# Patient Record
Sex: FEMALE | Race: BLACK OR AFRICAN AMERICAN | Employment: OTHER | URBAN - METROPOLITAN AREA
[De-identification: names, ages, dates, MRNs, and addresses within clinical notes are randomized per-mention and may not be internally consistent; named-entity substitution may affect disease eponyms.]

---

## 2017-01-04 ENCOUNTER — TELEPHONE (OUTPATIENT)
Dept: FAMILY MEDICINE CLINIC | Facility: CLINIC | Age: 67
End: 2017-01-04

## 2017-01-04 ENCOUNTER — OFFICE VISIT (OUTPATIENT)
Dept: FAMILY MEDICINE CLINIC | Facility: CLINIC | Age: 67
End: 2017-01-04

## 2017-01-04 VITALS
HEART RATE: 89 BPM | WEIGHT: 233 LBS | SYSTOLIC BLOOD PRESSURE: 143 MMHG | BODY MASS INDEX: 41 KG/M2 | TEMPERATURE: 98 F | RESPIRATION RATE: 18 BRPM | DIASTOLIC BLOOD PRESSURE: 81 MMHG

## 2017-01-04 DIAGNOSIS — E11.65 TYPE 2 DIABETES MELLITUS WITH HYPERGLYCEMIA, WITHOUT LONG-TERM CURRENT USE OF INSULIN (HCC): Primary | ICD-10-CM

## 2017-01-04 DIAGNOSIS — E78.5 HYPERLIPIDEMIA, UNSPECIFIED HYPERLIPIDEMIA TYPE: ICD-10-CM

## 2017-01-04 PROCEDURE — 99213 OFFICE O/P EST LOW 20 MIN: CPT | Performed by: FAMILY MEDICINE

## 2017-01-04 PROCEDURE — G0463 HOSPITAL OUTPT CLINIC VISIT: HCPCS | Performed by: FAMILY MEDICINE

## 2017-01-04 RX ORDER — BLOOD-GLUCOSE METER
1 KIT MISCELLANEOUS AS NEEDED
Qty: 1 KIT | Refills: 0 | Status: SHIPPED | OUTPATIENT
Start: 2017-01-04 | End: 2017-01-04

## 2017-01-04 RX ORDER — FLUTICASONE PROPIONATE AND SALMETEROL 250; 50 UG/1; UG/1
1 POWDER RESPIRATORY (INHALATION) EVERY 12 HOURS SCHEDULED
Qty: 3 PACKAGE | Refills: 2 | Status: SHIPPED | OUTPATIENT
Start: 2017-01-04 | End: 2017-04-05

## 2017-01-04 RX ORDER — LANCETS
EACH MISCELLANEOUS
Qty: 100 EACH | Refills: 11 | Status: SHIPPED | OUTPATIENT
Start: 2017-01-04 | End: 2020-07-30

## 2017-01-04 NOTE — TELEPHONE ENCOUNTER
Pt was prescribed FREESTYLE SYSTEM Does not apply Kit. Pharmacy stts that this does not exist anymore. Could dr please send script for One Touch Ultra machine? They will also need a separate script for the test strips and lancets.  Thank you

## 2017-01-15 NOTE — PROGRESS NOTES
HPI:  Thaddeus Opitz is a 78year old female who presents for lab results review. Pt recently had NP form insurance company come to house for evaluation. She was told her diabetes was uncontrolled.   Pt is currently not on medication and does not have interest i Rfl:    Multiple Vitamins-Minerals (CENTRUM SILVER) Oral Tab Take  by mouth. Take one tablet by mouth daily Disp:  Rfl:      No current facility-administered medications on file prior to visit.    Tobacco Use: no     ROS: see above    Objective:   Gen: AOx3

## 2017-02-17 RX ORDER — OMEPRAZOLE 20 MG/1
20 CAPSULE, DELAYED RELEASE ORAL
Qty: 90 CAPSULE | Refills: 1 | OUTPATIENT
Start: 2017-02-17

## 2017-02-17 NOTE — TELEPHONE ENCOUNTER
Diabetes Medications PROTOCOL FAILED.   Protocol Criteria:  · Appointment scheduled in the past 6 months or the next 3 months  · A1C < 7.5 in the past 6 months  · Creatinine in the past 12 months  · Creatinine result < 1.5   Recent Visits       Provider Dep

## 2017-02-17 NOTE — TELEPHONE ENCOUNTER
From: Santo Whyte  To:  Chaitanya Martinez MD  Sent: 2/15/2017 8:05 AM CST  Subject: Medication Renewal Request    Original authorizing provider: MD Santo Eugene would like a refill of the following medications:  omeprazole 20 MG

## 2017-02-20 ENCOUNTER — TELEPHONE (OUTPATIENT)
Dept: FAMILY MEDICINE CLINIC | Facility: CLINIC | Age: 67
End: 2017-02-20

## 2017-02-27 ENCOUNTER — TELEPHONE (OUTPATIENT)
Dept: FAMILY MEDICINE CLINIC | Facility: CLINIC | Age: 67
End: 2017-02-27

## 2017-02-27 NOTE — TELEPHONE ENCOUNTER
[facility] is requesting a new Rx for Contour meter, strips and lancets, insurance now pays for [employer] products per [facility].

## 2017-02-27 NOTE — TELEPHONE ENCOUNTER
Reviewed request with pharmacist, pharmacist states pt has not yet picked up monitor and supplies sent on 1/4/17. New prescriptions sent per protocol.

## 2017-03-17 ENCOUNTER — TELEPHONE (OUTPATIENT)
Dept: FAMILY MEDICINE CLINIC | Facility: CLINIC | Age: 67
End: 2017-03-17

## 2017-03-17 DIAGNOSIS — Z12.11 SCREENING FOR COLON CANCER: Primary | ICD-10-CM

## 2017-03-17 NOTE — TELEPHONE ENCOUNTER
Pt calling states she is unable to go to [facility] due to insurance, she will not be covered there, please disregard message below. Pt states she would like referral to GI dr here in [place]. Please call when referral for colonoscopy approved.

## 2017-03-17 NOTE — TELEPHONE ENCOUNTER
Patient has been scheduled for a colonoscopy at [facility] on Monday March 20. Office called to advise we must have a referral  Dr Shirley Zuñiga - Gastroenterologist  Diagnosis Code:  Z12.11  CPT: 84400    Fax to [phone] once approved.

## 2017-03-18 NOTE — TELEPHONE ENCOUNTER
Dr Radha Woodard, please sign pending referral for Dr Shelia Demarco pt (reordered from external referral to internal). Thank you.

## 2017-04-01 RX ORDER — OMEPRAZOLE 20 MG/1
20 CAPSULE, DELAYED RELEASE ORAL
Qty: 90 CAPSULE | Refills: 0 | Status: SHIPPED | OUTPATIENT
Start: 2017-04-01 | End: 2017-07-06

## 2017-04-01 NOTE — TELEPHONE ENCOUNTER
From: Christen Israel  To:  Joan Hansen MD  Sent: 3/21/2017 5:47 PM CDT  Subject: Medication Renewal Request    Original authorizing provider: MD Christen Hannah would like a refill of the following medications:  omeprazole 20 MG

## 2017-04-01 NOTE — TELEPHONE ENCOUNTER
Refill Protocol Appointment Criteria  · Appointment scheduled in the past 12 months or in the next 3 months  Recent Visits       Provider Department Primary Dx    2 months ago Polo Arroyo, [address] [facility], [place] [address], [facility] Type 2 diabetes kaela

## 2017-04-05 ENCOUNTER — OFFICE VISIT (OUTPATIENT)
Dept: FAMILY MEDICINE CLINIC | Facility: CLINIC | Age: 67
End: 2017-04-05

## 2017-04-05 VITALS
HEART RATE: 96 BPM | SYSTOLIC BLOOD PRESSURE: 137 MMHG | DIASTOLIC BLOOD PRESSURE: 74 MMHG | BODY MASS INDEX: 36.86 KG/M2 | HEIGHT: 63 IN | RESPIRATION RATE: 18 BRPM | WEIGHT: 208 LBS | TEMPERATURE: 98 F

## 2017-04-05 DIAGNOSIS — E11.65 TYPE 2 DIABETES MELLITUS WITH HYPERGLYCEMIA, WITHOUT LONG-TERM CURRENT USE OF INSULIN (HCC): ICD-10-CM

## 2017-04-05 DIAGNOSIS — J44.9 ASTHMA WITH COPD (CHRONIC OBSTRUCTIVE PULMONARY DISEASE) (HCC): Chronic | ICD-10-CM

## 2017-04-05 DIAGNOSIS — Z12.31 ENCOUNTER FOR SCREENING MAMMOGRAM FOR HIGH-RISK PATIENT: Primary | ICD-10-CM

## 2017-04-05 DIAGNOSIS — Z00.00 ROUTINE PHYSICAL EXAMINATION: ICD-10-CM

## 2017-04-05 DIAGNOSIS — G47.33 OBSTRUCTIVE SLEEP APNEA: ICD-10-CM

## 2017-04-05 DIAGNOSIS — R05.9 COUGH: ICD-10-CM

## 2017-04-05 DIAGNOSIS — D12.6 BENIGN NEOPLASM OF COLON, UNSPECIFIED PART OF COLON: ICD-10-CM

## 2017-04-05 DIAGNOSIS — M81.0 OSTEOPOROSIS WITHOUT CURRENT PATHOLOGICAL FRACTURE, UNSPECIFIED OSTEOPOROSIS TYPE: ICD-10-CM

## 2017-04-05 DIAGNOSIS — E78.5 HYPERLIPIDEMIA, UNSPECIFIED HYPERLIPIDEMIA TYPE: ICD-10-CM

## 2017-04-05 DIAGNOSIS — E66.01 SEVERE OBESITY (BMI 35.0-39.9) WITH COMORBIDITY (HCC): Chronic | ICD-10-CM

## 2017-04-05 PROBLEM — J44.89 ASTHMA WITH COPD (CHRONIC OBSTRUCTIVE PULMONARY DISEASE) (HCC): Chronic | Status: ACTIVE | Noted: 2017-04-05

## 2017-04-05 PROBLEM — J44.89 ASTHMA WITH COPD (CHRONIC OBSTRUCTIVE PULMONARY DISEASE): Chronic | Status: ACTIVE | Noted: 2017-04-05

## 2017-04-05 PROCEDURE — G0439 PPPS, SUBSEQ VISIT: HCPCS | Performed by: FAMILY MEDICINE

## 2017-04-05 PROCEDURE — 96160 PT-FOCUSED HLTH RISK ASSMT: CPT | Performed by: FAMILY MEDICINE

## 2017-04-05 RX ORDER — MONTELUKAST SODIUM 10 MG/1
TABLET ORAL
Qty: 90 TABLET | Refills: 1 | Status: SHIPPED | OUTPATIENT
Start: 2017-04-05 | End: 2017-07-31

## 2017-04-05 NOTE — PROGRESS NOTES
HPI:   Chris Murillo is a 78year old female who presents for a Medicare Annual Wellness visit.     Pt states she was told she had asthma in the past and then saw pulmonologist who told her she had bronchiolitis and should stop Advair and just take Alb been poor?: No    Type of Diet: Balanced    How does the patient maintain a good energy level?: Daily Walks    How would you describe your daily physical activity?: Moderate    How would you describe your current health state?: Fair    How do you maintain Medicare Assessment Questionnaire completed by patient and sent to HIM for scanning? Yes    Please go to \"Cognitive Assessment\" under Medicare Assessment section in Charting, test patient and document.     Then, refresh your progress note to see your input ULTRASOFT LANCETS Does not apply Misc Test by Intradermal route  every day Disp: 100 each Rfl: 11   Blood Glucose Monitoring Suppl ([address]) W/DEVICE Does not apply Kit To use daily Disp: 1 kit Rfl: 0   Glucose Blood (ONETOUCH ULTRA BLUE) In vaginal discharge or itching, no complaint of urinary incontinence   MUSCULOSKELETAL: denies back pain  NEURO: denies headaches  PSYCHE: denies depression or anxiety  HEMATOLOGIC: denies hx of anemia  ENDOCRINE: denies thyroid history  ALL/ASTHMA: denies h healthy eating and increased physical activity. Encounter for screening mammogram for high-risk patient  (primary encounter diagnosis)    Mammogram ordered. Will call with results.    Type 2 diabetes mellitus with hyperglycemia, without long-term current found for this or any previous visit. No flowsheet data found. Fecal Occult Blood Annually No results found for: FOB No flowsheet data found.     Glaucoma Screening      Ophthalmology Visit Annually yes    Bone Density Screening      Dexascan Every two (mg/dL)   Date Value   01/22/2014 21    No flowsheet data found. Drug Serum Conc  Annually No results found for: DIGOXIN, DIG, VALP No flowsheet data found.     Diabetes      HgbA1C  Annually HEMOGLOBIN A1C (% of total Hgb)   Date Value   01/22/2014 6.5 History  Administered            Date(s) Administered    HIGH DOSE FLU 65 YRS AND OLDER PRSV FREE SINGLE D (20656) FLU CLINIC                          10/26/2016      Pneumococcal (Prevnar 13)                          10/26/2016      Pneumococcal Vaccine,

## 2017-05-01 ENCOUNTER — HOSPITAL ENCOUNTER (OUTPATIENT)
Dept: BONE DENSITY | Facility: HOSPITAL | Age: 67
Discharge: HOME | End: 2017-05-01
Attending: FAMILY MEDICINE
Payer: MEDICARE

## 2017-05-01 ENCOUNTER — HOSPITAL ENCOUNTER (OUTPATIENT)
Dept: RESPIRATORY THERAPY | Facility: HOSPITAL | Age: 67
Discharge: HOME | End: 2017-05-01
Attending: FAMILY MEDICINE
Payer: MEDICARE

## 2017-05-01 DIAGNOSIS — J44.9 COPD (CHRONIC OBSTRUCTIVE PULMONARY DISEASE) (HCC): ICD-10-CM

## 2017-05-01 DIAGNOSIS — M81.0 OSTEOPOROSIS WITHOUT CURRENT PATHOLOGICAL FRACTURE, UNSPECIFIED OSTEOPOROSIS TYPE: ICD-10-CM

## 2017-05-01 DIAGNOSIS — J44.9 ASTHMA WITH COPD (CHRONIC OBSTRUCTIVE PULMONARY DISEASE) (HCC): Chronic | ICD-10-CM

## 2017-05-01 DIAGNOSIS — J45.909 ASTHMA: ICD-10-CM

## 2017-05-01 PROCEDURE — 94729 DIFFUSING CAPACITY: CPT | Performed by: INTERNAL MEDICINE

## 2017-05-01 PROCEDURE — 94726 PLETHYSMOGRAPHY LUNG VOLUMES: CPT | Performed by: INTERNAL MEDICINE

## 2017-05-01 PROCEDURE — 77080 DXA BONE DENSITY AXIAL: CPT

## 2017-05-01 PROCEDURE — 94060 EVALUATION OF WHEEZING: CPT | Performed by: INTERNAL MEDICINE

## 2017-05-02 NOTE — PROCEDURES
Pulmonary Function Test     Roni Citizen Patient Status:  Outpatient    1950 MRN [id number]   Date of Exam 2017 PCP Lara Storey MD           Spirometry   FEV1:  1.76 L /   76 %      FEV1/FVC:  73 %   No significant BDR     Lung Volume

## 2017-05-22 ENCOUNTER — APPOINTMENT (OUTPATIENT)
Dept: LAB | Age: 67
End: 2017-05-22
Attending: FAMILY MEDICINE
Payer: MEDICARE

## 2017-05-22 ENCOUNTER — OFFICE VISIT (OUTPATIENT)
Dept: FAMILY MEDICINE CLINIC | Facility: CLINIC | Age: 67
End: 2017-05-22

## 2017-05-22 VITALS
BODY MASS INDEX: 35 KG/M2 | RESPIRATION RATE: 20 BRPM | HEART RATE: 78 BPM | DIASTOLIC BLOOD PRESSURE: 79 MMHG | OXYGEN SATURATION: 93 % | TEMPERATURE: 98 F | SYSTOLIC BLOOD PRESSURE: 135 MMHG | WEIGHT: 196 LBS

## 2017-05-22 DIAGNOSIS — R06.2 WHEEZING: ICD-10-CM

## 2017-05-22 DIAGNOSIS — E11.65 TYPE 2 DIABETES MELLITUS WITH HYPERGLYCEMIA, WITHOUT LONG-TERM CURRENT USE OF INSULIN (HCC): ICD-10-CM

## 2017-05-22 DIAGNOSIS — E78.5 HYPERLIPIDEMIA, UNSPECIFIED HYPERLIPIDEMIA TYPE: ICD-10-CM

## 2017-05-22 DIAGNOSIS — R05.9 COUGH: Primary | ICD-10-CM

## 2017-05-22 PROCEDURE — 83036 HEMOGLOBIN GLYCOSYLATED A1C: CPT

## 2017-05-22 PROCEDURE — 80061 LIPID PANEL: CPT

## 2017-05-22 PROCEDURE — G0463 HOSPITAL OUTPT CLINIC VISIT: HCPCS | Performed by: FAMILY MEDICINE

## 2017-05-22 PROCEDURE — 99214 OFFICE O/P EST MOD 30 MIN: CPT | Performed by: FAMILY MEDICINE

## 2017-05-22 PROCEDURE — 94640 AIRWAY INHALATION TREATMENT: CPT | Performed by: FAMILY MEDICINE

## 2017-05-22 PROCEDURE — 36415 COLL VENOUS BLD VENIPUNCTURE: CPT

## 2017-05-22 RX ORDER — FLUTICASONE PROPIONATE AND SALMETEROL 250; 50 UG/1; UG/1
1 POWDER RESPIRATORY (INHALATION) EVERY 12 HOURS SCHEDULED
Qty: 1 EACH | Refills: 2 | Status: SHIPPED | OUTPATIENT
Start: 2017-05-22 | End: 2017-08-10 | Stop reason: ALTCHOICE

## 2017-05-22 RX ORDER — ALBUTEROL SULFATE 2.5 MG/3ML
2.5 SOLUTION RESPIRATORY (INHALATION) EVERY 4 HOURS PRN
Qty: 1 BOX | Refills: 3 | Status: SHIPPED | OUTPATIENT
Start: 2017-05-22

## 2017-05-22 RX ORDER — ALBUTEROL SULFATE 90 UG/1
2 AEROSOL, METERED RESPIRATORY (INHALATION) EVERY 4 HOURS PRN
Qty: 1 INHALER | Refills: 3 | Status: SHIPPED | OUTPATIENT
Start: 2017-05-22 | End: 2017-05-25

## 2017-05-22 RX ORDER — ALBUTEROL SULFATE 2.5 MG/3ML
2.5 SOLUTION RESPIRATORY (INHALATION) ONCE
Status: COMPLETED | OUTPATIENT
Start: 2017-05-22 | End: 2017-05-22

## 2017-05-22 RX ORDER — PREDNISONE 20 MG/1
20 TABLET ORAL 2 TIMES DAILY
Qty: 10 TABLET | Refills: 0 | Status: SHIPPED | OUTPATIENT
Start: 2017-05-22 | End: 2017-05-27

## 2017-05-22 RX ADMIN — ALBUTEROL SULFATE 2.5 MG: 2.5 SOLUTION RESPIRATORY (INHALATION) at 11:57:00

## 2017-05-22 NOTE — PROGRESS NOTES
HPI: Kaylen Tracey is a 78year old female who presents for cough and wheezing. Pt reports it started few weeks ago. Grandchild was sick. She had stopped Advair as well as we were unsure if she had asthma. Is waking up coughing and short of breath.    Had PFT (four) hours as needed for Wheezing. inhale 2 puff by inhalation route  every 4 - 6 hours as needed Disp: 1 Inhaler Rfl: 3   Cyanocobalamin (VITAMIN B-12 CR OR) Take  by mouth.  Disp:  Rfl:    Albuterol Sulfate (VENTOLIN) (2.5 MG/3ML) 0.083% Inhalation Nebu

## 2017-05-25 ENCOUNTER — TELEPHONE (OUTPATIENT)
Dept: FAMILY MEDICINE CLINIC | Facility: CLINIC | Age: 67
End: 2017-05-25

## 2017-05-25 DIAGNOSIS — Z12.31 ENCOUNTER FOR SCREENING MAMMOGRAM FOR HIGH-RISK PATIENT: Primary | ICD-10-CM

## 2017-05-25 RX ORDER — ALBUTEROL SULFATE 90 UG/1
2 AEROSOL, METERED RESPIRATORY (INHALATION) EVERY 4 HOURS PRN
Qty: 1 INHALER | Refills: 3 | Status: SHIPPED | OUTPATIENT
Start: 2017-05-25 | End: 2017-08-20

## 2017-05-25 NOTE — TELEPHONE ENCOUNTER
Pharmacy requesting ICD 10 code for Albuterol inhaler. Med pended with ICD 10 code J44.9, OK to send?

## 2017-05-25 NOTE — TELEPHONE ENCOUNTER
Need a new script to include the dx code  ICD 10  COPD J 44.9       Albuterol Sulfate HFA (PROAIR HFA) 108 (90 Base) MCG/ACT Inhalation Aero Soln 1 Inhaler 3 5/22/2017       [id number] - [place], [place] - [address] [phone], [phone]    MAXX

## 2017-05-25 NOTE — TELEPHONE ENCOUNTER
[facility] is calling to request pt screening mammogram order   Pt already had her test done, order is needed in order to release the results   Please fax to [phone]  Attn: [facility]

## 2017-05-26 NOTE — TELEPHONE ENCOUNTER
Faxed order again and did go through with a confirmation. [facility] should have order now.

## 2017-05-26 NOTE — TELEPHONE ENCOUNTER
Attempting to fax but doesn't seem to be going through. Called and [address] to ask if any other fax number to try. Will keep trying the [phone] fax#.

## 2017-05-27 ENCOUNTER — TELEPHONE (OUTPATIENT)
Dept: FAMILY MEDICINE CLINIC | Facility: CLINIC | Age: 67
End: 2017-05-27

## 2017-05-27 RX ORDER — ALBUTEROL SULFATE 90 UG/1
2 AEROSOL, METERED RESPIRATORY (INHALATION) EVERY 4 HOURS PRN
Qty: 1 INHALER | Refills: 3 | Status: CANCELLED | OUTPATIENT
Start: 2017-05-27

## 2017-05-27 NOTE — TELEPHONE ENCOUNTER
Nathen Hernandez- can you look to see what the results are so we can notify her. If they are normal, please call her. I don't want her to have to wait all weekend. Thanks!

## 2017-05-27 NOTE — TELEPHONE ENCOUNTER
From: Gabriele Pereyra  To:  Contreras Barrios MD  Sent: 5/21/2017 10:18 AM CDT  Subject: Medication Renewal Request    Original authorizing provider: Leola Phalen, MD Abram Stabler would like a refill of the following medications:  Albuterol Sulfa

## 2017-05-27 NOTE — TELEPHONE ENCOUNTER
Mammogram report received from [address] [place] [address]; placed on Dr Jama Thompson desk for review.

## 2017-06-15 ENCOUNTER — TELEPHONE (OUTPATIENT)
Dept: GASTROENTEROLOGY | Facility: CLINIC | Age: 67
End: 2017-06-15

## 2017-06-15 ENCOUNTER — OFFICE VISIT (OUTPATIENT)
Dept: GASTROENTEROLOGY | Facility: CLINIC | Age: 67
End: 2017-06-15

## 2017-06-15 ENCOUNTER — APPOINTMENT (OUTPATIENT)
Dept: LAB | Age: 67
End: 2017-06-15
Attending: INTERNAL MEDICINE
Payer: MEDICARE

## 2017-06-15 VITALS
HEART RATE: 76 BPM | BODY MASS INDEX: 34.55 KG/M2 | WEIGHT: 195 LBS | DIASTOLIC BLOOD PRESSURE: 70 MMHG | HEIGHT: 63 IN | SYSTOLIC BLOOD PRESSURE: 121 MMHG

## 2017-06-15 DIAGNOSIS — Z12.11 SCREEN FOR COLON CANCER: Primary | ICD-10-CM

## 2017-06-15 DIAGNOSIS — D12.6 BENIGN NEOPLASM OF COLON, UNSPECIFIED: ICD-10-CM

## 2017-06-15 DIAGNOSIS — D12.6 BENIGN NEOPLASM OF COLON, UNSPECIFIED PART OF COLON: ICD-10-CM

## 2017-06-15 DIAGNOSIS — Z12.11 COLON CANCER SCREENING: Primary | ICD-10-CM

## 2017-06-15 DIAGNOSIS — G47.33 OBSTRUCTIVE SLEEP APNEA: ICD-10-CM

## 2017-06-15 DIAGNOSIS — K21.9 GASTROESOPHAGEAL REFLUX DISEASE WITHOUT ESOPHAGITIS: ICD-10-CM

## 2017-06-15 PROCEDURE — G0463 HOSPITAL OUTPT CLINIC VISIT: HCPCS | Performed by: INTERNAL MEDICINE

## 2017-06-15 PROCEDURE — 36415 COLL VENOUS BLD VENIPUNCTURE: CPT

## 2017-06-15 PROCEDURE — 82607 VITAMIN B-12: CPT

## 2017-06-15 PROCEDURE — 99213 OFFICE O/P EST LOW 20 MIN: CPT | Performed by: INTERNAL MEDICINE

## 2017-06-15 PROCEDURE — 83735 ASSAY OF MAGNESIUM: CPT

## 2017-06-15 RX ORDER — MECLIZINE HYDROCHLORIDE 25 MG/1
12.5 TABLET ORAL
COMMUNITY
End: 2018-02-14

## 2017-06-15 RX ORDER — POLYETHYLENE GLYCOL 3350, SODIUM CHLORIDE, SODIUM BICARBONATE, POTASSIUM CHLORIDE 420; 11.2; 5.72; 1.48 G/4L; G/4L; G/4L; G/4L
POWDER, FOR SOLUTION ORAL
Qty: 1 BOTTLE | Refills: 0 | Status: SHIPPED | OUTPATIENT
Start: 2017-06-15 | End: 2018-02-14

## 2017-06-15 NOTE — TELEPHONE ENCOUNTER
Scheduled for:  Colonoscopy 29[address]  Provider Name: Monet Del Rio  Date:  Bunny Dobson 7/25/17  Location:  [facility]  Sedation:  MAC  Time: 1:30 pm  Prep: split dose trilyte  Meds/Allergies Reconciled?:  PCN  Diagnosis with codes:  Colon cancer screening Z12.11  Was patient infor

## 2017-06-15 NOTE — H&P
[address] [facility] [address] Gastroenterology                                                                                                  Clinic History and Physical     Pa Recon Soln As directed. Disp: 1 Bottle Rfl: 0   Albuterol Sulfate HFA (PROAIR HFA) 108 (90 Base) MCG/ACT Inhalation Aero Soln Inhale 2 puffs into the lungs every 4 (four) hours as needed for Wheezing.  inhale 2 puff by inhalation route  every 4 - 6 hours as ULTRA BLUE) In Vitro Strip place 1 by Misc. (Non-Drug; Combo Route) route 3 times every day with glucose monitor Disp:  Rfl:    Multiple Vitamins-Minerals (CENTRUM SILVER) Oral Tab Take  by mouth.  Take one tablet by mouth daily Disp:  Rfl:    Cobalamine Com colon cancer screening evaluation. NO anemia noted on lab work. GERD well controlled on PPI. Will check b12/mag. Discussed [place] screening but she is not interested in pursuing this.     #DM  #ANTONIO  #Hx of polyps  # Average Risk screening: patient is con

## 2017-06-15 NOTE — PATIENT INSTRUCTIONS
1. Schedule colonoscopy with MAC    2.  bowel prep from pharmacy ([facility])    3. Medication adjustment:       A. Day BEFORE colonoscopy: HOLD metformin     B. Day OF colonoscopy: HOLD metformin     C.  Continue ALL other medications as usual

## 2017-06-16 ENCOUNTER — TELEPHONE (OUTPATIENT)
Dept: GASTROENTEROLOGY | Facility: CLINIC | Age: 67
End: 2017-06-16

## 2017-06-16 DIAGNOSIS — R79.0 LOW MAGNESIUM LEVEL: Primary | ICD-10-CM

## 2017-06-16 NOTE — TELEPHONE ENCOUNTER
Slightly low mag (1.7), d/w patient and she will increase mag rich foods. Okay to continue PPI. Recheck Mag in August 2017. Also B12 high, I asked her to stop daily b12 and take only twice a week.

## 2017-07-07 RX ORDER — OMEPRAZOLE 20 MG/1
20 CAPSULE, DELAYED RELEASE ORAL
Qty: 90 CAPSULE | Refills: 0 | Status: SHIPPED
Start: 2017-07-07 | End: 2017-09-28

## 2017-07-07 NOTE — TELEPHONE ENCOUNTER
From: Tahira Richard  Sent: 7/6/2017 6:56 AM CDT  Subject: Medication Renewal Request    Tahira Richard would like a refill of the following medications:  omeprazole 20 MG Oral Capsule Delayed Release HERNÁN Lea    Preferred pharmacy: [facility]

## 2017-07-07 NOTE — TELEPHONE ENCOUNTER
Requesting Omeprazole refill    Refill Protocol Appointment Criteria  · Appointment scheduled in the past 12 months or in the next 3 months  Recent Outpatient Visits            3 weeks ago Screen for colon cancer    [facility], [facility], [facility] [address], [place]

## 2017-07-24 ENCOUNTER — TELEPHONE (OUTPATIENT)
Dept: GASTROENTEROLOGY | Facility: CLINIC | Age: 67
End: 2017-07-24

## 2017-07-24 NOTE — TELEPHONE ENCOUNTER
Pt indicates she took her iron pills and ate ribs yesterday. Pt indicates she will need to cancel CLN procedure for tomorrow 7/25, pls call to reschedule at; [phone],thanks.

## 2017-07-24 NOTE — TELEPHONE ENCOUNTER
Pt contacted and she states she wanted to cancel her CLN for tomorrow b/c she had ribs yesterday and has continued to take her MV with iron. I reviewed that eating ribs yesterday is not a reason to cancel her procedure.      She states she has not had anyth

## 2017-07-25 ENCOUNTER — ANESTHESIA EVENT (OUTPATIENT)
Dept: ENDOSCOPY | Facility: HOSPITAL | Age: 67
End: 2017-07-25
Payer: MEDICARE

## 2017-07-25 ENCOUNTER — HOSPITAL ENCOUNTER (OUTPATIENT)
Facility: HOSPITAL | Age: 67
Setting detail: HOSPITAL OUTPATIENT SURGERY
Discharge: HOME | End: 2017-07-25
Attending: INTERNAL MEDICINE | Admitting: INTERNAL MEDICINE
Payer: MEDICARE

## 2017-07-25 ENCOUNTER — SURGERY (OUTPATIENT)
Age: 67
End: 2017-07-25

## 2017-07-25 ENCOUNTER — ANESTHESIA (OUTPATIENT)
Dept: ENDOSCOPY | Facility: HOSPITAL | Age: 67
End: 2017-07-25
Payer: MEDICARE

## 2017-07-25 DIAGNOSIS — K63.5 COLON POLYP: Primary | ICD-10-CM

## 2017-07-25 DIAGNOSIS — K64.8 INTERNAL HEMORRHOIDS WITHOUT COMPLICATION: ICD-10-CM

## 2017-07-25 DIAGNOSIS — Z12.11 SPECIAL SCREENING FOR MALIGNANT NEOPLASM OF COLON: ICD-10-CM

## 2017-07-25 DIAGNOSIS — K62.1 RECTAL POLYP: ICD-10-CM

## 2017-07-25 LAB — GLUCOSE BLDC GLUCOMTR-MCNC: 87 MG/DL (ref 70–99)

## 2017-07-25 PROCEDURE — 0DBM8ZX EXCISION OF DESCENDING COLON, VIA NATURAL OR ARTIFICIAL OPENING ENDOSCOPIC, DIAGNOSTIC: ICD-10-PCS | Performed by: INTERNAL MEDICINE

## 2017-07-25 PROCEDURE — 45385 COLONOSCOPY W/LESION REMOVAL: CPT | Performed by: INTERNAL MEDICINE

## 2017-07-25 PROCEDURE — 0DBP8ZX EXCISION OF RECTUM, VIA NATURAL OR ARTIFICIAL OPENING ENDOSCOPIC, DIAGNOSTIC: ICD-10-PCS | Performed by: INTERNAL MEDICINE

## 2017-07-25 PROCEDURE — 0DBK8ZX EXCISION OF ASCENDING COLON, VIA NATURAL OR ARTIFICIAL OPENING ENDOSCOPIC, DIAGNOSTIC: ICD-10-PCS | Performed by: INTERNAL MEDICINE

## 2017-07-25 RX ORDER — SODIUM CHLORIDE, SODIUM LACTATE, POTASSIUM CHLORIDE, CALCIUM CHLORIDE 600; 310; 30; 20 MG/100ML; MG/100ML; MG/100ML; MG/100ML
INJECTION, SOLUTION INTRAVENOUS CONTINUOUS
Status: DISCONTINUED | OUTPATIENT
Start: 2017-07-25 | End: 2017-07-25

## 2017-07-25 RX ORDER — SODIUM CHLORIDE, SODIUM LACTATE, POTASSIUM CHLORIDE, CALCIUM CHLORIDE 600; 310; 30; 20 MG/100ML; MG/100ML; MG/100ML; MG/100ML
INJECTION, SOLUTION INTRAVENOUS CONTINUOUS
Status: CANCELLED | OUTPATIENT
Start: 2017-07-25

## 2017-07-25 RX ORDER — LIDOCAINE HYDROCHLORIDE 10 MG/ML
INJECTION, SOLUTION EPIDURAL; INFILTRATION; INTRACAUDAL; PERINEURAL AS NEEDED
Status: DISCONTINUED | OUTPATIENT
Start: 2017-07-25 | End: 2017-07-25 | Stop reason: SURG

## 2017-07-25 RX ORDER — SODIUM CHLORIDE, SODIUM LACTATE, POTASSIUM CHLORIDE, CALCIUM CHLORIDE 600; 310; 30; 20 MG/100ML; MG/100ML; MG/100ML; MG/100ML
INJECTION, SOLUTION INTRAVENOUS CONTINUOUS PRN
Status: DISCONTINUED | OUTPATIENT
Start: 2017-07-25 | End: 2017-07-25 | Stop reason: SURG

## 2017-07-25 RX ORDER — NALOXONE HYDROCHLORIDE 0.4 MG/ML
80 INJECTION, SOLUTION INTRAMUSCULAR; INTRAVENOUS; SUBCUTANEOUS AS NEEDED
Status: CANCELLED | OUTPATIENT
Start: 2017-07-25 | End: 2017-07-25

## 2017-07-25 RX ADMIN — SODIUM CHLORIDE, SODIUM LACTATE, POTASSIUM CHLORIDE, CALCIUM CHLORIDE: 600; 310; 30; 20 INJECTION, SOLUTION INTRAVENOUS at 14:00:00

## 2017-07-25 RX ADMIN — SODIUM CHLORIDE, SODIUM LACTATE, POTASSIUM CHLORIDE, CALCIUM CHLORIDE: 600; 310; 30; 20 INJECTION, SOLUTION INTRAVENOUS at 13:25:00

## 2017-07-25 RX ADMIN — LIDOCAINE HYDROCHLORIDE 25 MG: 10 INJECTION, SOLUTION EPIDURAL; INFILTRATION; INTRACAUDAL; PERINEURAL at 13:29:00

## 2017-07-25 NOTE — ANESTHESIA PREPROCEDURE EVALUATION
Anesthesia PreOp Note    HPI:     Sunny Farrell is a 78year old female who presents for preoperative consultation requested by: Giana Villareal MD    Date of Surgery: 7/25/2017    Procedure(s):  COLONOSCOPY  Indication: Special screening for malignant n capsule Rfl: 0    Meclizine HCl 25 MG Oral Tab Take 12.5 mg by mouth. Disp:  Rfl:  Taking   PEG 3350-KCl-Na Bicarb-NaCl (TRILYTE) 420 g Oral Recon Soln As directed.  Disp: 1 Bottle Rfl: 0    Albuterol Sulfate HFA (PROAIR HFA) 108 (90 Base) MCG/ACT Inhalatio mouth. Disp:  Rfl:  Taking   Glucose Blood (ONETOUCH ULTRA BLUE) In Vitro Strip place 1 by Misc. (Non-Drug; Combo Route) route 3 times every day with glucose monitor Disp:  Rfl:  Taking   Multiple Vitamins-Minerals (CENTRUM SILVER) Oral Tab Take  by mouth. dentures    Pulmonary - normal exam    breath sounds clear to auscultation  (+) asthma (Inhalers prn.  Last use last week), sleep apnea on CPAP,   Cardiovascular - negative ROS and normal exam    Rhythm: regular  Rate: normal  ROS comment: History of PE

## 2017-07-25 NOTE — ANESTHESIA POSTPROCEDURE EVALUATION
Patient: Lorena Ortiz    Procedure Summary     Date:  07/25/17 Room / Location:  [facility] ENDOSCOPY 01 / [facility] ENDOSCOPY    Anesthesia Start:  0962 Anesthesia Stop:  6632    Procedure:  COLONOSCOPY (N/A ) Diagnosis:       Special screening for malignant neoplas

## 2017-07-25 NOTE — OPERATIVE REPORT
COLONOSCOPY REPORT    Marilu Douglas     1950 Age 78year old   PCP Vahe Ibrahim DO Endoscopist Tony Pierson MD     Date of procedure: 17    Procedure: Colonoscopy w/cold snare polypectomy    Pre-operative diagnosis: Screening, hx Diverticulosis: none noted. 3. Terminal ileum: the visualized mucosa appeared normal.    4. A retroflexed view of the rectum revealed small internal hemorrhoids.     5. The colonic mucosa throughout the colon showed normal vascular pattern, without evide

## 2017-07-25 NOTE — H&P
History & Physical Examination    Patient Name: Rocio Sofia  MRN: [id number]  CSN: [id number]  YOB: 1950    Diagnosis: screening for colon cancer      Prescriptions Prior to Admission:  Esomeprazole Magnesium 20 MG Oral Capsule Delayed Rele Disp: 100 each Rfl: 3 Taking   ONETOUCH ULTRASOFT LANCETS Does not apply Misc Test by Intradermal route  every day Disp: 100 each Rfl: 11 Taking   Blood Glucose Monitoring Suppl (ONETOUCH ULTRA SYSTEM) W/DEVICE Does not apply Kit To use daily Disp: 1 kit R Shots of liquor per week         Comment: \"socially\"       SYSTEM Check if Physical Exam is Normal If not normal, please explain:   HEENT [ Yeimi Barnett  [ Silva Route [ Derinda Catching [ Shanice Fire [ Olinda Sin [ Cristo Carbo      I have discussed the risks a

## 2017-07-28 VITALS
RESPIRATION RATE: 18 BRPM | DIASTOLIC BLOOD PRESSURE: 76 MMHG | WEIGHT: 187 LBS | HEIGHT: 64 IN | OXYGEN SATURATION: 94 % | BODY MASS INDEX: 31.92 KG/M2 | HEART RATE: 69 BPM | SYSTOLIC BLOOD PRESSURE: 127 MMHG

## 2017-07-31 ENCOUNTER — TELEPHONE (OUTPATIENT)
Dept: GASTROENTEROLOGY | Facility: CLINIC | Age: 67
End: 2017-07-31

## 2017-07-31 NOTE — TELEPHONE ENCOUNTER
Recall colon in 3 years per.  Colon done 7/25/17  GI staff: please place recall for colonoscopy in 3 years I mailed out colonoscopy results letter to pt  [facility] maintennace    em

## 2017-08-05 ENCOUNTER — TELEPHONE (OUTPATIENT)
Dept: INTERNAL MEDICINE CLINIC | Facility: CLINIC | Age: 67
End: 2017-08-05

## 2017-08-05 RX ORDER — PREDNISONE 20 MG/1
20 TABLET ORAL DAILY
Qty: 5 TABLET | Refills: 0 | Status: SHIPPED | OUTPATIENT
Start: 2017-08-05 | End: 2017-08-10 | Stop reason: ALTCHOICE

## 2017-08-05 NOTE — TELEPHONE ENCOUNTER
Actions Requested: Pt with hx asthma requested prednisone for increased wheezing at night. Pt refusing UC. Problem: Pt with hx asthma, takes albuterol inhaler PRN. Pt has been waking up at night with increase wheezing/coughing.   Albuterol helps, but not train (i.e., within past 2 weeks; 6 or  more hours duration)  * Extra heart beats OR irregular heart beating   (i.e., \"palpitations\")  * Fever > 103 F (39.4 C)  * Fever > 100.5 F (38.1 C) and over 61years of age  * Fever > 100.5 F (38.1 C) and bedridden

## 2017-08-05 NOTE — TELEPHONE ENCOUNTER
Please inform patient prednisone Rx sent to pharmacy but must make appointment to follow-up within the next 5 days. Continue albuterol every 4 hours as needed, ER if increasing dyspnea, wheezing.

## 2017-08-05 NOTE — TELEPHONE ENCOUNTER
Please note. Thank you. Pt contacted and MD message below relayed to pt and informed of the Prednisone Rx sent to pharmacy and OV appt made for Thursday 8/10/17 in  2pm SDS slot.     Pt instructed to drink fluids, monitor Sx and go to the ER if Sx worsen

## 2017-08-07 RX ORDER — MONTELUKAST SODIUM 10 MG/1
TABLET ORAL
Qty: 90 TABLET | Refills: 0 | Status: SHIPPED | OUTPATIENT
Start: 2017-08-07 | End: 2017-11-03

## 2017-08-07 NOTE — TELEPHONE ENCOUNTER
From: Isis Ji  Sent: 7/31/2017 11:48 PM CDT  Subject: Medication Renewal Request    Isis Ji would like a refill of the following medications:  Montelukast Sodium 10 MG Oral Tab Bib Lala, ]    Preferred [id number] -

## 2017-08-07 NOTE — TELEPHONE ENCOUNTER
Refill Protocol Appointment Criteria  · Appointment scheduled in the past 12 months or in the next 3 months  Recent Outpatient Visits            1 month ago Screen for colon cancer    [address], [facility], [facility] [address] Derek Larson [address]

## 2017-08-10 ENCOUNTER — OFFICE VISIT (OUTPATIENT)
Dept: FAMILY MEDICINE CLINIC | Facility: CLINIC | Age: 67
End: 2017-08-10

## 2017-08-10 VITALS
SYSTOLIC BLOOD PRESSURE: 115 MMHG | HEIGHT: 63.5 IN | TEMPERATURE: 98 F | RESPIRATION RATE: 16 BRPM | BODY MASS INDEX: 32.9 KG/M2 | OXYGEN SATURATION: 96 % | HEART RATE: 73 BPM | WEIGHT: 188 LBS | DIASTOLIC BLOOD PRESSURE: 68 MMHG

## 2017-08-10 DIAGNOSIS — R06.2 WHEEZING: Primary | ICD-10-CM

## 2017-08-10 PROCEDURE — G0463 HOSPITAL OUTPT CLINIC VISIT: HCPCS | Performed by: FAMILY MEDICINE

## 2017-08-10 PROCEDURE — 99213 OFFICE O/P EST LOW 20 MIN: CPT | Performed by: FAMILY MEDICINE

## 2017-08-10 NOTE — PROGRESS NOTES
HPI: Vanessa Miller is a 75year old female who presents for wheezing which started 2 weeks ago. Pt states she tried taking apple cider vinegar but it did not help. Started using Albuterol. Called office and started Prednisone. Overall, improved.  Still has so daily Disp: 100 each Rfl: 3   Lancets Misc.  Does not apply Misc To check blood sugar once daily Disp: 100 each Rfl: 3   ONETOUCH ULTRASOFT LANCETS Does not apply Misc Test by Intradermal route  every day Disp: 100 each Rfl: 11   Blood Glucose Monitoring Noonan 188 lb (85.3 kg)   SpO2 96%   BMI 32.78 kg/m²   CV:  Regular rate and rhythm; no murmurs  Lungs:  Clear to ausculation; good aeration               No wheezes, rales or rhonchi      Assessment:/Plan:  Wheezing  (primary encounter diagnosis)    Improved sin

## 2017-08-22 RX ORDER — ALBUTEROL SULFATE 90 UG/1
2 AEROSOL, METERED RESPIRATORY (INHALATION) EVERY 4 HOURS PRN
Qty: 1 INHALER | Refills: 0 | Status: SHIPPED
Start: 2017-08-22 | End: 2018-01-15

## 2017-08-22 NOTE — TELEPHONE ENCOUNTER
Refill Protocol Appointment Criteria  · Appointment scheduled in the past 6 months or in the next 3 months  Recent Outpatient Visits            1 week ago Alysa Ramachandran , [place], [place]    Office Visit    2 months

## 2017-08-22 NOTE — TELEPHONE ENCOUNTER
From: Sunny Farrell  Sent: 8/20/2017 12:44 PM CDT  Subject: Medication Renewal Request    Sunny Farrell would like a refill of the following medications:  Albuterol Sulfate HFA (PROAIR HFA) 108 (90 Base) MCG/ACT Inhalation Aero Soln Van Johnson DO]

## 2017-09-08 ENCOUNTER — TELEPHONE (OUTPATIENT)
Dept: GASTROENTEROLOGY | Facility: CLINIC | Age: 67
End: 2017-09-08

## 2017-09-08 NOTE — TELEPHONE ENCOUNTER
Mailed letter to patient notifying him of overdue labs (Magnesium) Ordered 6-16-17 and due August 2017. Overdue letter mailed to patient.

## 2017-09-22 ENCOUNTER — APPOINTMENT (OUTPATIENT)
Dept: LAB | Age: 67
End: 2017-09-22
Attending: INTERNAL MEDICINE
Payer: MEDICARE

## 2017-09-22 DIAGNOSIS — R79.0 LOW MAGNESIUM LEVEL: ICD-10-CM

## 2017-09-22 LAB — MAGNESIUM SERPL-MCNC: 1.9 MG/DL (ref 1.8–2.5)

## 2017-09-22 PROCEDURE — 83735 ASSAY OF MAGNESIUM: CPT

## 2017-09-22 PROCEDURE — 36415 COLL VENOUS BLD VENIPUNCTURE: CPT

## 2017-09-29 RX ORDER — OMEPRAZOLE 20 MG/1
20 CAPSULE, DELAYED RELEASE ORAL
Qty: 90 CAPSULE | Refills: 0 | Status: SHIPPED | OUTPATIENT
Start: 2017-09-29 | End: 2018-01-01

## 2017-09-29 NOTE — TELEPHONE ENCOUNTER
Chart reviewed. Refills sent per Triage Dept protocol.      Refill Protocol Appointment Criteria  · Appointment scheduled in the past 12 months or in the next 3 months  Recent Outpatient Visits            1 month ago Frederic Rodriguez 2.

## 2017-09-29 NOTE — TELEPHONE ENCOUNTER
From: Karyna Mccabe  Sent: 9/28/2017 8:23 PM CDT  Subject: Medication Renewal Request    Karyna Mccabe would like a refill of the following medications:     omeprazole 20 MG Oral Capsule Delayed Release Valdene Bence, DO]    Preferred pharmacy: [facility]

## 2017-10-10 ENCOUNTER — OFFICE VISIT (OUTPATIENT)
Dept: FAMILY MEDICINE CLINIC | Facility: CLINIC | Age: 67
End: 2017-10-10

## 2017-10-10 VITALS
SYSTOLIC BLOOD PRESSURE: 119 MMHG | WEIGHT: 190 LBS | HEART RATE: 69 BPM | TEMPERATURE: 98 F | DIASTOLIC BLOOD PRESSURE: 72 MMHG | RESPIRATION RATE: 18 BRPM | BODY MASS INDEX: 33 KG/M2

## 2017-10-10 DIAGNOSIS — R10.2 PELVIC CRAMPING: ICD-10-CM

## 2017-10-10 DIAGNOSIS — E11.9 TYPE 2 DIABETES MELLITUS WITHOUT COMPLICATION, WITHOUT LONG-TERM CURRENT USE OF INSULIN (HCC): Primary | ICD-10-CM

## 2017-10-10 DIAGNOSIS — J45.30 MILD PERSISTENT ASTHMA WITHOUT COMPLICATION: ICD-10-CM

## 2017-10-10 PROCEDURE — 99214 OFFICE O/P EST MOD 30 MIN: CPT | Performed by: FAMILY MEDICINE

## 2017-10-10 PROCEDURE — G0463 HOSPITAL OUTPT CLINIC VISIT: HCPCS | Performed by: FAMILY MEDICINE

## 2017-10-10 RX ORDER — FLUTICASONE PROPIONATE AND SALMETEROL 250; 50 UG/1; UG/1
1 POWDER RESPIRATORY (INHALATION) EVERY 12 HOURS SCHEDULED
COMMUNITY
End: 2017-12-16

## 2017-10-10 NOTE — PROGRESS NOTES
HPI:  Mary Martin is a 75year old female who presents for diabetes follow-up. States sugars are 112-125. Last A1C was 6. Will make appt with ophtho. Trying to eat healthy. Exercises 3-4 days per week- about 30 min. Lost few pounds sine last visit.   Denies ch Multiple Vitamins-Minerals (CENTRUM SILVER) Oral Tab Take  by mouth. Take one tablet by mouth daily Disp:  Rfl:    Meclizine HCl 25 MG Oral Tab Take 12.5 mg by mouth. Disp:  Rfl:    PEG 3350-KCl-Na Bicarb-NaCl (TRILYTE) 420 g Oral Recon Soln As directed. edema    Assessment:/Plan:  Type 2 diabetes mellitus without complication, without long-term current use of insulin (hcc)  (primary encounter diagnosis)    Last A1C showed diabetes was very well controlled. Encouraged pt to continue with diet and exercise.

## 2017-10-28 ENCOUNTER — TELEPHONE (OUTPATIENT)
Dept: FAMILY MEDICINE CLINIC | Facility: CLINIC | Age: 67
End: 2017-10-28

## 2017-10-28 RX ORDER — PREDNISONE 20 MG/1
20 TABLET ORAL 2 TIMES DAILY
Qty: 10 TABLET | Refills: 0 | Status: SHIPPED | OUTPATIENT
Start: 2017-10-28 | End: 2017-11-02

## 2017-10-28 NOTE — TELEPHONE ENCOUNTER
Spoke with patient who developed productive cough, slight shortness of breath, and wheezing yesterday. Using nebulizer. Advised to use nebulizer every 4 hours. Start Prednisone. Make follow-up appt next week.

## 2017-11-01 ENCOUNTER — OFFICE VISIT (OUTPATIENT)
Dept: FAMILY MEDICINE CLINIC | Facility: CLINIC | Age: 67
End: 2017-11-01

## 2017-11-01 VITALS
HEART RATE: 64 BPM | OXYGEN SATURATION: 97 % | WEIGHT: 192 LBS | TEMPERATURE: 97 F | RESPIRATION RATE: 20 BRPM | SYSTOLIC BLOOD PRESSURE: 138 MMHG | DIASTOLIC BLOOD PRESSURE: 73 MMHG | BODY MASS INDEX: 33 KG/M2

## 2017-11-01 DIAGNOSIS — J45.21 MILD INTERMITTENT ASTHMA WITH EXACERBATION: Primary | ICD-10-CM

## 2017-11-01 PROCEDURE — 99213 OFFICE O/P EST LOW 20 MIN: CPT | Performed by: FAMILY MEDICINE

## 2017-11-01 PROCEDURE — G0463 HOSPITAL OUTPT CLINIC VISIT: HCPCS | Performed by: FAMILY MEDICINE

## 2017-11-01 PROCEDURE — G0008 ADMIN INFLUENZA VIRUS VAC: HCPCS | Performed by: FAMILY MEDICINE

## 2017-11-01 PROCEDURE — 90653 IIV ADJUVANT VACCINE IM: CPT | Performed by: FAMILY MEDICINE

## 2017-11-01 NOTE — PROGRESS NOTES
HPI: Lane Souza is a 75year old female who presents for asthma exacerbation. Started Prednisone and is on day 5. Using Albuterol every 4 hours but does not feel like she always needs it. Still coughing. No fever.  Denies runny nose, congestion, sinus pres Inhalation Nebu Soln Take 3 mL (2.5 mg total) by nebulization every 4 (four) hours as needed for Wheezing.  Use as directed Disp: 1 Box Rfl: 3   Blood Glucose Monitoring Suppl (CONNER CONTOUR MONITOR) Does not apply Device To check blood sugar once a day Dis No wheezes, rales or rhonchi      Assessment:/Plan:  Mild intermittent asthma with exacerbation  (primary encounter diagnosis)    Improved with Prednisone. Lungs clear. Use Albuterol as needed.     Mónica Hays, DO

## 2017-11-06 RX ORDER — MONTELUKAST SODIUM 10 MG/1
TABLET ORAL
Qty: 90 TABLET | Refills: 0 | Status: SHIPPED | OUTPATIENT
Start: 2017-11-06 | End: 2018-05-09

## 2017-11-06 NOTE — TELEPHONE ENCOUNTER
Signed Prescriptions Disp Refills    MONTELUKAST SODIUM 10 MG Oral Tab 90 tablet 0      Sig: TAKE ONE TABLET BY MOUTH EVERY NIGHT AT BEDTIME        Authorizing Provider: Porfirio Serrano        Ordering User: Elisa Allen           Refill appro

## 2017-11-06 NOTE — TELEPHONE ENCOUNTER
Refill Protocol Appointment Criteria  · Appointment scheduled in the past 6 months or in the next 3 months  Recent Outpatient Visits            5 days ago Mild intermittent asthma with exacerbation    [address] [place]

## 2017-12-16 RX ORDER — FLUTICASONE PROPIONATE AND SALMETEROL 50; 250 UG/1; UG/1
POWDER RESPIRATORY (INHALATION)
Qty: 1 PACKAGE | Refills: 1 | Status: SHIPPED | OUTPATIENT
Start: 2017-12-16 | End: 2018-02-06

## 2017-12-16 NOTE — TELEPHONE ENCOUNTER
From: Vinita Rubin  Sent: 12/15/2017 11:08 PM CST  Subject: Medication Renewal Request    Vinita Rubin would like a refill of the following medications:     Glucose Blood (CONNER CONTOUR TEST) In Vitro Strip Emelia Runner, DO]    Preferred pharmacy: [facility]

## 2017-12-16 NOTE — TELEPHONE ENCOUNTER
Signed Prescriptions Disp Refills    Glucose Blood In Vitro Strip 100 each 0      Sig: Check blood sugar once daily        Authorizing Provider: Juan Lopez        Ordering User: Denice Hall           Refill approved per protocol.       Diabetes

## 2017-12-21 ENCOUNTER — TELEPHONE (OUTPATIENT)
Dept: FAMILY MEDICINE CLINIC | Facility: CLINIC | Age: 67
End: 2017-12-21

## 2018-01-02 NOTE — TELEPHONE ENCOUNTER
From: Marly Moody  Sent: 1/1/2018 10:18 PM CST  Subject: Medication Renewal Request    Marly Moody would like a refill of the following medications:     omeprazole 20 MG Oral Capsule Delayed Release HERNÁN Oscar    Preferred pharmacy: [facility]

## 2018-01-03 RX ORDER — OMEPRAZOLE 20 MG/1
20 CAPSULE, DELAYED RELEASE ORAL
Qty: 90 CAPSULE | Refills: 0 | Status: SHIPPED
Start: 2018-01-03 | End: 2018-02-14

## 2018-01-03 NOTE — TELEPHONE ENCOUNTER
Refill Protocol Appointment Criteria  · Appointment scheduled in the past 12 months or in the next 3 months  Recent Outpatient Visits            2 months ago Mild intermittent asthma with exacerbation    [address] Lucas Cartagena

## 2018-01-10 ENCOUNTER — NURSE TRIAGE (OUTPATIENT)
Dept: OTHER | Age: 68
End: 2018-01-10

## 2018-01-10 ENCOUNTER — TELEPHONE (OUTPATIENT)
Dept: FAMILY MEDICINE CLINIC | Facility: CLINIC | Age: 68
End: 2018-01-10

## 2018-01-10 RX ORDER — PREDNISONE 20 MG/1
20 TABLET ORAL 2 TIMES DAILY
Qty: 10 TABLET | Refills: 0 | Status: SHIPPED | OUTPATIENT
Start: 2018-01-10 | End: 2018-01-15

## 2018-01-10 NOTE — TELEPHONE ENCOUNTER
Action Requested: Summary for Provider     []  Critical Lab, Recommendations Needed  [x] Need Additional Advice  []   FYI    []   Need Orders  [] Need Medications Sent to Pharmacy  []  Other     SUMMARY: pt scheduled appt 3:30 PM today, not sure she will b

## 2018-01-10 NOTE — TELEPHONE ENCOUNTER
Spoke with patient as she cancelled her scheduled appt due to no transportation. Informed patient Rx was sent by [facility] and to take as prescribed. To call us back if symptoms progress or worsen, or for any further questions or concerns.  Verbalized understandi

## 2018-01-10 NOTE — TELEPHONE ENCOUNTER
Pt called in requesting a steroid for the flu that she is experiencing. She states that she has been coughing/having symptoms for about a week and was hoping to get the medication.  CSS states the Dr. Tarun Gray may not prescribe the medication without an exam,

## 2018-01-15 RX ORDER — ALBUTEROL SULFATE 90 UG/1
2 AEROSOL, METERED RESPIRATORY (INHALATION) EVERY 4 HOURS PRN
Qty: 1 INHALER | Refills: 2 | Status: SHIPPED | OUTPATIENT
Start: 2018-01-15 | End: 2019-01-18

## 2018-01-15 NOTE — TELEPHONE ENCOUNTER
From: Sunny Farrell  Sent: 1/15/2018 12:53 PM CST  Subject: Medication Renewal Request    Sunny Farrell would like a refill of the following medications:     Albuterol Sulfate HFA (PROAIR HFA) 108 (90 Base) MCG/ACT Inhalation Aero VARSHA Cadet

## 2018-01-15 NOTE — TELEPHONE ENCOUNTER
Refill Protocol Appointment Criteria  · Appointment scheduled in the past 6 months or in the next 3 months  Recent Outpatient Visits            2 months ago Mild intermittent asthma with exacerbation    Keli Talamantes

## 2018-01-19 ENCOUNTER — TELEPHONE (OUTPATIENT)
Dept: INTERNAL MEDICINE CLINIC | Facility: CLINIC | Age: 68
End: 2018-01-19

## 2018-01-19 NOTE — TELEPHONE ENCOUNTER
Patient is eligible for a 2018 Annual Medicare Health Assessment. Discussed in detail w/patient. Appt scheduled for 2/14/18.

## 2018-01-31 RX ORDER — MONTELUKAST SODIUM 10 MG/1
TABLET ORAL
Qty: 90 TABLET | Refills: 0 | Status: SHIPPED | OUTPATIENT
Start: 2018-01-31 | End: 2018-02-14

## 2018-01-31 NOTE — TELEPHONE ENCOUNTER
Refill Protocol Appointment Criteria  · Appointment scheduled in the past 6 months or in the next 3 months  Recent Outpatient Visits            3 months ago Mild intermittent asthma with exacerbation    [address] Venus Champion

## 2018-02-04 NOTE — TELEPHONE ENCOUNTER
Refill Protocol Appointment Criteria  · Appointment scheduled in the past 12 months or in the next 3 months  Recent Outpatient Visits            3 months ago Mild intermittent asthma with exacerbation    Pamela Cardoza

## 2018-02-06 ENCOUNTER — TELEPHONE (OUTPATIENT)
Dept: FAMILY MEDICINE CLINIC | Facility: CLINIC | Age: 68
End: 2018-02-06

## 2018-02-06 RX ORDER — FLUTICASONE PROPIONATE AND SALMETEROL 50; 250 UG/1; UG/1
POWDER RESPIRATORY (INHALATION)
Qty: 1 EACH | Refills: 0 | Status: SHIPPED | OUTPATIENT
Start: 2018-02-06 | End: 2018-02-14

## 2018-02-06 NOTE — TELEPHONE ENCOUNTER
Refill Protocol Appointment Criteria  · Appointment scheduled in the past 6 months or in the next 3 months  Recent Outpatient Visits            3 months ago Mild intermittent asthma with exacerbation    [address]

## 2018-02-06 NOTE — TELEPHONE ENCOUNTER
[facility]ley [facility] calling states pt is medicare part D and pt will need all the DX and information on rx, also meter sent is no longer available please send rx for tomer contour next or next 1.          Current Outpatient Prescriptions:  Blood Glucose

## 2018-02-13 PROBLEM — E66.811 CLASS 1 OBESITY DUE TO EXCESS CALORIES WITH SERIOUS COMORBIDITY AND BODY MASS INDEX (BMI) OF 33.0 TO 33.9 IN ADULT: Status: ACTIVE | Noted: 2017-04-05

## 2018-02-13 PROBLEM — E66.09 CLASS 1 OBESITY DUE TO EXCESS CALORIES WITH SERIOUS COMORBIDITY AND BODY MASS INDEX (BMI) OF 33.0 TO 33.9 IN ADULT: Status: ACTIVE | Noted: 2017-04-05

## 2018-02-13 PROBLEM — Z86.711 HISTORY OF PULMONARY EMBOLISM: Status: ACTIVE | Noted: 2018-02-13

## 2018-02-14 ENCOUNTER — OFFICE VISIT (OUTPATIENT)
Dept: FAMILY MEDICINE CLINIC | Facility: CLINIC | Age: 68
End: 2018-02-14

## 2018-02-14 VITALS
TEMPERATURE: 98 F | HEIGHT: 63.5 IN | BODY MASS INDEX: 35.17 KG/M2 | RESPIRATION RATE: 18 BRPM | HEART RATE: 81 BPM | SYSTOLIC BLOOD PRESSURE: 117 MMHG | WEIGHT: 201 LBS | DIASTOLIC BLOOD PRESSURE: 73 MMHG

## 2018-02-14 DIAGNOSIS — J44.9 CHRONIC OBSTRUCTIVE PULMONARY DISEASE, UNSPECIFIED COPD TYPE (HCC): ICD-10-CM

## 2018-02-14 DIAGNOSIS — E66.01 SEVERE OBESITY (BMI 35.0-39.9) WITH COMORBIDITY (HCC): Chronic | ICD-10-CM

## 2018-02-14 DIAGNOSIS — Z86.711 HISTORY OF PULMONARY EMBOLISM: ICD-10-CM

## 2018-02-14 DIAGNOSIS — G47.33 OBSTRUCTIVE SLEEP APNEA: ICD-10-CM

## 2018-02-14 DIAGNOSIS — Z12.31 SCREENING MAMMOGRAM, ENCOUNTER FOR: ICD-10-CM

## 2018-02-14 DIAGNOSIS — J44.9 ASTHMA WITH COPD (CHRONIC OBSTRUCTIVE PULMONARY DISEASE) (HCC): Chronic | ICD-10-CM

## 2018-02-14 DIAGNOSIS — D12.6 BENIGN NEOPLASM OF COLON, UNSPECIFIED PART OF COLON: ICD-10-CM

## 2018-02-14 DIAGNOSIS — Z00.00 ROUTINE PHYSICAL EXAMINATION: Primary | ICD-10-CM

## 2018-02-14 DIAGNOSIS — E11.9 TYPE 2 DIABETES MELLITUS WITHOUT COMPLICATION, WITHOUT LONG-TERM CURRENT USE OF INSULIN (HCC): ICD-10-CM

## 2018-02-14 PROCEDURE — 96160 PT-FOCUSED HLTH RISK ASSMT: CPT | Performed by: FAMILY MEDICINE

## 2018-02-14 PROCEDURE — G0439 PPPS, SUBSEQ VISIT: HCPCS | Performed by: FAMILY MEDICINE

## 2018-02-14 RX ORDER — FLUTICASONE PROPIONATE AND SALMETEROL 250; 50 UG/1; UG/1
POWDER RESPIRATORY (INHALATION)
Qty: 3 EACH | Refills: 1 | Status: SHIPPED | OUTPATIENT
Start: 2018-02-14 | End: 2018-03-16

## 2018-02-14 RX ORDER — ATORVASTATIN CALCIUM 20 MG/1
20 TABLET, FILM COATED ORAL NIGHTLY
Qty: 90 TABLET | Refills: 1 | Status: SHIPPED | OUTPATIENT
Start: 2018-02-14 | End: 2018-04-23

## 2018-02-14 NOTE — PROGRESS NOTES
HPI:   Lorena Ortiz is a 75year old female who presents for a Medicare Annual Wellness visit. Overall, feeling well. She was sick in January. Moving to [place] in the Summer.  is retiring.      Pt reports she gets some cramping every month help    Toileting: Able without help    Dressing: Able without help    Eating: Able without help    Driving: Able without help    Preparing your meals: Able without help    Managing money/bills: Able without help    Taking medications as prescribed: Able w by mouth daily with breakfast. Disp: 90 tablet Rfl: 1   aspirin 81 MG Oral Tab Take 81 mg by mouth daily.  Disp:  Rfl:    Albuterol Sulfate HFA (PROAIR HFA) 108 (90 Base) MCG/ACT Inhalation Aero Soln Inhale 2 puffs into the lungs every 4 (four) hours as nee Diverticulosis    • Esophageal reflux    • Extrinsic asthma, unspecified    • History of blood transfusion    • Polyp of colon 07/2017    Multiple adenomas, repeat CLN in 7/2020   • Pulmonary embolism [facility])    • Sleep apnea       Past Surgical History:  No kg)  08/10/17 : 188 lb (85.3 kg)  07/25/17 : 187 lb (84.8 kg)  06/15/17 : 195 lb (88.5 kg)      > BP Readings from Last 3 Encounters:  02/14/18 : 117/73  11/01/17 : 138/73  10/10/17 : 119/72      GENERAL: well developed, well nourished, in no apparent dist pulmonary embolism  -No further episodes    Obstructive sleep apnea  -Stable    Asthma with COPD (chronic obstructive pulmonary disease) (HCC)  -Stable. Will see pulmonology    Severe obesity (BMI 35.0-39. 9) with comorbidity ([address]  -Encouraged weight loss Dexascan Every two years Last Dexa Scan:   XR DEXA BONE DENSITOMETRY (CPT=77080) 05/01/2017    No flowsheet data found. Pap and Pelvic      Pap  Annually if high risk There are no preventive care reminders to display for this patient.  Update [employer] flowsheet data found.     Diabetes      HgbA1C  Annually HEMOGLOBIN A1c (% of total Hgb)   Date Value   01/22/2014 6.5 (H)     GLYCOHEMOGLOBIN (HgA1c) (L) (%)   Date Value   04/06/2016 6.8 (H)     Glycohemoglobin (HgA1c) (%)   Date Value   05/22/2017 6.0 dose 65 yr and older (65049)                          11/01/2017      HIGH DOSE FLU 65 YRS AND OLDER PRSV FREE SINGLE D (34050) FLU CLINIC                          10/26/2016      Pneumococcal (Prevnar 13)                          10/26/2016      Pneumococ

## 2018-02-16 ENCOUNTER — TELEPHONE (OUTPATIENT)
Dept: OTHER | Age: 68
End: 2018-02-16

## 2018-02-16 RX ORDER — ROSUVASTATIN CALCIUM 20 MG/1
20 TABLET, COATED ORAL NIGHTLY
Qty: 30 TABLET | Refills: 0 | Status: SHIPPED | OUTPATIENT
Start: 2018-02-16 | End: 2018-11-27 | Stop reason: ALTCHOICE

## 2018-02-16 NOTE — TELEPHONE ENCOUNTER
Let's give her a week off from it and then we will change it to Rosuvastatin 20mg nightly. New script sent to pharmacy.

## 2018-02-16 NOTE — TELEPHONE ENCOUNTER
Pt asking for change in medication, states doctor prescribed Atorvastatin for her and this med is making her dizzy and nauseated. Pt states she started med Wednesday evening, symptoms started after taking first dose.  Ps took second one last night and los

## 2018-02-22 PROBLEM — J44.9 CHRONIC OBSTRUCTIVE PULMONARY DISEASE (HCC): Status: ACTIVE | Noted: 2018-02-22

## 2018-02-22 PROBLEM — E66.01 SEVERE OBESITY (BMI 35.0-39.9) WITH COMORBIDITY (HCC): Chronic | Status: ACTIVE | Noted: 2018-02-22

## 2018-02-22 PROBLEM — E11.9 TYPE 2 DIABETES MELLITUS WITHOUT COMPLICATION, WITHOUT LONG-TERM CURRENT USE OF INSULIN (HCC): Status: ACTIVE | Noted: 2018-02-22

## 2018-03-14 RX ORDER — FLUTICASONE PROPIONATE 50 MCG
2 SPRAY, SUSPENSION (ML) NASAL DAILY
Qty: 1 BOTTLE | Refills: 1
Start: 2018-03-14

## 2018-03-14 RX ORDER — FLUTICASONE PROPIONATE AND SALMETEROL 250; 50 UG/1; UG/1
POWDER RESPIRATORY (INHALATION)
Qty: 3 EACH | Refills: 1
Start: 2018-03-14

## 2018-03-15 ENCOUNTER — PATIENT MESSAGE (OUTPATIENT)
Dept: FAMILY MEDICINE CLINIC | Facility: CLINIC | Age: 68
End: 2018-03-15

## 2018-03-15 NOTE — TELEPHONE ENCOUNTER
From: Shady Scales  To: Beto Villeda DO  Sent: 3/15/2018 11:48 AM CDT  Subject: Prescription Question    I don't see this prescription on your list , I need a refill on Omeprazole 20 mg Thank you .

## 2018-03-16 RX ORDER — FLUTICASONE PROPIONATE 50 MCG
2 SPRAY, SUSPENSION (ML) NASAL DAILY
Qty: 1 BOTTLE | Refills: 1 | Status: SHIPPED | OUTPATIENT
Start: 2018-03-16 | End: 2018-03-19

## 2018-03-16 RX ORDER — OMEPRAZOLE 20 MG/1
20 CAPSULE, DELAYED RELEASE ORAL
Qty: 90 CAPSULE | Refills: 0 | Status: SHIPPED | OUTPATIENT
Start: 2018-03-16 | End: 2018-06-06

## 2018-03-16 RX ORDER — FLUTICASONE PROPIONATE AND SALMETEROL 250; 50 UG/1; UG/1
POWDER RESPIRATORY (INHALATION)
Qty: 3 EACH | Refills: 1 | Status: SHIPPED | OUTPATIENT
Start: 2018-03-16 | End: 2019-11-14

## 2018-03-16 NOTE — TELEPHONE ENCOUNTER
Please advise on refill request.     Refill Protocol Appointment Criteria  · Appointment scheduled in the past 12 months or in the next 3 months  Recent Outpatient Visits            1 month ago Routine physical examination    [facility] A[address],

## 2018-03-17 ENCOUNTER — TELEPHONE (OUTPATIENT)
Dept: FAMILY MEDICINE CLINIC | Facility: CLINIC | Age: 68
End: 2018-03-17

## 2018-03-17 NOTE — TELEPHONE ENCOUNTER
To Dr Maria G Mtz  Please review and advise the SIG for Flonase    Fluticasone Propionate (FLONASE) 50 MCG/ACT Nasal Suspension 1 Bottle 1 3/16/2018     Sig - Route: 2 sprays by Nasal route daily.  inhale 1 spray by Intranasal route  1 - 2 times every day in each no

## 2018-03-17 NOTE — TELEPHONE ENCOUNTER
Pharmacy called and needs clarification rx has two set of directions   Disp Refills Start End    Fluticasone Propionate (FLONASE) 50 MCG/ACT Nasal Suspension 1 Bottle 1 3/16/2018     Sig - Route: 2 sprays by Nasal route daily.  inhale 1 spray by Intranasal

## 2018-03-19 RX ORDER — FLUTICASONE PROPIONATE 50 MCG
2 SPRAY, SUSPENSION (ML) NASAL DAILY
Qty: 1 BOTTLE | Refills: 1 | Status: SHIPPED | OUTPATIENT
Start: 2018-03-19 | End: 2019-01-21

## 2018-04-23 ENCOUNTER — OFFICE VISIT (OUTPATIENT)
Dept: FAMILY MEDICINE CLINIC | Facility: CLINIC | Age: 68
End: 2018-04-23

## 2018-04-23 ENCOUNTER — APPOINTMENT (OUTPATIENT)
Dept: LAB | Age: 68
End: 2018-04-23
Attending: FAMILY MEDICINE
Payer: MEDICARE

## 2018-04-23 VITALS
TEMPERATURE: 98 F | HEART RATE: 81 BPM | SYSTOLIC BLOOD PRESSURE: 124 MMHG | RESPIRATION RATE: 16 BRPM | WEIGHT: 203 LBS | DIASTOLIC BLOOD PRESSURE: 72 MMHG | BODY MASS INDEX: 35 KG/M2

## 2018-04-23 DIAGNOSIS — E11.9 TYPE 2 DIABETES MELLITUS WITHOUT COMPLICATION, WITHOUT LONG-TERM CURRENT USE OF INSULIN (HCC): ICD-10-CM

## 2018-04-23 DIAGNOSIS — J44.9 CHRONIC OBSTRUCTIVE PULMONARY DISEASE, UNSPECIFIED COPD TYPE (HCC): ICD-10-CM

## 2018-04-23 DIAGNOSIS — E78.5 HYPERLIPIDEMIA, UNSPECIFIED HYPERLIPIDEMIA TYPE: ICD-10-CM

## 2018-04-23 DIAGNOSIS — K52.9 GASTROENTERITIS: ICD-10-CM

## 2018-04-23 DIAGNOSIS — E11.9 TYPE 2 DIABETES MELLITUS WITHOUT COMPLICATION, WITHOUT LONG-TERM CURRENT USE OF INSULIN (HCC): Primary | ICD-10-CM

## 2018-04-23 PROCEDURE — 84443 ASSAY THYROID STIM HORMONE: CPT

## 2018-04-23 PROCEDURE — 82570 ASSAY OF URINE CREATININE: CPT

## 2018-04-23 PROCEDURE — 36415 COLL VENOUS BLD VENIPUNCTURE: CPT

## 2018-04-23 PROCEDURE — 99214 OFFICE O/P EST MOD 30 MIN: CPT | Performed by: FAMILY MEDICINE

## 2018-04-23 PROCEDURE — 80061 LIPID PANEL: CPT

## 2018-04-23 PROCEDURE — 82043 UR ALBUMIN QUANTITATIVE: CPT

## 2018-04-23 PROCEDURE — 80053 COMPREHEN METABOLIC PANEL: CPT

## 2018-04-23 PROCEDURE — 83036 HEMOGLOBIN GLYCOSYLATED A1C: CPT

## 2018-04-23 RX ORDER — PRAVASTATIN SODIUM 10 MG
10 TABLET ORAL NIGHTLY
Qty: 30 TABLET | Refills: 1 | Status: SHIPPED | OUTPATIENT
Start: 2018-04-23 | End: 2018-06-11

## 2018-04-23 NOTE — PROGRESS NOTES
HPI: Ted Maldonado is a 75year old female who presents for follow-up. Move to [place] is on hold. Pt states she was sick over the weekend. She had nausea and vomiting. States her stomach is still bloated and gurgling. She is scared to eat anything.  Last vomited Rfl:    MetFORMIN HCl 500 MG Oral Tab Take 1 tablet (500 mg total) by mouth daily with breakfast. Disp: 90 tablet Rfl: 1   aspirin 81 MG Oral Tab Take 81 mg by mouth daily.  Disp:  Rfl:    Albuterol Sulfate HFA (PROAIR HFA) 108 (90 Base) MCG/ACT Inhalation ausculation; good aeration               No wheezes, rales or rhonchi  Abd: soft, non-tender, non-distended          Normal bowel sounds; no masses          No hepatosplenomegaly      Assessment:/Plan:  Type 2 diabetes mellitus without complication, withou

## 2018-04-27 ENCOUNTER — TELEPHONE (OUTPATIENT)
Dept: OTHER | Age: 68
End: 2018-04-27

## 2018-04-27 RX ORDER — PRAVASTATIN SODIUM 10 MG
5 TABLET ORAL NIGHTLY
Qty: 45 TABLET | Refills: 1 | Status: CANCELLED | OUTPATIENT
Start: 2018-04-27

## 2018-04-27 NOTE — TELEPHONE ENCOUNTER
Pt called in to request lowering her Pravastatin dose to 5mg daily. States that she has had 2 previous cholesterol meds that caused the same symptoms. She states that she feels \"out of it\", foggy, doesn't feel safe driving her car.  She states that she is

## 2018-05-12 NOTE — TELEPHONE ENCOUNTER
From: Isabel Hannon  Sent: 5/9/2018 11:02 AM CDT  Subject: Medication Renewal Request    Isabel Hannon would like a refill of the following medications:     MONTELUKAST SODIUM 10 MG Oral Tab Cherelle Carpio, DO]    Preferred pharmacy[facility] #[id number]

## 2018-05-14 RX ORDER — MONTELUKAST SODIUM 10 MG/1
10 TABLET ORAL NIGHTLY
Qty: 90 TABLET | Refills: 0
Start: 2018-05-14

## 2018-05-14 RX ORDER — MONTELUKAST SODIUM 10 MG/1
10 TABLET ORAL NIGHTLY
Qty: 90 TABLET | Refills: 0 | Status: SHIPPED | OUTPATIENT
Start: 2018-05-14 | End: 2019-01-18

## 2018-05-14 NOTE — TELEPHONE ENCOUNTER
From: Renea Rangel  Sent: 5/12/2018 10:08 PM CDT  Subject: Medication Renewal Request    Renea Rangel would like a refill of the following medications:     MONTELUKAST SODIUM 10 MG Oral Tab Julian Patient, DO]    Preferred pharmacy: [facility] #[id number] - [address] [facility], [address] [place] [address] Pipe [phone], [phone]

## 2018-05-14 NOTE — TELEPHONE ENCOUNTER
Refill Protocol Appointment Criteria  · Appointment scheduled in the past 6 months or in the next 3 months  Recent Outpatient Visits            3 weeks ago Type 2 diabetes mellitus without complication, without long-term current use [facility]    [place]

## 2018-06-04 ENCOUNTER — TELEPHONE (OUTPATIENT)
Dept: FAMILY MEDICINE CLINIC | Facility: CLINIC | Age: 68
End: 2018-06-04

## 2018-06-04 NOTE — TELEPHONE ENCOUNTER
Mammogram report received from [facility] ESCOB[facility] [address]; placed on Dr Shelia Demarco desk for review.

## 2018-06-06 RX ORDER — OMEPRAZOLE 20 MG/1
CAPSULE, DELAYED RELEASE ORAL
Qty: 90 CAPSULE | Refills: 0 | Status: SHIPPED | OUTPATIENT
Start: 2018-06-06 | End: 2018-08-26

## 2018-06-07 NOTE — TELEPHONE ENCOUNTER
Refill Protocol Appointment Criteria  · Appointment scheduled in the past 12 months or in the next 3 months  Recent Outpatient Visits            1 month ago Type 2 diabetes mellitus without complication, without long-term current use of insulin [facility]    El

## 2018-06-11 NOTE — TELEPHONE ENCOUNTER
From: Samule Cockayne  Sent: 6/11/2018 10:11 AM CDT  Subject: Medication Renewal Request    Samule Cockayne would like a refill of the following medications:     Pravastatin Sodium 10 MG Oral Tab Adan Quispe DO]    Preferred pharmacy: [address] [place] [address] #[id number] -

## 2018-06-12 RX ORDER — PRAVASTATIN SODIUM 10 MG
10 TABLET ORAL NIGHTLY
Qty: 30 TABLET | Refills: 2 | Status: SHIPPED
Start: 2018-06-12 | End: 2018-08-09

## 2018-06-12 NOTE — TELEPHONE ENCOUNTER
Please advise on refill request.     Cholesterol Medications  Protocol Criteria:  · Appointment scheduled in the past 12 months or in the next 3 months  · ALT & LDL on file in the past 12 months  · ALT result < 80  · LDL result <130   Recent Outpatient Vis

## 2018-07-13 NOTE — TELEPHONE ENCOUNTER
Refill Protocol Appointment Criteria  · Appointment scheduled in the past 12 months or in the next 3 months  Recent Outpatient Visits            2 months ago Type 2 diabetes mellitus without complication, without long-term current use of insulin ([facility] [address]    E

## 2018-07-13 NOTE — TELEPHONE ENCOUNTER
From: Aric Gallo  Sent: 7/12/2018 7:55 PM CDT  Subject: Medication Renewal Request    Aric Gallo would like a refill of the following medications:     Glucose Blood In Vitro Strip Kaylyn Timmons DO]    Preferred pharmacy: [facility] #[id number] - [place]

## 2018-07-27 NOTE — TELEPHONE ENCOUNTER
From: Karyna Mccabe  Sent: 7/27/2018 2:27 PM CDT  Subject: Medication Renewal Request    Karyna Mccabe would like a refill of the following medications:     Glucose Blood In Vitro Strip HERNÁN Jiang   Patient Comment: I have the one touch KIT

## 2018-07-28 NOTE — TELEPHONE ENCOUNTER
Diabetic supplies passed protocol. Filled per protocol.     Diabetes Medications  Protocol Criteria:  · Appointment scheduled in the past 6 months or the next 3 months  · A1C < 7.5 in the past 6 months  · Creatinine in the past 12 months  · Creatinine resul

## 2018-08-09 NOTE — TELEPHONE ENCOUNTER
Refill protocol criteria met, rx sent.       Refill Protocol Appointment Criteria  · Appointment scheduled in the past 6 months or in the next 3 months  Recent Outpatient Visits            3 months ago Type 2 diabetes mellitus without complication, without

## 2018-08-10 RX ORDER — PRAVASTATIN SODIUM 10 MG
10 TABLET ORAL NIGHTLY
Qty: 90 TABLET | Refills: 0 | Status: SHIPPED | OUTPATIENT
Start: 2018-08-10 | End: 2019-06-21

## 2018-08-10 RX ORDER — MONTELUKAST SODIUM 10 MG/1
TABLET ORAL
Qty: 90 TABLET | Refills: 0 | Status: SHIPPED | OUTPATIENT
Start: 2018-08-10 | End: 2018-10-30

## 2018-08-10 NOTE — TELEPHONE ENCOUNTER
From: Karyna Mccabe  Sent: 8/9/2018 9:20 PM CDT  Subject: Medication Renewal Request    Karyna Mccabe would like a refill of the following medications:     Pravastatin Sodium 10 MG Oral Tab Miryam Gil DO]    Preferred pharmacy: [address] [place] [address] #[id number] - [place]

## 2018-08-10 NOTE — TELEPHONE ENCOUNTER
Refill Protocol Appointment Criteria  · Appointment scheduled in the past 6 months or in the next 3 months  Recent Outpatient Visits            3 months ago Type 2 diabetes mellitus without complication, without long-term current use of insulin ([facility]    El

## 2018-08-10 NOTE — TELEPHONE ENCOUNTER
Cholesterol Medications  Protocol Criteria:  · Appointment scheduled in the past 12 months or in the next 3 months  · ALT & LDL on file in the past 12 months  · ALT result < 80  · LDL result <130   Recent Outpatient Visits            3 months ago Type 2 di

## 2018-08-27 NOTE — TELEPHONE ENCOUNTER
From: Kulwinder Porter  Sent: 8/26/2018 12:11 PM CDT  Subject: Medication Renewal Request    Kulwinder Porter would like a refill of the following medications:     OMEPRAZOLE 20 MG Oral Capsule Delayed Release HERNÁN Araya    Preferred pharmacy: [facility]

## 2018-08-28 RX ORDER — OMEPRAZOLE 20 MG/1
20 CAPSULE, DELAYED RELEASE ORAL EVERY MORNING
Qty: 90 CAPSULE | Refills: 0 | Status: SHIPPED | OUTPATIENT
Start: 2018-08-28 | End: 2018-11-27

## 2018-08-28 NOTE — TELEPHONE ENCOUNTER
Please advise on refill request.     Refill Protocol Appointment Criteria  · Appointment scheduled in the past 12 months or in the next 3 months  Recent Outpatient Visits            4 months ago Type 2 diabetes mellitus without complication, without long-t

## 2018-09-10 RX ORDER — FLUTICASONE PROPIONATE AND SALMETEROL 250; 50 UG/1; UG/1
POWDER RESPIRATORY (INHALATION)
Qty: 60 EACH | Refills: 0 | Status: SHIPPED | OUTPATIENT
Start: 2018-09-10 | End: 2018-11-27

## 2018-09-11 NOTE — TELEPHONE ENCOUNTER
Refill Protocol Appointment Criteria  · Appointment scheduled in the past 6 months or in the next 3 months  Recent Outpatient Visits            4 months ago Type 2 diabetes mellitus without complication, without long-term current use of insulin ([facility]    El

## 2018-10-10 ENCOUNTER — OFFICE VISIT (OUTPATIENT)
Dept: FAMILY MEDICINE CLINIC | Facility: CLINIC | Age: 68
End: 2018-10-10

## 2018-10-10 VITALS
BODY MASS INDEX: 37 KG/M2 | DIASTOLIC BLOOD PRESSURE: 77 MMHG | SYSTOLIC BLOOD PRESSURE: 145 MMHG | WEIGHT: 211 LBS | TEMPERATURE: 98 F | RESPIRATION RATE: 18 BRPM | HEART RATE: 76 BPM

## 2018-10-10 DIAGNOSIS — E11.9 TYPE 2 DIABETES MELLITUS WITHOUT COMPLICATION, WITHOUT LONG-TERM CURRENT USE OF INSULIN (HCC): Primary | ICD-10-CM

## 2018-10-10 DIAGNOSIS — J44.9 CHRONIC OBSTRUCTIVE PULMONARY DISEASE, UNSPECIFIED COPD TYPE (HCC): ICD-10-CM

## 2018-10-10 PROCEDURE — 99214 OFFICE O/P EST MOD 30 MIN: CPT | Performed by: FAMILY MEDICINE

## 2018-10-10 PROCEDURE — G0009 ADMIN PNEUMOCOCCAL VACCINE: HCPCS | Performed by: FAMILY MEDICINE

## 2018-10-10 PROCEDURE — G0463 HOSPITAL OUTPT CLINIC VISIT: HCPCS | Performed by: FAMILY MEDICINE

## 2018-10-10 PROCEDURE — 90653 IIV ADJUVANT VACCINE IM: CPT | Performed by: FAMILY MEDICINE

## 2018-10-10 PROCEDURE — 90732 PPSV23 VACC 2 YRS+ SUBQ/IM: CPT | Performed by: FAMILY MEDICINE

## 2018-10-10 PROCEDURE — G0008 ADMIN INFLUENZA VIRUS VAC: HCPCS | Performed by: FAMILY MEDICINE

## 2018-10-10 NOTE — PROGRESS NOTES
HPI: Ted Maldonado is a 70year old female who presents for diabetes follow-up. Needs flu shot. Pt reports she had bloodwork and urine test done through [facility]. Results not received. Normal urine albumin. Last hemoglobin A1C 6.0.   Pt reports her diabetes has been each Rfl: 1   Rosuvastatin Calcium 20 MG Oral Tab Take 1 tablet (20 mg total) by mouth nightly. Disp: 30 tablet Rfl: 0   Esomeprazole Magnesium (NEXIUM OR) Take by mouth. Disp:  Rfl:    aspirin 81 MG Oral Tab Take 81 mg by mouth daily.  Disp:  Rfl:    Albut Paperwork signed and address corrected to have labs forwarded to office. Encouraged she focus on healthy diet and weight loss. Flu shot given as well as PCV 25. Will have eye exam in December.    Chronic obstructive pulmonary disease, unspecified copd type

## 2018-10-31 RX ORDER — FLUTICASONE PROPIONATE AND SALMETEROL 250; 50 UG/1; UG/1
POWDER RESPIRATORY (INHALATION)
Qty: 60 EACH | Refills: 2 | Status: SHIPPED | OUTPATIENT
Start: 2018-10-31 | End: 2019-02-13

## 2018-10-31 RX ORDER — MONTELUKAST SODIUM 10 MG/1
TABLET ORAL
Qty: 90 TABLET | Refills: 0 | Status: SHIPPED | OUTPATIENT
Start: 2018-10-31 | End: 2018-11-27

## 2018-11-01 NOTE — TELEPHONE ENCOUNTER
Refill passed per [facility] protocol.     Refill Protocol Appointment Criteria  · Appointment scheduled in the past 6 months or in the next 3 months  Recent Outpatient Visits            3 weeks ago Type 2 diabetes mellitus without complication, withou

## 2018-11-01 NOTE — TELEPHONE ENCOUNTER
Refill passed per [facility], [facility] protocol.     Refill Protocol Appointment Criteria  · Appointment scheduled in the past 6 months or in the next 3 months  Recent Outpatient Visits            3 weeks ago Type 2 diabetes mellitus without complication, withou

## 2018-11-05 NOTE — TELEPHONE ENCOUNTER
Please review; protocol failed.     Diabetes Medications  Protocol Criteria:  · Appointment scheduled in the past 6 months or the next 3 months  · A1C < 7.5 in the past 6 months  · Creatinine in the past 12 months  · Creatinine result < 1.5   Recent Outpati

## 2018-11-26 ENCOUNTER — NURSE TRIAGE (OUTPATIENT)
Dept: OTHER | Age: 68
End: 2018-11-26

## 2018-11-26 NOTE — TELEPHONE ENCOUNTER
Action Requested: Summary for Provider     []  Critical Lab, Recommendations Needed  [] Need Additional Advice  [x]   FYI    []   Need Orders  [] Need Medications Sent to Pharmacy  []  Other     SUMMARY: Pt contacts clinic stating she stubbed her toe at [facility]

## 2018-11-26 NOTE — TELEPHONE ENCOUNTER
Reason for Disposition  • Pus or cloudy fluid draining from wound    Protocols used: WOUND INFECTION-A-OH

## 2018-11-27 ENCOUNTER — OFFICE VISIT (OUTPATIENT)
Dept: FAMILY MEDICINE CLINIC | Facility: CLINIC | Age: 68
End: 2018-11-27

## 2018-11-27 VITALS
BODY MASS INDEX: 37.07 KG/M2 | HEART RATE: 85 BPM | SYSTOLIC BLOOD PRESSURE: 143 MMHG | TEMPERATURE: 99 F | HEIGHT: 63.5 IN | WEIGHT: 211.81 LBS | DIASTOLIC BLOOD PRESSURE: 84 MMHG | RESPIRATION RATE: 18 BRPM

## 2018-11-27 DIAGNOSIS — E11.9 TYPE 2 DIABETES MELLITUS WITHOUT COMPLICATION, WITHOUT LONG-TERM CURRENT USE OF INSULIN (HCC): ICD-10-CM

## 2018-11-27 DIAGNOSIS — B35.1 ONYCHOMYCOSIS OF LEFT GREAT TOE: ICD-10-CM

## 2018-11-27 DIAGNOSIS — L03.116 CELLULITIS OF LEFT FOOT: Primary | ICD-10-CM

## 2018-11-27 PROCEDURE — 99214 OFFICE O/P EST MOD 30 MIN: CPT | Performed by: FAMILY MEDICINE

## 2018-11-27 RX ORDER — CLINDAMYCIN HYDROCHLORIDE 300 MG/1
300 CAPSULE ORAL 3 TIMES DAILY
Qty: 21 CAPSULE | Refills: 0 | Status: SHIPPED | OUTPATIENT
Start: 2018-11-27 | End: 2018-12-04

## 2018-11-27 NOTE — PROGRESS NOTES
HPI:    Patient ID: Adry Pisano is a 70year old female. Toe Pain   This is a new problem. The current episode started in the past 7 days. The problem has been gradually worsening. Pertinent negatives include no diaphoresis, fever, nausea or rash.  Babak Gusman [place]) Take  by mouth. Disp:  Rfl:    Multiple Vitamins-Minerals (CENTRUM SILVER) Oral Tab Take  by mouth.  Take one tablet by mouth daily Disp:  Rfl:    fluticasone-salmeterol (ADVAIR DISKUS) 250-50 MCG/DOSE Inhalation Aerosol Powder, Breath Activated inhale worsening. Onychomycosis of toenail–as  above. Type 2 diabetes– last hemoglobin A1c 6.0. No orders of the defined types were placed in this encounter.       Meds This Visit:  Requested Prescriptions     Signed Prescriptions Disp Refills   • [place]

## 2018-11-28 RX ORDER — OMEPRAZOLE 20 MG/1
20 CAPSULE, DELAYED RELEASE ORAL EVERY MORNING
Qty: 90 CAPSULE | Refills: 0 | Status: SHIPPED | OUTPATIENT
Start: 2018-11-28 | End: 2019-02-25

## 2018-11-28 NOTE — TELEPHONE ENCOUNTER
Refill Protocol Appointment Criteria  · Appointment scheduled in the past 12 months or in the next 3 months  Recent Outpatient Visits            Today Cellulitis of left foot    [facility], [facility], [address], [facility] Ellie Botello MD    Office Visit

## 2018-12-13 ENCOUNTER — OFFICE VISIT (OUTPATIENT)
Dept: OPHTHALMOLOGY | Facility: CLINIC | Age: 68
End: 2018-12-13

## 2018-12-13 DIAGNOSIS — H43.393 FLOATERS IN VISUAL FIELD, BILATERAL: ICD-10-CM

## 2018-12-13 DIAGNOSIS — H25.13 AGE-RELATED NUCLEAR CATARACT OF BOTH EYES: ICD-10-CM

## 2018-12-13 DIAGNOSIS — E11.9 TYPE 2 DIABETES MELLITUS WITHOUT COMPLICATION, WITHOUT LONG-TERM CURRENT USE OF INSULIN (HCC): Primary | ICD-10-CM

## 2018-12-13 PROCEDURE — 92004 COMPRE OPH EXAM NEW PT 1/>: CPT | Performed by: OPHTHALMOLOGY

## 2018-12-13 NOTE — PATIENT INSTRUCTIONS
Type 2 diabetes mellitus without complication, without long-term current use of insulin (HCC)  Diabetes type II: no background of retinopathy, no signs of neovascularization noted. Discussed ocular and systemic benefits of blood sugar control.   Diagnosis

## 2018-12-13 NOTE — PROGRESS NOTES
Marilu Douglas is a 70year old female.     HPI:     HPI     Consult      Additional comments: Consult per Dr. Smith Manual              Diabetic Eye Exam      Additional comments: Pt has been a diabetic for 2 years  2 years on pills/ 0 years on Insulin   Pt [facility] capsule (20 mg total) by mouth every morning.  Disp: 90 capsule Rfl: 0   MetFORMIN HCl 500 MG Oral Tab TAKE ONE TABLET BY MOUTH ONE TIME DAILY with breakfast Disp: 90 tablet Rfl: 0   fluticasone-salmeterol (ADVAIR DISKUS) 250-50 MCG/DOSE Inhalation Aerosol Glucose Blood (ONETOUCH ULTRA BLUE) In Vitro Strip place 1 by Misc. (Non-Drug; Combo Route) route 3 times every day with glucose monitor Disp:  Rfl:    Multiple Vitamins-Minerals (CENTRUM SILVER) Oral Tab Take  by mouth.  Take one tablet by mouth daily Dis neovascularization noted. Discussed ocular and systemic benefits of blood sugar control. Diagnosis and treatment discussed in detail with patient. Age-related nuclear cataract of both eyes  Discussed early cataracts with patient.  Told patient that cat

## 2019-01-19 RX ORDER — MONTELUKAST SODIUM 10 MG/1
10 TABLET ORAL NIGHTLY
Qty: 90 TABLET | Refills: 0 | Status: SHIPPED | OUTPATIENT
Start: 2019-01-19 | End: 2019-05-05

## 2019-01-19 RX ORDER — ALBUTEROL SULFATE 90 UG/1
2 AEROSOL, METERED RESPIRATORY (INHALATION) EVERY 4 HOURS PRN
Qty: 1 INHALER | Refills: 2 | Status: SHIPPED | OUTPATIENT
Start: 2019-01-19 | End: 2019-10-14

## 2019-01-21 RX ORDER — FLUTICASONE PROPIONATE 50 MCG
2 SPRAY, SUSPENSION (ML) NASAL DAILY
Qty: 1 BOTTLE | Refills: 2 | Status: SHIPPED | OUTPATIENT
Start: 2019-01-21 | End: 2020-02-26

## 2019-02-13 RX ORDER — FLUTICASONE PROPIONATE AND SALMETEROL 250; 50 UG/1; UG/1
POWDER RESPIRATORY (INHALATION)
Qty: 3 EACH | Refills: 0 | Status: SHIPPED | OUTPATIENT
Start: 2019-02-13 | End: 2019-05-16

## 2019-02-14 ENCOUNTER — TELEPHONE (OUTPATIENT)
Dept: FAMILY MEDICINE CLINIC | Facility: CLINIC | Age: 69
End: 2019-02-14

## 2019-02-14 NOTE — TELEPHONE ENCOUNTER
[place] [facility] Albuterol sul hfa 90mcg inh is not covered or is subject to the limits of utililzation management.

## 2019-02-14 NOTE — TELEPHONE ENCOUNTER
Spoke with [facility] pharmacist Craig Abernathy regarding the Albuterol Sulfate inhaler and no PA is required. Medication was paid for by insurance and picked up by patient.

## 2019-02-27 RX ORDER — OMEPRAZOLE 20 MG/1
20 CAPSULE, DELAYED RELEASE ORAL EVERY MORNING
Qty: 90 CAPSULE | Refills: 0 | Status: SHIPPED | OUTPATIENT
Start: 2019-02-27 | End: 2019-10-31

## 2019-02-27 RX ORDER — OMEPRAZOLE 20 MG/1
20 CAPSULE, DELAYED RELEASE ORAL EVERY MORNING
Qty: 90 CAPSULE | Refills: 0 | Status: SHIPPED | OUTPATIENT
Start: 2019-02-27 | End: 2019-05-30

## 2019-02-27 NOTE — TELEPHONE ENCOUNTER
Review pended refill request as it does not fall under a protocol.     Last Rx: 11-28-18  LOV: 11-27-18

## 2019-03-21 ENCOUNTER — TELEPHONE (OUTPATIENT)
Dept: OTHER | Age: 69
End: 2019-03-21

## 2019-03-21 NOTE — TELEPHONE ENCOUNTER
Pt wanted expedited appt for ED F/U no access w/ PCP as out of office. Offered another provider and declined.   Will keep appt as scheduled

## 2019-04-11 ENCOUNTER — OFFICE VISIT (OUTPATIENT)
Dept: FAMILY MEDICINE CLINIC | Facility: CLINIC | Age: 69
End: 2019-04-11
Payer: MEDICARE

## 2019-04-11 VITALS
BODY MASS INDEX: 38 KG/M2 | TEMPERATURE: 98 F | RESPIRATION RATE: 16 BRPM | HEART RATE: 90 BPM | WEIGHT: 217 LBS | SYSTOLIC BLOOD PRESSURE: 130 MMHG | DIASTOLIC BLOOD PRESSURE: 72 MMHG

## 2019-04-11 DIAGNOSIS — J01.10 ACUTE NON-RECURRENT FRONTAL SINUSITIS: ICD-10-CM

## 2019-04-11 DIAGNOSIS — R42 VERTIGO: Primary | ICD-10-CM

## 2019-04-11 DIAGNOSIS — E78.5 HYPERLIPIDEMIA, UNSPECIFIED HYPERLIPIDEMIA TYPE: ICD-10-CM

## 2019-04-11 DIAGNOSIS — K92.1 BLOOD IN STOOL: ICD-10-CM

## 2019-04-11 PROCEDURE — G0463 HOSPITAL OUTPT CLINIC VISIT: HCPCS | Performed by: FAMILY MEDICINE

## 2019-04-11 PROCEDURE — 99214 OFFICE O/P EST MOD 30 MIN: CPT | Performed by: FAMILY MEDICINE

## 2019-04-11 RX ORDER — DOCUSATE SODIUM 100 MG/1
100 CAPSULE, LIQUID FILLED ORAL
COMMUNITY
Start: 2019-03-21 | End: 2019-07-08

## 2019-04-11 RX ORDER — AZITHROMYCIN 250 MG/1
TABLET, FILM COATED ORAL
Qty: 6 TABLET | Refills: 0 | Status: SHIPPED | OUTPATIENT
Start: 2019-04-11 | End: 2019-07-08

## 2019-04-11 NOTE — PROGRESS NOTES
HPI: Beny Steve is a 70year old female who presents for ER follow-up. Went to ER recently for blood in the stool. Had anal fissure. She was put on a stool softener and it resolved. Pt reports vertigo started to act up.  Pt reports he has nasal congestio Tab Take 1 tablet (10 mg total) by mouth nightly.  Disp: 90 tablet Rfl: 0   Glucose Blood In Vitro Strip Check glucose once daily Disp: 100 strip Rfl: 2   fluticasone-salmeterol (ADVAIR DISKUS) 250-50 MCG/DOSE Inhalation Aerosol Powder, Breath Activated inh No wheezes, rales or rhonchi    Assessment:/Plan:  Vertigo  (primary encounter diagnosis)    May be secondary to sinus symptoms. Call if it does not improve once sinus symptoms clear up. Blood in stool    Resolved after using stool softener.   Advised to m

## 2019-05-06 RX ORDER — MONTELUKAST SODIUM 10 MG/1
10 TABLET ORAL NIGHTLY
Qty: 90 TABLET | Refills: 1 | Status: SHIPPED | OUTPATIENT
Start: 2019-05-06 | End: 2019-07-26

## 2019-05-07 ENCOUNTER — NURSE TRIAGE (OUTPATIENT)
Dept: OTHER | Age: 69
End: 2019-05-07

## 2019-05-07 DIAGNOSIS — J45.21 MILD INTERMITTENT ASTHMA WITH EXACERBATION: Primary | ICD-10-CM

## 2019-05-07 RX ORDER — PREDNISONE 20 MG/1
20 TABLET ORAL DAILY
Qty: 5 TABLET | Refills: 0 | Status: SHIPPED | OUTPATIENT
Start: 2019-05-07 | End: 2019-05-12

## 2019-05-07 NOTE — TELEPHONE ENCOUNTER
Spoke with patient and advised that Dr Nakita Wilcox sent prednisone prescription to her pharmacy, verbalized understanding.

## 2019-05-07 NOTE — TELEPHONE ENCOUNTER
Action Requested: Summary for Provider     []  Critical Lab, Recommendations Needed  [] Need Additional Advice  []   FYI    []   Need Orders  [] Need Medications Sent to Pharmacy  []  Other     SUMMARY:    The patient was offered an appointment but refused

## 2019-05-10 NOTE — TELEPHONE ENCOUNTER
Please review; protocol failed.     Requested Prescriptions     Pending Prescriptions Disp Refills   • metFORMIN HCl 500 MG Oral Tab 90 tablet 0     Sig: TAKE ONE TABLET BY MOUTH ONE TIME DAILY with breakfast         Recent Visits  Date Type Provider Dept

## 2019-05-16 RX ORDER — FLUTICASONE PROPIONATE AND SALMETEROL 250; 50 UG/1; UG/1
POWDER RESPIRATORY (INHALATION)
Qty: 180 EACH | Refills: 1 | Status: SHIPPED | OUTPATIENT
Start: 2019-05-16 | End: 2019-07-08

## 2019-05-16 NOTE — TELEPHONE ENCOUNTER
Refill passed per [facility] Adry protocol.   Refill Protocol Appointment Criteria  · Appointment scheduled in the past 6 months or in the next 3 months  Recent Outpatient Visits            1 month ago Vertigo    [address]

## 2019-05-16 NOTE — TELEPHONE ENCOUNTER
Refill passed per [facility] [place], [facility] protocol.   Refill Protocol Appointment Criteria  · Appointment scheduled in the past 6 months or in the next 3 months  Recent Outpatient Visits            1 month ago Vertigo    [address] [place] [address]

## 2019-05-30 RX ORDER — OMEPRAZOLE 20 MG/1
20 CAPSULE, DELAYED RELEASE ORAL EVERY MORNING
Qty: 90 CAPSULE | Refills: 1 | Status: SHIPPED | OUTPATIENT
Start: 2019-05-30 | End: 2019-07-08

## 2019-06-12 ENCOUNTER — TELEPHONE (OUTPATIENT)
Dept: OTHER | Age: 69
End: 2019-06-12

## 2019-06-12 DIAGNOSIS — Z12.39 SCREENING FOR BREAST CANCER: Primary | ICD-10-CM

## 2019-06-12 NOTE — TELEPHONE ENCOUNTER
Dr. Rufino Mondragon for Sia De La Paz:  Patient needs yearly mammogram order. She has appt 6/19/19. Needs to get faxed to:  [facility]. # [phone]  FAX #[phone]    Staff please let patient know when order is faxed.   Please respond to po

## 2019-06-18 ENCOUNTER — NURSE TRIAGE (OUTPATIENT)
Dept: OTHER | Age: 69
End: 2019-06-18

## 2019-06-18 NOTE — TELEPHONE ENCOUNTER
Action Requested: Summary for Provider     []  Critical Lab, Recommendations Needed  [] Need Additional Advice  []   FYI    []   Need Orders  [] Need Medications Sent to Pharmacy  []  Other     SUMMARY:  Per protocol advised OV today or tomorrow.  Pt agreed

## 2019-06-22 RX ORDER — PRAVASTATIN SODIUM 10 MG
TABLET ORAL
Qty: 90 TABLET | Refills: 1 | Status: SHIPPED | OUTPATIENT
Start: 2019-06-22 | End: 2020-02-19

## 2019-06-22 NOTE — TELEPHONE ENCOUNTER
Please review; protocol failed.     Requested Prescriptions     Pending Prescriptions Disp Refills   • PRAVASTATIN SODIUM 10 MG Oral Tab [Pharmacy Med Name: Pravastatin Sodium 10 Mg Tab ] 90 tablet 0     Sig: TAKE ONE TABLET BY MOUTH NIGHTLY         Rec

## 2019-07-02 ENCOUNTER — HOSPITAL ENCOUNTER (OUTPATIENT)
Dept: MAMMOGRAPHY | Age: 69
Discharge: HOME | End: 2019-07-02
Attending: FAMILY MEDICINE
Payer: MEDICARE

## 2019-07-02 DIAGNOSIS — Z12.39 SCREENING FOR BREAST CANCER: ICD-10-CM

## 2019-07-02 PROCEDURE — 77067 SCR MAMMO BI INCL CAD: CPT | Performed by: FAMILY MEDICINE

## 2019-07-02 PROCEDURE — 77063 BREAST TOMOSYNTHESIS BI: CPT | Performed by: FAMILY MEDICINE

## 2019-07-08 ENCOUNTER — OFFICE VISIT (OUTPATIENT)
Dept: FAMILY MEDICINE CLINIC | Facility: CLINIC | Age: 69
End: 2019-07-08
Payer: MEDICARE

## 2019-07-08 ENCOUNTER — APPOINTMENT (OUTPATIENT)
Dept: LAB | Facility: HOSPITAL | Age: 69
End: 2019-07-08
Attending: FAMILY MEDICINE
Payer: MEDICARE

## 2019-07-08 VITALS
BODY MASS INDEX: 38.32 KG/M2 | TEMPERATURE: 97 F | DIASTOLIC BLOOD PRESSURE: 81 MMHG | RESPIRATION RATE: 16 BRPM | HEART RATE: 72 BPM | SYSTOLIC BLOOD PRESSURE: 130 MMHG | WEIGHT: 219 LBS | HEIGHT: 63.5 IN

## 2019-07-08 DIAGNOSIS — I10 ESSENTIAL HYPERTENSION: ICD-10-CM

## 2019-07-08 DIAGNOSIS — J44.9 ASTHMA WITH COPD (CHRONIC OBSTRUCTIVE PULMONARY DISEASE) (HCC): Chronic | ICD-10-CM

## 2019-07-08 DIAGNOSIS — H43.393 FLOATERS IN VISUAL FIELD, BILATERAL: ICD-10-CM

## 2019-07-08 DIAGNOSIS — H25.13 AGE-RELATED NUCLEAR CATARACT OF BOTH EYES: ICD-10-CM

## 2019-07-08 DIAGNOSIS — D12.6 BENIGN NEOPLASM OF COLON, UNSPECIFIED PART OF COLON: ICD-10-CM

## 2019-07-08 DIAGNOSIS — E11.9 TYPE 2 DIABETES MELLITUS WITHOUT COMPLICATION, WITHOUT LONG-TERM CURRENT USE OF INSULIN (HCC): ICD-10-CM

## 2019-07-08 DIAGNOSIS — Z00.00 ROUTINE PHYSICAL EXAMINATION: Primary | ICD-10-CM

## 2019-07-08 DIAGNOSIS — E66.01 SEVERE OBESITY (BMI 35.0-39.9) WITH COMORBIDITY (HCC): ICD-10-CM

## 2019-07-08 DIAGNOSIS — G47.33 OBSTRUCTIVE SLEEP APNEA: ICD-10-CM

## 2019-07-08 DIAGNOSIS — Z00.00 ENCOUNTER FOR ANNUAL HEALTH EXAMINATION: ICD-10-CM

## 2019-07-08 DIAGNOSIS — J44.9 CHRONIC OBSTRUCTIVE PULMONARY DISEASE, UNSPECIFIED COPD TYPE (HCC): ICD-10-CM

## 2019-07-08 PROCEDURE — G0439 PPPS, SUBSEQ VISIT: HCPCS | Performed by: FAMILY MEDICINE

## 2019-07-08 PROCEDURE — 96160 PT-FOCUSED HLTH RISK ASSMT: CPT | Performed by: FAMILY MEDICINE

## 2019-07-08 PROCEDURE — 36415 COLL VENOUS BLD VENIPUNCTURE: CPT

## 2019-07-08 PROCEDURE — 99397 PER PM REEVAL EST PAT 65+ YR: CPT | Performed by: FAMILY MEDICINE

## 2019-07-08 NOTE — PROGRESS NOTES
HPI:   Sofia Chong is a 70year old female who presents for a Medicare Subsequent Annual Wellness visit (Pt already had Initial Annual Wellness). 70 yr old female who presents for Medicare physical. Feeling well.  Going to [place] for her Nathan Kern Family/surrogate (if present), and forms available to patient in AVS       She does NOT have a Power of  for [employer] on file in [address]    Advance care planning including the explanation and discussion of advance directives standard forms performe Results   Component Value Date    WBC 9.6 02/18/2015    HGB 14.9 02/18/2015     02/18/2015        ALLERGIES:   She is allergic to penicillins.     CURRENT MEDICATIONS:     Outpatient Medications Marked as Taking for the 7/8/19 encounter (Office Visit brother. SOCIAL HISTORY:   She  reports that she quit smoking about 39 years ago. She quit after 30.00 years of use. She has never used smokeless tobacco. She reports that she drinks about 0.6 oz of alcohol per week.  She reports that she does not use dionne Uncorrected Right Eye Chart Acuity: 20/25   Left Eye Visual Acuity: Uncorrected Left Eye Chart Acuity: 20/20   Both Eyes Visual Acuity: Uncorrected Both Eyes Chart Acuity: 20/20   Able To Tolerate Visual Acuity: Yes      Gen:  Well-nourished. No distress. ([facility] [facility]  -     COMP METABOLIC PANEL (14)  -     LIPID PANEL  -     HEMOGLOBIN A1C  -     MICROALB/CREAT RATIO, RANDOM URINE    Asthma with copd (chronic obstructive pulmonary disease) (hcc)    Controlled.   CPM.   Obstructive sleep apnea    Will assess at nex Electrocardiogram date       Colorectal Cancer Screening      Colonoscopy Screen every 10 years Colonoscopy due on 07/25/2020 Update Health Maintenance if applicable    Flex Sigmoidoscopy Screen every 10 years No results found for this or any previous visi be covered with your prescription benefits, but Medicare does not cover unless Medically needed    Zoster  Not covered by Medicare Part B No vaccine history found This may be covered with your pharmacy  prescription benefits      SPECIFIC DISEASE MONITORIN

## 2019-07-09 LAB
ALBUMIN/GLOBULIN RATIO: 1.3 (CALC) (ref 1–2.5)
ALBUMIN: 4.3 G/DL (ref 3.6–5.1)
ALKALINE PHOSPHATASE: 94 U/L (ref 33–130)
ALT: 21 U/L (ref 6–29)
AST: 20 U/L (ref 10–35)
BILIRUBIN, TOTAL: 0.5 MG/DL (ref 0.2–1.2)
BUN: 21 MG/DL (ref 7–25)
CALCIUM: 9.8 MG/DL (ref 8.6–10.4)
CARBON DIOXIDE: 29 MMOL/L (ref 20–32)
CHLORIDE: 103 MMOL/L (ref 98–110)
CHOL/HDLC RATIO: 3.2 (CALC)
CHOLESTEROL, TOTAL: 151 MG/DL
CREATININE, RANDOM URINE: 60 MG/DL (ref 20–275)
CREATININE: 0.87 MG/DL (ref 0.5–0.99)
EGFR IF AFRICN AM: 79 ML/MIN/1.73M2
EGFR IF NONAFRICN AM: 68 ML/MIN/1.73M2
GLOBULIN: 3.2 G/DL (CALC) (ref 1.9–3.7)
GLUCOSE: 109 MG/DL (ref 65–99)
HDL CHOLESTEROL: 47 MG/DL
HEMOGLOBIN A1C: 6.7 % OF TOTAL HGB
LDL-CHOLESTEROL: 86 MG/DL (CALC)
MICROALBUMIN/CREATININE RATIO, RANDOM URINE: 3 MCG/MG CREAT
MICROALBUMIN: 0.2 MG/DL
NON-HDL CHOLESTEROL: 104 MG/DL (CALC)
POTASSIUM: 4.5 MMOL/L (ref 3.5–5.3)
PROTEIN, TOTAL: 7.5 G/DL (ref 6.1–8.1)
SODIUM: 138 MMOL/L (ref 135–146)
TRIGLYCERIDES: 87 MG/DL

## 2019-07-19 NOTE — PATIENT INSTRUCTIONS
Adriano Morales's SCREENING SCHEDULE   Tests on this list are recommended by your physician but may not be covered, or covered at this frequency, by your insurer. Please check with your insurance carrier before scheduling to verify coverage.    PREVENTATIV Screen   Covered every 10 years- more often if abnormal Colonoscopy due on 07/25/2020 Update [facility] if applicable    Flex Sigmoidoscopy Screen  Covered every 5 years No results found for this or any previous visit. No flowsheet data found. performed in visit on 10/26/16   • PNEUMOCOCCAL VACC, 13 EARNESTINE IM    Please get once after your 65th birthday    Pneumococcal 23 (Pneumovax)  Covered Once after 65 Orders placed or performed in visit on 10/10/18   • PNEUMOCOCCAL IMM (PNEUMOVAX)    Please get

## 2019-07-26 RX ORDER — MONTELUKAST SODIUM 10 MG/1
TABLET ORAL
Qty: 90 TABLET | Refills: 1 | Status: SHIPPED | OUTPATIENT
Start: 2019-07-26 | End: 2019-10-31

## 2019-07-26 NOTE — TELEPHONE ENCOUNTER
Refill Protocol Appointment Criteria  · Appointment scheduled in the past 6 months or in the next 3 months  Recent Outpatient Visits            2 weeks ago Routine physical examination    [facility], Nemo Partdia, Marko Araujo, [address]

## 2019-07-31 ENCOUNTER — NURSE TRIAGE (OUTPATIENT)
Dept: FAMILY MEDICINE CLINIC | Facility: CLINIC | Age: 69
End: 2019-07-31

## 2019-07-31 RX ORDER — AZITHROMYCIN 250 MG/1
TABLET, FILM COATED ORAL
Qty: 6 TABLET | Refills: 0 | Status: SHIPPED | OUTPATIENT
Start: 2019-07-31 | End: 2020-01-22

## 2019-07-31 NOTE — TELEPHONE ENCOUNTER
Action Requested: Summary for Provider     []  Critical Lab, Recommendations Needed  [] Need Additional Advice  []   FYI    []   Need Orders  [] Need Medications Sent to Pharmacy  []  Other     SUMMARY: Per protocol advised OV today/tomorrow but pt declini

## 2019-10-15 RX ORDER — ALBUTEROL SULFATE 90 UG/1
2 AEROSOL, METERED RESPIRATORY (INHALATION) EVERY 4 HOURS PRN
Qty: 1 INHALER | Refills: 1 | Status: SHIPPED | OUTPATIENT
Start: 2019-10-15 | End: 2020-03-18

## 2019-11-02 RX ORDER — MONTELUKAST SODIUM 10 MG/1
10 TABLET ORAL NIGHTLY
Qty: 90 TABLET | Refills: 1 | Status: SHIPPED | OUTPATIENT
Start: 2019-11-02 | End: 2020-05-01

## 2019-11-02 RX ORDER — OMEPRAZOLE 20 MG/1
20 CAPSULE, DELAYED RELEASE ORAL EVERY MORNING
Qty: 90 CAPSULE | Refills: 1 | Status: SHIPPED | OUTPATIENT
Start: 2019-11-02 | End: 2020-05-01

## 2019-11-02 NOTE — TELEPHONE ENCOUNTER
Refill passed per [facility], [facility] protocol.   Diabetes Medications  Protocol Criteria:  · Appointment scheduled in the past 6 months or the next 3 months  · A1C < 7.5 in the past 6 months  · Creatinine in the past 12 months  · Creatinine result < 1.5   Rece

## 2019-11-14 RX ORDER — FLUTICASONE PROPIONATE AND SALMETEROL 250; 50 UG/1; UG/1
POWDER RESPIRATORY (INHALATION)
Qty: 1 EACH | Refills: 0 | Status: SHIPPED | OUTPATIENT
Start: 2019-11-14 | End: 2019-12-15

## 2019-12-16 RX ORDER — FLUTICASONE PROPIONATE AND SALMETEROL 250; 50 UG/1; UG/1
POWDER RESPIRATORY (INHALATION)
Qty: 3 PACKAGE | Refills: 1 | Status: SHIPPED | OUTPATIENT
Start: 2019-12-16 | End: 2020-03-22

## 2019-12-16 NOTE — TELEPHONE ENCOUNTER
Refill passed per [facility], [facility] protocol.   Refill Protocol Appointment Criteria  · Appointment scheduled in the past 6 months or in the next 3 months  Recent Outpatient Visits            5 months ago Routine physical examination    Adenike Wasserman

## 2019-12-18 ENCOUNTER — NURSE TRIAGE (OUTPATIENT)
Dept: FAMILY MEDICINE CLINIC | Facility: CLINIC | Age: 69
End: 2019-12-18

## 2019-12-18 NOTE — TELEPHONE ENCOUNTER
Informed pt Dr. Emerita Garg instructions. Pt states she will try to increase fluid and Gatorade. No further questions or concerns.

## 2019-12-18 NOTE — TELEPHONE ENCOUNTER
Action Requested: Summary for Provider     []  Critical Lab, Recommendations Needed  [] Need Additional Advice  []   FYI    []   Need Orders  [] Need Medications Sent to Pharmacy  []  Other     SUMMARY: Adelaide Pike pt stated that her grandson was over and he

## 2019-12-18 NOTE — TELEPHONE ENCOUNTER
You can certainly double book her in. Not sure how much I can do for her. Encourage fluids. May want to try some Gatarode.

## 2020-01-02 ENCOUNTER — TELEPHONE (OUTPATIENT)
Dept: FAMILY MEDICINE CLINIC | Facility: CLINIC | Age: 70
End: 2020-01-02

## 2020-01-02 DIAGNOSIS — H53.9 VISION CHANGES: Primary | ICD-10-CM

## 2020-01-02 NOTE — TELEPHONE ENCOUNTER
Patient states for approximately one month seeing \"flashes\" out of right eye and now just seeing floaters. Patient denies any injury, headache, pain or other symptoms and requesting a referral to an Ophthalmologist.      Please advise.

## 2020-01-03 ENCOUNTER — TELEPHONE (OUTPATIENT)
Dept: OPHTHALMOLOGY | Facility: CLINIC | Age: 70
End: 2020-01-03

## 2020-01-03 ENCOUNTER — TELEPHONE (OUTPATIENT)
Dept: OTHER | Age: 70
End: 2020-01-03

## 2020-01-03 DIAGNOSIS — H43.391 VITREOUS FLOATERS OF RIGHT EYE: Primary | ICD-10-CM

## 2020-01-03 DIAGNOSIS — H43.391 FLOATER, VITREOUS, RIGHT: Primary | ICD-10-CM

## 2020-01-03 NOTE — TELEPHONE ENCOUNTER
[facility] needs [facility] to [facility]. She will see them today or tomorrow for flashes and  floaters in the right eye.

## 2020-01-03 NOTE — TELEPHONE ENCOUNTER
I spoke with this patient multiple times today she was offered an appointment with Dr. Caleb Ghotra today, but she was not able to come to see them today due to transportation issues.    She also was offered an appointment for tomorrow- Saturday, 1/4/2020, bu

## 2020-01-03 NOTE — TELEPHONE ENCOUNTER
Per Dr. Jonnathan Gillespie office patient is having flashers and floaters right eye   Patient refusing to go to [facility] on 1/4/20   Tanisha at [facility].  Made appointment for patient Monday 1/6/20 at 11:10 am with Dr. Hernesto Livingston at [place] [id number]

## 2020-01-03 NOTE — TELEPHONE ENCOUNTER
Patient complains of visual changes in the right eye. She says symptoms started 2 weeks ago with flashes of light in the right eye. Now, for the last 5 days, she complains of circles and string like shapes in the right eye.   She denies any loss of vision

## 2020-01-03 NOTE — TELEPHONE ENCOUNTER
Patient calling for referral Dr. Jett Santana for tomorrow . . called [facility] and they said Dr. Jett Santana is not in Saturday 1/4/20    Patient states does not want to go to [facility] too far   Can patient wait until Monday? For appointment. ?  Is Dr. Sara Cui

## 2020-01-22 ENCOUNTER — APPOINTMENT (OUTPATIENT)
Dept: LAB | Age: 70
End: 2020-01-22
Attending: FAMILY MEDICINE
Payer: MEDICARE

## 2020-01-22 ENCOUNTER — OFFICE VISIT (OUTPATIENT)
Dept: FAMILY MEDICINE CLINIC | Facility: CLINIC | Age: 70
End: 2020-01-22
Payer: MEDICARE

## 2020-01-22 VITALS
RESPIRATION RATE: 18 BRPM | WEIGHT: 228 LBS | SYSTOLIC BLOOD PRESSURE: 152 MMHG | HEART RATE: 93 BPM | BODY MASS INDEX: 40 KG/M2 | TEMPERATURE: 99 F | DIASTOLIC BLOOD PRESSURE: 85 MMHG

## 2020-01-22 DIAGNOSIS — I10 ESSENTIAL HYPERTENSION: ICD-10-CM

## 2020-01-22 DIAGNOSIS — B37.2 YEAST INFECTION OF THE SKIN: ICD-10-CM

## 2020-01-22 DIAGNOSIS — E11.9 TYPE 2 DIABETES MELLITUS WITHOUT COMPLICATION, WITHOUT LONG-TERM CURRENT USE OF INSULIN (HCC): Primary | ICD-10-CM

## 2020-01-22 DIAGNOSIS — K63.5 POLYP OF COLON, UNSPECIFIED PART OF COLON, UNSPECIFIED TYPE: ICD-10-CM

## 2020-01-22 PROCEDURE — 36415 COLL VENOUS BLD VENIPUNCTURE: CPT

## 2020-01-22 PROCEDURE — 99214 OFFICE O/P EST MOD 30 MIN: CPT | Performed by: FAMILY MEDICINE

## 2020-01-22 RX ORDER — NYSTATIN 100000 U/G
1 CREAM TOPICAL 2 TIMES DAILY
Qty: 30 G | Refills: 3 | Status: SHIPPED | OUTPATIENT
Start: 2020-01-22 | End: 2020-05-24

## 2020-01-22 NOTE — PROGRESS NOTES
HPI: Tanner Mccormack is a 78year old female who presents for follow-up. Went to [place] for the holidays. States she ate a lot over the  [place]. Planning to start Weight Watchers this Friday. Has worked in the past. Not exercising.  Busy with 1 yr old grandson now Vitamins-Minerals (CENTRUM SILVER) Oral Tab, Take  by mouth.  Take one tablet by mouth daily, Disp: , Rfl:   Glucose Blood In Vitro Strip, Check glucose once daily, Disp: 100 strip, Rfl: 2  Glucose Blood In Vitro Strip, Check blood sugar once daily, Disp: 1 powder has helped in the past.  Will start nystatin cream.    Seamus Green, DO

## 2020-01-23 LAB — HEMOGLOBIN A1C: 7.3 % OF TOTAL HGB

## 2020-02-15 ENCOUNTER — NURSE TRIAGE (OUTPATIENT)
Dept: FAMILY MEDICINE CLINIC | Facility: CLINIC | Age: 70
End: 2020-02-15

## 2020-02-15 RX ORDER — PREDNISONE 20 MG/1
40 TABLET ORAL DAILY
Qty: 10 TABLET | Refills: 0 | Status: SHIPPED | OUTPATIENT
Start: 2020-02-15 | End: 2021-11-01

## 2020-02-15 NOTE — TELEPHONE ENCOUNTER
Advised patient on Dr. Ramirez De Souza information and recommendations. Reviewed prednisone. Advised to call back Monday if not better, and to go to [facility] today if worsening. Patient verbalized understanding.

## 2020-02-15 NOTE — TELEPHONE ENCOUNTER
Please let her know Rx sent to pharmacy. Be sure to continue albuterol and Advair. Call if not improved in 2 days, immediate care sooner if worsening.

## 2020-02-15 NOTE — TELEPHONE ENCOUNTER
Action Requested: Summary for Provider     []  Critical Lab, Recommendations Needed  [x] Need Additional Advice  []   FYI    []   Need Orders  [x] Need Medications Sent to Pharmacy  []  Other     SUMMARY: Dr Marylou Cortes for Dr Roselyn Head, please advise.  Offered lizz

## 2020-02-19 RX ORDER — PRAVASTATIN SODIUM 10 MG
TABLET ORAL
Qty: 90 TABLET | Refills: 1 | Status: SHIPPED | OUTPATIENT
Start: 2020-02-19 | End: 2020-07-17

## 2020-02-20 NOTE — TELEPHONE ENCOUNTER
Refill passed per [facility], [facility] protocol.   Cholesterol Medications  Protocol Criteria:  · Appointment scheduled in the past 12 months or in the next 3 months  · ALT & LDL on file in the past 12 months  · ALT result < 80  · LDL result <130   Recent Outpat

## 2020-02-26 RX ORDER — FLUTICASONE PROPIONATE 50 MCG
2 SPRAY, SUSPENSION (ML) NASAL DAILY
Qty: 3 BOTTLE | Refills: 1 | Status: SHIPPED | OUTPATIENT
Start: 2020-02-26

## 2020-02-27 NOTE — TELEPHONE ENCOUNTER
Refill passed per [facility], [facility] protocol.   Refill Protocol Appointment Criteria  · Appointment scheduled in the past 12 months or in the next 3 months  Recent Outpatient Visits            1 month ago Type 2 diabetes mellitus without complication, without

## 2020-03-06 NOTE — TELEPHONE ENCOUNTER
Refill passed per [facility] [place], [facility] protocol.   Refill Protocol Appointment Criteria  · Appointment scheduled in the past 12 months or in the next 3 months  Recent Outpatient Visits            1 month ago Type 2 diabetes mellitus without complication, without

## 2020-03-19 RX ORDER — ALBUTEROL SULFATE 90 UG/1
2 AEROSOL, METERED RESPIRATORY (INHALATION) EVERY 4 HOURS PRN
Qty: 3 INHALER | Refills: 1 | Status: SHIPPED | OUTPATIENT
Start: 2020-03-19 | End: 2021-04-08

## 2020-03-23 RX ORDER — FLUTICASONE PROPIONATE AND SALMETEROL 250; 50 UG/1; UG/1
POWDER RESPIRATORY (INHALATION)
Qty: 3 PACKAGE | Refills: 1 | Status: SHIPPED | OUTPATIENT
Start: 2020-03-23 | End: 2020-12-13

## 2020-05-01 RX ORDER — OMEPRAZOLE 20 MG/1
CAPSULE, DELAYED RELEASE ORAL
Qty: 90 CAPSULE | Refills: 0 | Status: SHIPPED | OUTPATIENT
Start: 2020-05-01 | End: 2020-08-01

## 2020-05-01 RX ORDER — MONTELUKAST SODIUM 10 MG/1
10 TABLET ORAL NIGHTLY
Qty: 90 TABLET | Refills: 0 | Status: SHIPPED | OUTPATIENT
Start: 2020-05-01 | End: 2020-05-05

## 2020-05-05 RX ORDER — MONTELUKAST SODIUM 10 MG/1
10 TABLET ORAL NIGHTLY
Qty: 90 TABLET | Refills: 0 | Status: SHIPPED | OUTPATIENT
Start: 2020-05-05 | End: 2020-08-01

## 2020-05-26 RX ORDER — NYSTATIN 100000 U/G
1 CREAM TOPICAL 2 TIMES DAILY
Qty: 30 G | Refills: 3 | Status: SHIPPED | OUTPATIENT
Start: 2020-05-26 | End: 2020-08-01

## 2020-06-04 ENCOUNTER — TELEPHONE (OUTPATIENT)
Dept: FAMILY MEDICINE CLINIC | Facility: CLINIC | Age: 70
End: 2020-06-04

## 2020-06-04 DIAGNOSIS — Z12.39 SCREENING FOR BREAST CANCER: Primary | ICD-10-CM

## 2020-06-04 NOTE — TELEPHONE ENCOUNTER
Dr. Dalia Barber, patient is requesting a mammogram order. Last mammogram was completed 07/02/2019. Please advise on order.        CONCLUSION:       BI-RADS CATEGORY:     DIAGNOSTIC CATEGORY 2--BENIGN FINDING:       RECOMMENDATIONS:    ROUTINE MAMMOGRAM AND CLINI

## 2020-06-19 ENCOUNTER — TELEPHONE (OUTPATIENT)
Dept: GASTROENTEROLOGY | Facility: CLINIC | Age: 70
End: 2020-06-19

## 2020-06-19 ENCOUNTER — TELEPHONE (OUTPATIENT)
Dept: FAMILY MEDICINE CLINIC | Facility: CLINIC | Age: 70
End: 2020-06-19

## 2020-06-19 DIAGNOSIS — R10.84 GENERALIZED ABDOMINAL PAIN: Primary | ICD-10-CM

## 2020-07-07 ENCOUNTER — HOSPITAL ENCOUNTER (OUTPATIENT)
Dept: MAMMOGRAPHY | Age: 70
Discharge: HOME | End: 2020-07-07
Attending: FAMILY MEDICINE
Payer: MEDICARE

## 2020-07-07 DIAGNOSIS — Z12.39 SCREENING FOR BREAST CANCER: ICD-10-CM

## 2020-07-07 PROCEDURE — 77067 SCR MAMMO BI INCL CAD: CPT | Performed by: FAMILY MEDICINE

## 2020-07-07 PROCEDURE — 77063 BREAST TOMOSYNTHESIS BI: CPT | Performed by: FAMILY MEDICINE

## 2020-07-19 RX ORDER — PRAVASTATIN SODIUM 10 MG
TABLET ORAL
Qty: 90 TABLET | Refills: 1 | Status: SHIPPED | OUTPATIENT
Start: 2020-07-19 | End: 2021-06-21

## 2020-07-20 ENCOUNTER — NURSE ONLY (OUTPATIENT)
Dept: GASTROENTEROLOGY | Facility: CLINIC | Age: 70
End: 2020-07-20

## 2020-07-20 NOTE — PROGRESS NOTES
Forwarded to [facility] JOEL BELL. This is a recall. Meds/allergies reviewed.  See reports below    Last Procedure, Date, MD:  Dr Gabby Munoz 7/25/17 for screening  Last Diagnosis:  polyps  Recalled for (mth/yrs): 3 yrs  Sedation used previously:  MAC  Last Prep Used (if Procedure:  Informed consent was obtained from the patient after the risks of the procedure were discussed, including but not limited to bleeding, perforation, aspiration, infection, or possibility of a missed lesion.  After discussions of the risks/benefit · Seven small polyps removed. · Small internal hemorrhoids.      Recommend:  · Await pathology. Repeat colonoscopy in 3 years. · High fiber diet. · Monitor for GI bleeding.   · Reduce/eliminate red meat intake, increase vegetable/fruits in diet.      >>> · No evidence of severe dysplasia/carcinoma in situ or infiltrating carcinoma identified.     B. Descending colon polyps; polypectomy:  · Fragments of tubular adenoma and fragment of hyperplastic polyp (0.6 cm in maximum dimension in aggregate).   · No evid Scans on Order [id number]     Document on 7/26/2017 10:22 AM by Christiano Freed MD            Lab IDs     Specimen #   [id number]    Specimen Information     Specimen Type: Tissue   Specimen Source: Colon polyp       Collected: Taina Smith

## 2020-07-21 ENCOUNTER — TELEPHONE (OUTPATIENT)
Dept: FAMILY MEDICINE CLINIC | Facility: CLINIC | Age: 70
End: 2020-07-21

## 2020-07-21 DIAGNOSIS — R10.84 GENERALIZED ABDOMINAL PAIN: Primary | ICD-10-CM

## 2020-07-21 NOTE — PROGRESS NOTES
Patient contacted, explained below. Accepted appt with Zev Dias July 28, arrival 1:45pm and given directions to [facility]. Reminded to obtain referral from PCP.

## 2020-07-21 NOTE — TELEPHONE ENCOUNTER
Referral to Marita Acevedo PA-C was already approved on 06/20/2020.  Referral updated to show NIKKIE Kathleen

## 2020-07-28 ENCOUNTER — OFFICE VISIT (OUTPATIENT)
Dept: GASTROENTEROLOGY | Facility: CLINIC | Age: 70
End: 2020-07-28
Payer: MEDICARE

## 2020-07-28 ENCOUNTER — TELEPHONE (OUTPATIENT)
Dept: GASTROENTEROLOGY | Facility: CLINIC | Age: 70
End: 2020-07-28

## 2020-07-28 VITALS
WEIGHT: 229 LBS | BODY MASS INDEX: 40.07 KG/M2 | HEART RATE: 77 BPM | SYSTOLIC BLOOD PRESSURE: 144 MMHG | HEIGHT: 63.5 IN | DIASTOLIC BLOOD PRESSURE: 79 MMHG

## 2020-07-28 DIAGNOSIS — E66.01 SEVERE OBESITY (BMI 35.0-39.9) WITH COMORBIDITY (HCC): Chronic | ICD-10-CM

## 2020-07-28 DIAGNOSIS — R12 HEARTBURN: ICD-10-CM

## 2020-07-28 DIAGNOSIS — Z86.010 HISTORY OF COLON POLYPS: ICD-10-CM

## 2020-07-28 DIAGNOSIS — G47.33 OBSTRUCTIVE SLEEP APNEA: Primary | ICD-10-CM

## 2020-07-28 DIAGNOSIS — Z86.010 HX OF COLONIC POLYPS: Primary | ICD-10-CM

## 2020-07-28 PROCEDURE — 3077F SYST BP >= 140 MM HG: CPT | Performed by: NURSE PRACTITIONER

## 2020-07-28 PROCEDURE — 99214 OFFICE O/P EST MOD 30 MIN: CPT | Performed by: NURSE PRACTITIONER

## 2020-07-28 PROCEDURE — 3008F BODY MASS INDEX DOCD: CPT | Performed by: NURSE PRACTITIONER

## 2020-07-28 PROCEDURE — 3078F DIAST BP <80 MM HG: CPT | Performed by: NURSE PRACTITIONER

## 2020-07-28 RX ORDER — POLYETHYLENE GLYCOL 3350, SODIUM CHLORIDE, SODIUM BICARBONATE, POTASSIUM CHLORIDE 420; 11.2; 5.72; 1.48 G/4L; G/4L; G/4L; G/4L
POWDER, FOR SOLUTION ORAL
Qty: 1 BOTTLE | Refills: 0 | Status: ON HOLD | OUTPATIENT
Start: 2020-07-28 | End: 2020-09-23

## 2020-07-28 NOTE — PATIENT INSTRUCTIONS
1. Schedule colonoscopy with mac w/ Dr. Jennifer Soliman [Diagnosis: h/o polyps]    2.  bowel prep from pharmacy (split trilyte)    3. Do not take metformin day before and day of procedure    4. Read all bowel prep instructions carefully    5.  AVOID seeds, nut reserved. This information is not intended as a substitute for professional medical care. Always follow your healthcare professional's instructions.

## 2020-07-28 NOTE — TELEPHONE ENCOUNTER
Scheduled for:  Colonoscopy [address]2714  Provider Name: Dr Monet Del Rio  Date: Wed 9/23/2020   Location: [facility]   Sedation: MAC   Time: 9:00 am   Prep: split dose trilyte   Meds/Allergies Reconciled?:  PCN  Diagnosis with codes:  HCP Z86.010  Was patient informed to call

## 2020-07-28 NOTE — PROGRESS NOTES
[address] [facility] [address] Gastroenterology                                                                                                               Reason for consult: h kg)  11/27/18 : 211 lb 12.8 oz (96.1 kg)  10/10/18 : 211 lb (95.7 kg)       History, Medications, Allergies, ROS:      Past Medical History:   Diagnosis Date   • Asthma    • Chest pain    • Diabetes ([facility] [address]    • Diverticulosis    • Esophageal reflux    • Ext Aerosol Powder, Breath Activated Inhale one puff every 12 hours 3 Package 1   • Albuterol Sulfate HFA (PROAIR HFA) 108 (90 Base) MCG/ACT Inhalation Aero Soln Inhale 2 puffs into the lungs every 4 (four) hours as needed for Wheezing.  inhale 2 puff by inhala anxiety and poor appetite    PHYSICAL EXAM:   Blood pressure 144/79, pulse 77, height 5' 3.5\" (1.613 m), weight 229 lb (103.9 kg), not currently breastfeeding.     GEN: WD/WN, NAD  HEENT: Supple symmetrical, trachea midline  CV: RRR, the extremities are wa CREATININE  0.50 - 0.99 mg/dL 0.87    Comment: For patients >52years of age, the reference limit   for Creatinine is approximately 13% higher for people   identified as -American.        eGFR NON-AFR.  AMERICAN  > OR = 60 mL/min/1.73m2 68    eGFR with decision. -nexium 40 mg once daily  -gerd diet modification  -consider egd    1. Schedule colonoscopy with mac w/ Dr. Jhon Marlow [Diagnosis: h/o polyps]    2.  bowel prep from pharmacy (split trilyte)    3.  Do not take metformin day before and day

## 2020-07-30 ENCOUNTER — OFFICE VISIT (OUTPATIENT)
Dept: FAMILY MEDICINE CLINIC | Facility: CLINIC | Age: 70
End: 2020-07-30
Payer: MEDICARE

## 2020-07-30 ENCOUNTER — TELEPHONE (OUTPATIENT)
Dept: GASTROENTEROLOGY | Facility: CLINIC | Age: 70
End: 2020-07-30

## 2020-07-30 ENCOUNTER — APPOINTMENT (OUTPATIENT)
Dept: LAB | Age: 70
End: 2020-07-30
Attending: FAMILY MEDICINE
Payer: MEDICARE

## 2020-07-30 VITALS
RESPIRATION RATE: 16 BRPM | WEIGHT: 228.81 LBS | BODY MASS INDEX: 40.04 KG/M2 | DIASTOLIC BLOOD PRESSURE: 80 MMHG | TEMPERATURE: 97 F | HEIGHT: 63.5 IN | HEART RATE: 85 BPM | SYSTOLIC BLOOD PRESSURE: 133 MMHG

## 2020-07-30 DIAGNOSIS — G47.33 OBSTRUCTIVE SLEEP APNEA: ICD-10-CM

## 2020-07-30 DIAGNOSIS — Z00.00 ENCOUNTER FOR ANNUAL HEALTH EXAMINATION: Primary | ICD-10-CM

## 2020-07-30 DIAGNOSIS — J44.9 ASTHMA WITH COPD (CHRONIC OBSTRUCTIVE PULMONARY DISEASE) (HCC): Chronic | ICD-10-CM

## 2020-07-30 DIAGNOSIS — H43.393 FLOATERS IN VISUAL FIELD, BILATERAL: ICD-10-CM

## 2020-07-30 DIAGNOSIS — J44.9 CHRONIC OBSTRUCTIVE PULMONARY DISEASE, UNSPECIFIED COPD TYPE (HCC): ICD-10-CM

## 2020-07-30 DIAGNOSIS — H25.13 AGE-RELATED NUCLEAR CATARACT OF BOTH EYES: ICD-10-CM

## 2020-07-30 DIAGNOSIS — Z86.711 HISTORY OF PULMONARY EMBOLISM: ICD-10-CM

## 2020-07-30 DIAGNOSIS — E11.9 TYPE 2 DIABETES MELLITUS WITHOUT COMPLICATION, WITHOUT LONG-TERM CURRENT USE OF INSULIN (HCC): ICD-10-CM

## 2020-07-30 DIAGNOSIS — E66.01 SEVERE OBESITY (BMI 35.0-39.9) WITH COMORBIDITY (HCC): Chronic | ICD-10-CM

## 2020-07-30 PROCEDURE — 99397 PER PM REEVAL EST PAT 65+ YR: CPT | Performed by: FAMILY MEDICINE

## 2020-07-30 PROCEDURE — 3008F BODY MASS INDEX DOCD: CPT | Performed by: FAMILY MEDICINE

## 2020-07-30 PROCEDURE — 96160 PT-FOCUSED HLTH RISK ASSMT: CPT | Performed by: FAMILY MEDICINE

## 2020-07-30 PROCEDURE — G0439 PPPS, SUBSEQ VISIT: HCPCS | Performed by: FAMILY MEDICINE

## 2020-07-30 PROCEDURE — 3079F DIAST BP 80-89 MM HG: CPT | Performed by: FAMILY MEDICINE

## 2020-07-30 PROCEDURE — 3075F SYST BP GE 130 - 139MM HG: CPT | Performed by: FAMILY MEDICINE

## 2020-07-30 NOTE — PATIENT INSTRUCTIONS
Kendell Morales's SCREENING SCHEDULE   Tests on this list are recommended by your physician but may not be covered, or covered at this frequency, by your insurer. Please check with your insurance carrier before scheduling to verify coverage.    PREVENTATIV Screen   Covered every 10 years- more often if abnormal Colonoscopy due on 07/25/2020 Update [facility] if applicable    Flex Sigmoidoscopy Screen  Covered every 5 years No results found for this or any previous visit. No flowsheet data found. performed in visit on 10/26/16   • PNEUMOCOCCAL VACC, 13 EARNESTINE IM    Please get once after your 65th birthday    Pneumococcal 23 (Pneumovax)  Covered Once after 65 Orders placed or performed in visit on 10/10/18   • PNEUMOCOCCAL IMM (PNEUMOVAX)    Please get

## 2020-07-30 NOTE — PROGRESS NOTES
HPI:   Shady Scales is a 78year old female who presents for a Medicare Subsequent Annual Wellness visit (Pt already had Initial Annual Wellness). 78 yr old female who presents for physical. Gets more exercise.   Got elliptical. Does it for 15 lidya tobacco in the past but quit greater than 12 months ago.   Social History    Tobacco Use      Smoking status: Former Smoker        Years: 30.00        Quit date: 1980        Years since quittin.6      Smokeless tobacco: Never Used         CAGE Alco Diabetes ([facility] [address] Diverticulosis, Esophageal reflux, Extrinsic asthma, unspecified, History of blood transfusion, Polyp of colon (07/2017), Pulmonary embolism ([facility] [address] and Sleep apnea.     She  has a past surgical history that includes cholecystectomy; hysterec calculated from the following:    Height as of this encounter: 5' 3.5\" (1.613 m). Weight as of this encounter: 228 lb 12.8 oz (103.8 kg).     Medicare Hearing Assessment  (Required for AWV/SWV)    Hearing Screening    Screening Method:  Questionnaire  I good landmarks noted. Nares patent. Oral mucous membrane moist.  Normal lips, teeth, and gums. Oropharynx normal.  Neck supple. Good ROM. No LAD.   Thyroid normal.  CV:  Regular rate and rhythm; no murmurs  Lungs:  Clear to ausculation; good aeration Chronic obstructive pulmonary disease, unspecified copd type (hcc)    Controlled. Severe obesity (bmi 35.0-39. 9) with comorbidity (hcc)    Encouraged exercise and healthy diet    Floaters in visual field, bilateral    Referral to Ophtho    Age-relat FHx Glaucoma, AA>50, > 65 Data entered on: 2/27/2020   Last Dilated Eye Exam 1/6/2020       Bone Density Screening      Dexascan Every two years Last Dexa Scan:   XR DEXA BONE DENSITOMETRY (CPT=77080) 05/01/2017    No flowsheet data found.     Pap a found.    Creat/alb ratio  Annually Malb/Cre Calc (no units)   Date Value   04/23/2018 1.5        LDL  Annually LDL-CHOLESTEROL (mg/dL (calc))   Date Value   07/08/2019 86    No flowsheet data found.      Dilated Eye exam  Annually Data entered on: 2/27/202

## 2020-07-30 NOTE — TELEPHONE ENCOUNTER
Pt seen by pcp and expressed numerous concerns/signficant anxiety regarding covid and ucpoming procedure. Pt contacted by NP to address concerns. lmtcb.

## 2020-07-31 LAB
ALBUMIN/GLOBULIN RATIO: 1.5 (CALC) (ref 1–2.5)
ALBUMIN: 4.4 G/DL (ref 3.6–5.1)
ALKALINE PHOSPHATASE: 101 U/L (ref 37–153)
ALT: 21 U/L (ref 6–29)
AST: 19 U/L (ref 10–35)
BILIRUBIN, TOTAL: 0.4 MG/DL (ref 0.2–1.2)
BUN: 12 MG/DL (ref 7–25)
CALCIUM: 9.9 MG/DL (ref 8.6–10.4)
CARBON DIOXIDE: 28 MMOL/L (ref 20–32)
CHLORIDE: 104 MMOL/L (ref 98–110)
CHOL/HDLC RATIO: 3.1 (CALC)
CHOLESTEROL, TOTAL: 153 MG/DL
CREATININE, RANDOM URINE: 86 MG/DL (ref 20–275)
CREATININE: 0.8 MG/DL (ref 0.6–0.93)
EGFR IF AFRICN AM: 87 ML/MIN/1.73M2
EGFR IF NONAFRICN AM: 75 ML/MIN/1.73M2
GLOBULIN: 3 G/DL (CALC) (ref 1.9–3.7)
GLUCOSE: 134 MG/DL (ref 65–99)
HDL CHOLESTEROL: 50 MG/DL
HEMATOCRIT: 41.9 % (ref 35–45)
HEMOGLOBIN A1C: 7.6 % OF TOTAL HGB
HEMOGLOBIN: 14.1 G/DL (ref 11.7–15.5)
LDL-CHOLESTEROL: 86 MG/DL (CALC)
MCH: 27.1 PG (ref 27–33)
MCHC: 33.7 G/DL (ref 32–36)
MCV: 80.6 FL (ref 80–100)
MICROALBUMIN/CREATININE RATIO, RANDOM URINE: 8 MCG/MG CREAT
MICROALBUMIN: 0.7 MG/DL
MPV: 10.2 FL (ref 7.5–12.5)
NON-HDL CHOLESTEROL: 103 MG/DL (CALC)
PLATELET COUNT: 301 THOUSAND/UL (ref 140–400)
POTASSIUM: 4 MMOL/L (ref 3.5–5.3)
PROTEIN, TOTAL: 7.4 G/DL (ref 6.1–8.1)
RDW: 15.3 % (ref 11–15)
RED BLOOD CELL COUNT: 5.2 MILLION/UL (ref 3.8–5.1)
SODIUM: 140 MMOL/L (ref 135–146)
TRIGLYCERIDES: 82 MG/DL
TSH: 2.47 MIU/L (ref 0.4–4.5)
WHITE BLOOD CELL COUNT: 8.6 THOUSAND/UL (ref 3.8–10.8)

## 2020-08-03 RX ORDER — MONTELUKAST SODIUM 10 MG/1
10 TABLET ORAL NIGHTLY
Qty: 90 TABLET | Refills: 0 | Status: SHIPPED | OUTPATIENT
Start: 2020-08-03 | End: 2020-10-22

## 2020-08-03 RX ORDER — OMEPRAZOLE 20 MG/1
20 CAPSULE, DELAYED RELEASE ORAL EVERY MORNING
Qty: 90 CAPSULE | Refills: 0 | Status: SHIPPED | OUTPATIENT
Start: 2020-08-03 | End: 2020-10-22

## 2020-08-03 RX ORDER — NYSTATIN 100000 U/G
1 CREAM TOPICAL 2 TIMES DAILY
Qty: 30 G | Refills: 3 | Status: SHIPPED | OUTPATIENT
Start: 2020-08-03 | End: 2020-10-20

## 2020-08-06 NOTE — TELEPHONE ENCOUNTER
np reached out out to pt. Discussed her concerns regarding covid and what to expect prior her upcoming procedure. Patient will call if additional questions/concerns.

## 2020-09-21 ENCOUNTER — APPOINTMENT (OUTPATIENT)
Dept: LAB | Age: 70
End: 2020-09-21
Attending: INTERNAL MEDICINE
Payer: MEDICARE

## 2020-09-21 DIAGNOSIS — Z01.818 PRE-OP TESTING: ICD-10-CM

## 2020-09-22 LAB — SARS-COV-2 RNA RESP QL NAA+PROBE: NOT DETECTED

## 2020-09-23 ENCOUNTER — ANESTHESIA (OUTPATIENT)
Dept: ENDOSCOPY | Facility: HOSPITAL | Age: 70
End: 2020-09-23
Payer: MEDICARE

## 2020-09-23 ENCOUNTER — HOSPITAL ENCOUNTER (OUTPATIENT)
Facility: HOSPITAL | Age: 70
Setting detail: HOSPITAL OUTPATIENT SURGERY
Discharge: HOME | End: 2020-09-23
Attending: INTERNAL MEDICINE | Admitting: INTERNAL MEDICINE
Payer: MEDICARE

## 2020-09-23 ENCOUNTER — ANESTHESIA EVENT (OUTPATIENT)
Dept: ENDOSCOPY | Facility: HOSPITAL | Age: 70
End: 2020-09-23
Payer: MEDICARE

## 2020-09-23 VITALS
WEIGHT: 218 LBS | DIASTOLIC BLOOD PRESSURE: 64 MMHG | RESPIRATION RATE: 13 BRPM | HEIGHT: 64 IN | SYSTOLIC BLOOD PRESSURE: 121 MMHG | HEART RATE: 69 BPM | OXYGEN SATURATION: 96 % | BODY MASS INDEX: 37.22 KG/M2 | TEMPERATURE: 98 F

## 2020-09-23 DIAGNOSIS — Z86.010 HX OF COLONIC POLYPS: ICD-10-CM

## 2020-09-23 DIAGNOSIS — Z01.818 PRE-OP TESTING: Primary | ICD-10-CM

## 2020-09-23 LAB — GLUCOSE BLDC GLUCOMTR-MCNC: 140 MG/DL (ref 70–99)

## 2020-09-23 PROCEDURE — 0DJD8ZZ INSPECTION OF LOWER INTESTINAL TRACT, VIA NATURAL OR ARTIFICIAL OPENING ENDOSCOPIC: ICD-10-PCS | Performed by: INTERNAL MEDICINE

## 2020-09-23 PROCEDURE — G0105 COLORECTAL SCRN; HI RISK IND: HCPCS | Performed by: INTERNAL MEDICINE

## 2020-09-23 RX ORDER — SODIUM CHLORIDE, SODIUM LACTATE, POTASSIUM CHLORIDE, CALCIUM CHLORIDE 600; 310; 30; 20 MG/100ML; MG/100ML; MG/100ML; MG/100ML
INJECTION, SOLUTION INTRAVENOUS CONTINUOUS
Status: DISCONTINUED | OUTPATIENT
Start: 2020-09-23 | End: 2020-09-23

## 2020-09-23 RX ORDER — LIDOCAINE HYDROCHLORIDE 10 MG/ML
INJECTION, SOLUTION EPIDURAL; INFILTRATION; INTRACAUDAL; PERINEURAL AS NEEDED
Status: DISCONTINUED | OUTPATIENT
Start: 2020-09-23 | End: 2020-09-23 | Stop reason: SURG

## 2020-09-23 RX ORDER — DEXTROSE MONOHYDRATE 25 G/50ML
50 INJECTION, SOLUTION INTRAVENOUS
Status: DISCONTINUED | OUTPATIENT
Start: 2020-09-23 | End: 2020-09-23

## 2020-09-23 RX ORDER — NALOXONE HYDROCHLORIDE 0.4 MG/ML
80 INJECTION, SOLUTION INTRAMUSCULAR; INTRAVENOUS; SUBCUTANEOUS AS NEEDED
Status: DISCONTINUED | OUTPATIENT
Start: 2020-09-23 | End: 2020-09-23

## 2020-09-23 RX ADMIN — SODIUM CHLORIDE, SODIUM LACTATE, POTASSIUM CHLORIDE, CALCIUM CHLORIDE: 600; 310; 30; 20 INJECTION, SOLUTION INTRAVENOUS at 08:57:00

## 2020-09-23 RX ADMIN — SODIUM CHLORIDE, SODIUM LACTATE, POTASSIUM CHLORIDE, CALCIUM CHLORIDE: 600; 310; 30; 20 INJECTION, SOLUTION INTRAVENOUS at 09:23:00

## 2020-09-23 RX ADMIN — LIDOCAINE HYDROCHLORIDE 20 MG: 10 INJECTION, SOLUTION EPIDURAL; INFILTRATION; INTRACAUDAL; PERINEURAL at 08:59:00

## 2020-09-23 NOTE — H&P
History & Physical Examination    Patient Name: Carlos Alberto Rajput  MRN: [id number]  CSN: [id number]  YOB: 1950    Diagnosis: screening for colon cancer      •  metFORMIN HCl 500 MG Oral Tab, Take 1 tablet (500 mg total) by mouth daily with breakf once daily, Disp: 100 each, Rfl: 0    •  Lancets Misc. Does not apply Misc, To check blood sugar once daily, Disp: 100 each, Rfl: 3    •  PEG 3350-KCl-Na Bicarb-NaCl (TRILYTE) 420 g Oral Recon Soln, Take prep as directed by gastro office.  May substitute wi quittin.7      Smokeless tobacco: Never Used    Alcohol use:  Yes      Alcohol/week: 1.0 standard drinks      Types: 1 Shots of liquor per week      Comment: \"socially\"      SYSTEM Check if Review is Normal Check if Physical Exam is Normal If not nor

## 2020-09-23 NOTE — ANESTHESIA POSTPROCEDURE EVALUATION
Patient: Adry Pisano    Procedure Summary     Date: 09/23/20 Room / Location: [address] [place] [address] ENDOSCOPY 01 / [address] [place] [address] ENDOSCOPY    Anesthesia Start: 2224 Anesthesia Stop: 3519    Procedure: COLONOSCOPY (N/A ) Diagnosis:       Hx of colonic polyps      (diverticulosis, h

## 2020-09-23 NOTE — ANESTHESIA PREPROCEDURE EVALUATION
Anesthesia PreOp Note    HPI:     Vinita Rubin is a 69year old adult who presents for preoperative consultation requested by: William Tamayo MD    Date of Surgery: 9/23/2020    Procedure(s):  COLONOSCOPY  Indication: Hx of colonic polyps    Relevant P morning., Disp: 90 capsule, Rfl: 0    •  Montelukast Sodium 10 MG Oral Tab, Take 1 tablet (10 mg total) by mouth nightly. At Bedtime, Disp: 90 tablet, Rfl: 0    •  nystatin 147206 UNIT/GM External Cream, Apply 1 Application topically 2 (two) times daily. , Blood (ONETOUCH ULTRA BLUE) In Vitro Strip, place 1 by [employer]. (Non-Drug; Combo Route) route 3 times every day with glucose monitor, Disp: , Rfl:       No current Epic-ordered facility-administered medications on file.    No current [facility]-ordered outpatient med current or ex partner: Not on file        Emotionally abused: Not on file        Physically abused: Not on file        Forced sexual activity: Not on file    Other Topics      Concerns:        Not on file    Social History Narrative      Not on file      A ability. The patient desires the anesthetic management as planned.   Vinicius Santillan  9/23/2020 8:13 AM

## 2020-09-23 NOTE — OPERATIVE REPORT
COLONOSCOPY REPORT    Montez Doyle     1950 Age 69year old   PCP Nancy Lara DO Endoscopist Tony Falcon MD     Date of procedure: 20    Procedure: Colonoscopy     Pre-operative diagnosis: Screening    Post-operative diagnosis: inflammation. 6. HANNAH: normal rectal tone, no masses palpated. Impression:   · No polyps noted. · Mild sigmoid diverticulosis, internal hemorrhoids.      Recommend:  · Repeat CLN in 5 years duet hx of polyps if patient interested in continuing scree

## 2020-10-01 ENCOUNTER — NURSE TRIAGE (OUTPATIENT)
Dept: FAMILY MEDICINE CLINIC | Facility: CLINIC | Age: 70
End: 2020-10-01

## 2020-10-01 ENCOUNTER — OFFICE VISIT (OUTPATIENT)
Dept: FAMILY MEDICINE CLINIC | Facility: CLINIC | Age: 70
End: 2020-10-01
Payer: MEDICARE

## 2020-10-01 VITALS
HEIGHT: 64 IN | BODY MASS INDEX: 39.27 KG/M2 | DIASTOLIC BLOOD PRESSURE: 62 MMHG | WEIGHT: 230 LBS | HEART RATE: 68 BPM | TEMPERATURE: 97 F | SYSTOLIC BLOOD PRESSURE: 128 MMHG

## 2020-10-01 DIAGNOSIS — L03.039 INFECTION OF TOENAIL: Primary | ICD-10-CM

## 2020-10-01 PROCEDURE — 3074F SYST BP LT 130 MM HG: CPT | Performed by: FAMILY MEDICINE

## 2020-10-01 PROCEDURE — 99213 OFFICE O/P EST LOW 20 MIN: CPT | Performed by: FAMILY MEDICINE

## 2020-10-01 PROCEDURE — 3008F BODY MASS INDEX DOCD: CPT | Performed by: FAMILY MEDICINE

## 2020-10-01 PROCEDURE — 3078F DIAST BP <80 MM HG: CPT | Performed by: FAMILY MEDICINE

## 2020-10-01 NOTE — PROGRESS NOTES
Carlos Alberto Rajput is a 69year old adult. Patient presents with: Toe Pain: injury on left big toe x 1wk. States clear liquid comes out. no swelling, fevers/chills, reddness      HPI:     Patient is a 76-year-old female.   Presents today complaining of past o (four) hours as needed for Wheezing. Use as directed 1 Box 3   • Blood Glucose Monitoring Suppl (ONETOUCH ULTRA SYSTEM) W/DEVICE Does not apply Kit To use daily 1 kit 0   • Cyanocobalamin (VITAMIN B-12 CR OR) Take  by mouth.      • Glucose Blood (ONETOUCH U wheezing  CARDIOVASCULAR: denies chest pain on exertion, palpitations, swelling in feet  GI: denies abdominal pain and denies heartburn, nausea or vomiting  : No Pain on urination, change in the color of urine, discharge, urinating frequently  MUS: No ba

## 2020-10-01 NOTE — TELEPHONE ENCOUNTER
Action Requested: Summary for Provider     []  Critical Lab, Recommendations Needed  [] Need Additional Advice  []   FYI    []   Need Orders  [] Need Medications Sent to Pharmacy  []  Other     SUMMARY: per patient she was giving herself a pedicure and \

## 2020-10-02 ENCOUNTER — TELEPHONE (OUTPATIENT)
Dept: FAMILY MEDICINE CLINIC | Facility: CLINIC | Age: 70
End: 2020-10-02

## 2020-10-02 DIAGNOSIS — M79.673 ACUTE FOOT PAIN, UNSPECIFIED LATERALITY: Primary | ICD-10-CM

## 2020-10-02 NOTE — TELEPHONE ENCOUNTER
Patient currently at [facility] for her appointment. They are requesting a referral to see them. Noticed referral has been entered, however, it is still pending authorization through patient's insurance. They will not see patient.  Patient askin

## 2020-10-05 NOTE — TELEPHONE ENCOUNTER
Dr Tressa Espinoza: Lubna Mario  Patient said podiatry cleaned and cut nail. Applied Betadine; patient is to soak toe. Per podiatrist, didn't have infection. She will be applied antibiotic ointment.

## 2020-10-12 ENCOUNTER — TELEPHONE (OUTPATIENT)
Dept: FAMILY MEDICINE CLINIC | Facility: CLINIC | Age: 70
End: 2020-10-12

## 2020-10-12 NOTE — TELEPHONE ENCOUNTER
Dr.Weiler I received a call from pt that she heard on the news that metformin is on recall as it has something that can cause cancer. I asked pt if she had received a call from her pharmacy. Pt stated that she did not.  I tried to explain to pt that sometim

## 2020-10-15 NOTE — TELEPHONE ENCOUNTER
Spoke with patient. She had checked with pharmacy and they do not carry the brand they recalled. She will continue Metformin.

## 2020-10-20 RX ORDER — NYSTATIN 100000 U/G
1 CREAM TOPICAL 2 TIMES DAILY
Qty: 30 G | Refills: 3 | Status: SHIPPED | OUTPATIENT
Start: 2020-10-20 | End: 2021-03-23

## 2020-10-23 RX ORDER — OMEPRAZOLE 20 MG/1
20 CAPSULE, DELAYED RELEASE ORAL EVERY MORNING
Qty: 90 CAPSULE | Refills: 0 | Status: SHIPPED | OUTPATIENT
Start: 2020-10-23 | End: 2021-01-25

## 2020-10-23 RX ORDER — MONTELUKAST SODIUM 10 MG/1
10 TABLET ORAL NIGHTLY
Qty: 90 TABLET | Refills: 0 | Status: SHIPPED | OUTPATIENT
Start: 2020-10-23 | End: 2021-01-25

## 2020-11-11 ENCOUNTER — NURSE TRIAGE (OUTPATIENT)
Dept: FAMILY MEDICINE CLINIC | Facility: CLINIC | Age: 70
End: 2020-11-11

## 2020-11-11 NOTE — TELEPHONE ENCOUNTER
Need to triage the patient. Itaro message sent to call us back.   Last ordered on 7/17/14    Requested Medication Additions Start Date Reported By  Comments   Meclizine 25 mg  Silvana Morales Need a refill on Meclizine 25 mg  it's not listed in me

## 2020-11-11 NOTE — TELEPHONE ENCOUNTER
Action Requested: Summary for Provider     []  Critical Lab, Recommendations Needed  [] Need Additional Advice  []   FYI    []   Need Orders  [] Need Medications Sent to Pharmacy  []  Other     SUMMARY: states her vertigo has been flaring up for a few days

## 2020-11-19 ENCOUNTER — TELEMEDICINE (OUTPATIENT)
Dept: INTERNAL MEDICINE CLINIC | Facility: CLINIC | Age: 70
End: 2020-11-19
Payer: MEDICARE

## 2020-11-19 DIAGNOSIS — J01.90 ACUTE NON-RECURRENT SINUSITIS, UNSPECIFIED LOCATION: Primary | ICD-10-CM

## 2020-11-19 DIAGNOSIS — E11.9 TYPE 2 DIABETES MELLITUS WITHOUT COMPLICATION, WITHOUT LONG-TERM CURRENT USE OF INSULIN (HCC): ICD-10-CM

## 2020-11-19 PROCEDURE — 99203 OFFICE O/P NEW LOW 30 MIN: CPT | Performed by: PHYSICIAN ASSISTANT

## 2020-11-19 RX ORDER — AZITHROMYCIN 250 MG/1
TABLET, FILM COATED ORAL
Qty: 6 TABLET | Refills: 0 | Status: SHIPPED | OUTPATIENT
Start: 2020-11-19 | End: 2020-11-24

## 2020-11-19 RX ORDER — MECLIZINE HYDROCHLORIDE 25 MG/1
25 TABLET ORAL 2 TIMES DAILY PRN
Qty: 60 TABLET | Refills: 0 | Status: SHIPPED | OUTPATIENT
Start: 2020-11-19

## 2020-11-19 NOTE — PROGRESS NOTES
This is a telemedicine visit with live, interactive video and audio. Patient understands and accepts financial responsibility for any deductible, co-insurance and/or co-pays associated with this service.     SUBJECTIVE  Patient presents with vertigo fla Z-ANJALI) 250 MG Oral Tab Take 2 tablets (500 mg total) by mouth daily for 1 day, THEN 1 tablet (250 mg total) daily for 4 days. 6 tablet 0   • Meclizine HCl 25 MG Oral Tab Take 1 tablet (25 mg total) by mouth 2 (two) times daily as needed.  60 tablet 0   • me route 3 times every day with glucose monitor     • Multiple Vitamins-Minerals (CENTRUM SILVER) Oral Tab Take  by mouth.  Take one tablet by mouth daily         OBJECTIVE  Physical Exam:   alert, appears stated age and cooperative, Normocephalic, without obv

## 2020-11-19 NOTE — TELEPHONE ENCOUNTER
Patient states she continues to have sinus pain and pressure along with elevated blood sugars in the 200's. Scheduled virtual appt today at 11:00 am with SOUMYA Damon.

## 2020-12-09 ENCOUNTER — TELEMEDICINE (OUTPATIENT)
Dept: FAMILY MEDICINE CLINIC | Facility: CLINIC | Age: 70
End: 2020-12-09
Payer: MEDICARE

## 2020-12-09 ENCOUNTER — TELEPHONE (OUTPATIENT)
Dept: FAMILY MEDICINE CLINIC | Facility: CLINIC | Age: 70
End: 2020-12-09

## 2020-12-09 DIAGNOSIS — R51.9 NONINTRACTABLE EPISODIC HEADACHE, UNSPECIFIED HEADACHE TYPE: Primary | ICD-10-CM

## 2020-12-09 DIAGNOSIS — H93.13 TINNITUS AURIUM, BILATERAL: ICD-10-CM

## 2020-12-09 PROCEDURE — 99213 OFFICE O/P EST LOW 20 MIN: CPT | Performed by: FAMILY MEDICINE

## 2020-12-09 RX ORDER — LEVOFLOXACIN 500 MG/1
500 TABLET, FILM COATED ORAL DAILY
Qty: 7 TABLET | Refills: 0 | Status: SHIPPED | OUTPATIENT
Start: 2020-12-09 | End: 2020-12-16

## 2020-12-09 NOTE — PROGRESS NOTES
HPI:    Patient ID: Kulwinder Porter is a 69year old adult. This visit is conducted using Telemedicine with live, interactive video and audio.     Patient has been referred to the [facility] website at [email or web address] to review the yearly Consent to Tr MG Oral Tab Take 81 mg by mouth daily. • albuterol sulfate (2.5 MG/3ML) 0.083% Inhalation Nebu Soln Take 3 mL (2.5 mg total) by nebulization every 4 (four) hours as needed for Wheezing.  Use as directed 1 Box 3   • Blood Glucose Monitoring Suppl (ONETOU tablet (500 mg total) by mouth daily for 7 days.        Imaging & Referrals:  None       #[id number]

## 2020-12-09 NOTE — TELEPHONE ENCOUNTER
Virtual appointment made for today  12/9/20    The patient seen Coleman Cullen PA-C on 11/19/20 and diagnosed with a sinus infection and the patient's sugar was in the 200's. Placed on an antibiotic and Metformin was changed to BID and to take Mucinex.

## 2020-12-13 RX ORDER — FLUTICASONE PROPIONATE AND SALMETEROL 250; 50 UG/1; UG/1
POWDER RESPIRATORY (INHALATION)
Qty: 1 PACKAGE | Refills: 3 | Status: SHIPPED | OUTPATIENT
Start: 2020-12-13

## 2021-01-25 RX ORDER — MONTELUKAST SODIUM 10 MG/1
10 TABLET ORAL NIGHTLY
Qty: 90 TABLET | Refills: 0 | Status: SHIPPED | OUTPATIENT
Start: 2021-01-25 | End: 2021-05-20

## 2021-01-26 RX ORDER — OMEPRAZOLE 20 MG/1
20 CAPSULE, DELAYED RELEASE ORAL EVERY MORNING
Qty: 90 CAPSULE | Refills: 0 | Status: SHIPPED | OUTPATIENT
Start: 2021-01-26 | End: 2021-03-23

## 2021-01-27 DIAGNOSIS — Z23 NEED FOR VACCINATION: ICD-10-CM

## 2021-03-23 RX ORDER — OMEPRAZOLE 20 MG/1
CAPSULE, DELAYED RELEASE ORAL
Qty: 90 CAPSULE | Refills: 0 | Status: SHIPPED | OUTPATIENT
Start: 2021-03-23 | End: 2021-07-09

## 2021-03-25 RX ORDER — NYSTATIN 100000 U/G
1 CREAM TOPICAL 2 TIMES DAILY
Qty: 30 G | Refills: 3 | Status: SHIPPED | OUTPATIENT
Start: 2021-03-25 | End: 2021-05-19

## 2021-04-02 ENCOUNTER — TELEPHONE (OUTPATIENT)
Dept: FAMILY MEDICINE CLINIC | Facility: CLINIC | Age: 71
End: 2021-04-02

## 2021-04-02 DIAGNOSIS — E11.9 TYPE 2 DIABETES MELLITUS WITHOUT COMPLICATION, WITHOUT LONG-TERM CURRENT USE OF INSULIN (HCC): Primary | ICD-10-CM

## 2021-04-02 NOTE — TELEPHONE ENCOUNTER
Dr. Christian Ritter, please advise on orders. Last labs 7/2020. Thanks.     Future Appointments   Date Time Provider Ирина Tam   5/19/2021  1:00 PM DO Helio Manzo

## 2021-04-02 NOTE — TELEPHONE ENCOUNTER
Per patient she received a text that she needs to go and see her Ophthalmology doctor for her yearly eye exam and she needs a referral.  No appointment yet.

## 2021-04-02 NOTE — TELEPHONE ENCOUNTER
Per patient she received a text that she needs to have an A1C order and patient has an appointment on 05/19/2021 for Medicare physical.

## 2021-04-05 NOTE — TELEPHONE ENCOUNTER
We can plan to do everything that day unless she would like to do labs ahead of time and discuss results then    Looks like she needs [facility]?  It would be great if she can do a nurse visit

## 2021-04-05 NOTE — TELEPHONE ENCOUNTER
No name on the referral.  Can you find out who she needs the referral for? She has a retina specialist also.   Please generate referral and diagnosis

## 2021-04-06 NOTE — TELEPHONE ENCOUNTER
Hi Dr. Edil Jewell does have an authorized referral on file to see Dr. Jesse Blackwood for six visits from the dates of 12/20 - 6/21. If she has not seen him six times this referral is still good. I can fax it over to them.     Please advise if this is

## 2021-04-06 NOTE — TELEPHONE ENCOUNTER
Referral to Dr. Hamilton Rowell done. Jeramie Esteban is ENT unless there is an Jeramie Esteban for Ophtho?

## 2021-04-08 RX ORDER — ALBUTEROL SULFATE 90 UG/1
2 AEROSOL, METERED RESPIRATORY (INHALATION) EVERY 4 HOURS PRN
Qty: 3 INHALER | Refills: 1 | Status: SHIPPED | OUTPATIENT
Start: 2021-04-08 | End: 2021-09-03

## 2021-04-13 NOTE — TELEPHONE ENCOUNTER
[email or web address] message viewed by patient.      From   [facility] To   Rosy Wing and Delivered   4/12/2021  9:18 AM   Last Read in [employer]   4/12/2021  9:19 AM by Luis Mckeon

## 2021-04-30 ENCOUNTER — NURSE TRIAGE (OUTPATIENT)
Dept: FAMILY MEDICINE CLINIC | Facility: CLINIC | Age: 71
End: 2021-04-30

## 2021-04-30 ENCOUNTER — TELEMEDICINE (OUTPATIENT)
Dept: FAMILY MEDICINE CLINIC | Facility: CLINIC | Age: 71
End: 2021-04-30

## 2021-04-30 DIAGNOSIS — J30.0 VASOMOTOR RHINITIS: Primary | ICD-10-CM

## 2021-04-30 PROCEDURE — 99213 OFFICE O/P EST LOW 20 MIN: CPT | Performed by: PHYSICIAN ASSISTANT

## 2021-04-30 RX ORDER — LEVOCETIRIZINE DIHYDROCHLORIDE 5 MG/1
5 TABLET, FILM COATED ORAL EVERY EVENING
Qty: 90 TABLET | Refills: 1 | Status: SHIPPED | OUTPATIENT
Start: 2021-04-30

## 2021-04-30 NOTE — TELEPHONE ENCOUNTER
Action Requested: Summary for Provider     []  Critical Lab, Recommendations Needed  [] Need Additional Advice  []   FYI    []   Need Orders  [] Need Medications Sent to Pharmacy  []  Other     SUMMARY: Pt c/o sinus pressure/nasal congestion x 4 days.  She

## 2021-05-03 NOTE — PROGRESS NOTES
HPI: HPI  Patient complaints of sneezing and runny nose and headache for the past 4 days. Headache resolved after she took Tylenol 500 mg last night.   Patient denies of chest pain, SOB, N/V/C/D, fever, dizziness, syncope, abdominal pain, body ache, fat Take 3 mL (2.5 mg total) by nebulization every 4 (four) hours as needed for Wheezing.  Use as directed 1 Box 3   • Blood Glucose Monitoring Suppl (ONETOUCH ULTRA SYSTEM) W/DEVICE Does not apply Kit To use daily 1 kit 0   • Cyanocobalamin (VITAMIN B-12 CR OR • Hysterectomy         Family History:     Family History   Problem Relation Age of Onset   • Diabetes Mother 79        CAUSE OF DEATH   • Other (DVT) Son    • Diabetes Sister    • Cancer Sister 48        RENAL   • Hypertension Brother    • Cancer Daught     Physically Abused:       Sexually Abused:     Review of Systems:   Review of Systems   Constitutional: Negative for activity change, chills, fatigue and fever. HENT: Positive for rhinorrhea and sneezing.  Negative for congestion, ear discharge, ear

## 2021-05-19 ENCOUNTER — OFFICE VISIT (OUTPATIENT)
Dept: FAMILY MEDICINE CLINIC | Facility: CLINIC | Age: 71
End: 2021-05-19
Payer: MEDICARE

## 2021-05-19 VITALS
RESPIRATION RATE: 18 BRPM | HEIGHT: 63.78 IN | DIASTOLIC BLOOD PRESSURE: 83 MMHG | BODY MASS INDEX: 35.26 KG/M2 | WEIGHT: 204 LBS | SYSTOLIC BLOOD PRESSURE: 139 MMHG | TEMPERATURE: 98 F | HEART RATE: 73 BPM

## 2021-05-19 DIAGNOSIS — E66.01 SEVERE OBESITY (BMI 35.0-39.9) WITH COMORBIDITY (HCC): Chronic | ICD-10-CM

## 2021-05-19 DIAGNOSIS — J44.9 ASTHMA WITH COPD (CHRONIC OBSTRUCTIVE PULMONARY DISEASE) (HCC): ICD-10-CM

## 2021-05-19 DIAGNOSIS — K57.90 DIVERTICULOSIS: ICD-10-CM

## 2021-05-19 DIAGNOSIS — Z00.00 ENCOUNTER FOR ANNUAL HEALTH EXAMINATION: Primary | ICD-10-CM

## 2021-05-19 DIAGNOSIS — H25.13 AGE-RELATED NUCLEAR CATARACT OF BOTH EYES: ICD-10-CM

## 2021-05-19 DIAGNOSIS — M25.521 RIGHT ELBOW PAIN: ICD-10-CM

## 2021-05-19 DIAGNOSIS — H43.393 FLOATERS IN VISUAL FIELD, BILATERAL: ICD-10-CM

## 2021-05-19 DIAGNOSIS — E28.39 ESTROGEN DEFICIENCY: ICD-10-CM

## 2021-05-19 DIAGNOSIS — J44.9 CHRONIC OBSTRUCTIVE PULMONARY DISEASE, UNSPECIFIED COPD TYPE (HCC): ICD-10-CM

## 2021-05-19 DIAGNOSIS — Z86.711 HISTORY OF PULMONARY EMBOLISM: ICD-10-CM

## 2021-05-19 DIAGNOSIS — G47.33 OBSTRUCTIVE SLEEP APNEA: ICD-10-CM

## 2021-05-19 DIAGNOSIS — E11.9 TYPE 2 DIABETES MELLITUS WITHOUT COMPLICATION, WITHOUT LONG-TERM CURRENT USE OF INSULIN (HCC): ICD-10-CM

## 2021-05-19 PROCEDURE — 3008F BODY MASS INDEX DOCD: CPT | Performed by: FAMILY MEDICINE

## 2021-05-19 PROCEDURE — 3044F HG A1C LEVEL LT 7.0%: CPT | Performed by: FAMILY MEDICINE

## 2021-05-19 PROCEDURE — 3079F DIAST BP 80-89 MM HG: CPT | Performed by: FAMILY MEDICINE

## 2021-05-19 PROCEDURE — 96160 PT-FOCUSED HLTH RISK ASSMT: CPT | Performed by: FAMILY MEDICINE

## 2021-05-19 PROCEDURE — 99397 PER PM REEVAL EST PAT 65+ YR: CPT | Performed by: FAMILY MEDICINE

## 2021-05-19 PROCEDURE — 3075F SYST BP GE 130 - 139MM HG: CPT | Performed by: FAMILY MEDICINE

## 2021-05-19 PROCEDURE — 3061F NEG MICROALBUMINURIA REV: CPT | Performed by: FAMILY MEDICINE

## 2021-05-19 PROCEDURE — G0439 PPPS, SUBSEQ VISIT: HCPCS | Performed by: FAMILY MEDICINE

## 2021-05-19 RX ORDER — NYSTATIN 100000 U/G
1 CREAM TOPICAL 2 TIMES DAILY
Qty: 30 G | Refills: 3 | Status: SHIPPED | OUTPATIENT
Start: 2021-05-19 | End: 2021-07-29

## 2021-05-19 NOTE — PROGRESS NOTES
HPI:   Shady Scales is a 69year old adult who presents for a Medicare Subsequent Annual Wellness visit (Pt already had Initial Annual Wellness). HPI:  69 yr old female who presents for physical. . On Weight Watchers and lost 26 pounds!  Evander Harada Advance care planning including the explanation and discussion of advance directives standard forms performed Face to Face with patient and Family/surrogate (if present), and forms available to patient in AVS       Vinita Rubin smoked tobacco in the pa Glucose Blood In Vitro Strip, Check blood sugar once daily  nystatin 910956 UNIT/GM External Cream, Apply 1 Application topically 2 (two) times daily.        MEDICAL INFORMATION:   Scarlet Mondragon  has a past medical history of Asthma, Chest pain, Diabetes bleeding and easy bruising  Integumentary:  Negative for pruritus and rash  Musculoskeletal:  Right elbow pain  Neurological:  Negative for gait disturbance  Psychiatric:  Negative for inappropriate interaction and psychiatric symptoms      EXAM:   / OTHER RELEVANT CHRONIC CONDITIONS:   Bilyl Marte is a 69year old adult who presents for a Medicare Assessment. PLAN SUMMARY:       Encounter for annual health examination  -Congratulated on weight loss!!!  Fasting labs done    Right elbow pain  -Lik current health state?: Fair  How do you maintain positive mental well-being?: Social Interaction;Games; Visiting Friends      This section provided for quick review of chart, separate sheet to patient  PREVENTATIVE SERVICES  INDICATIONS AND SCHEDULE Jeny Kras Annually 9/2/2020 Please get every year    Pneumococcal 13 (Prevnar)  Covered Once after 65 10/26/2016 Please get once after your 65th birthday    Pneumococcal 23 (Pneumovax)  Covered Once after 65 10/10/2018 Please get once after your 65th birthday    Cb Mcrae

## 2021-05-20 RX ORDER — MONTELUKAST SODIUM 10 MG/1
10 TABLET ORAL NIGHTLY
Qty: 90 TABLET | Refills: 0 | OUTPATIENT
Start: 2021-05-20

## 2021-05-20 RX ORDER — MONTELUKAST SODIUM 10 MG/1
10 TABLET ORAL NIGHTLY
Qty: 90 TABLET | Refills: 0 | Status: SHIPPED | OUTPATIENT
Start: 2021-05-20 | End: 2021-08-17

## 2021-05-31 PROBLEM — L03.039 INFECTION OF TOENAIL: Status: RESOLVED | Noted: 2020-10-01 | Resolved: 2021-05-31

## 2021-05-31 NOTE — PATIENT INSTRUCTIONS
Dennys Morales's SCREENING SCHEDULE   Tests on this list are recommended by your physician but may not be covered, or covered at this frequency, by your insurer. Please check with your insurance carrier before scheduling to verify coverage.    PREVENTATIV Screen   Covered every 10 years- more often if abnormal Colonoscopy due on 09/23/2023 Update Health Maintenance if applicable    Flex Sigmoidoscopy Screen  Covered every 5 years No results found for this or any previous visit. No flowsheet data found. performed in visit on 10/26/16   • PNEUMOCOCCAL VACC, 13 EARNESTINE IM    Please get once after your 65th birthday    Pneumococcal 23 (Pneumovax)  Covered Once after 65 Orders placed or performed in visit on 10/10/18   • PNEUMOCOCCAL IMM (PNEUMOVAX)    Please get than 9 pounds     Covered at least every 3 years,         GLUCOSE (mg/dL)   Date Value   05/19/2021 107 (H)    Medicare covers annually or at 6-month intervals for prediabetic patients        Cardiovascular Disease Screening     Cholesterol, covered every this patient. No results found for this or any previous visit.    Annually (age 48 or over), HANNAH not paid separately when covered E/M service is provided on same date    Immunizations      Influenza  Covered Annually Orders placed or performed in visit on print using their home computer and printer. (the forms are also available in [address])  [email or web address]  This link also has information from the [facility] regarding Advance Directives.

## 2021-06-02 ENCOUNTER — TELEPHONE (OUTPATIENT)
Dept: FAMILY MEDICINE CLINIC | Facility: CLINIC | Age: 71
End: 2021-06-02

## 2021-06-02 DIAGNOSIS — Z12.31 ENCOUNTER FOR MAMMOGRAM TO ESTABLISH BASELINE MAMMOGRAM: Primary | ICD-10-CM

## 2021-06-02 NOTE — TELEPHONE ENCOUNTER
Last mammo (normal) was 07/07/20. Last physical was 05/19/21. Generated mammo order. My chart message sent to pt.

## 2021-06-21 RX ORDER — PRAVASTATIN SODIUM 10 MG
TABLET ORAL
Qty: 90 TABLET | Refills: 0 | Status: SHIPPED | OUTPATIENT
Start: 2021-06-21 | End: 2021-09-17

## 2021-07-07 ENCOUNTER — NURSE TRIAGE (OUTPATIENT)
Dept: FAMILY MEDICINE CLINIC | Facility: CLINIC | Age: 71
End: 2021-07-07

## 2021-07-07 NOTE — TELEPHONE ENCOUNTER
Spoke with patient and relayed Dr. Zuleika Gilliam message below--patient verbalizes understanding and agreement. Her  will take her to [facility] ER now. No further questions/concerns at this time.     7/08/2021 ER f/u

## 2021-07-07 NOTE — TELEPHONE ENCOUNTER
Action Requested: Summary for Provider     []  Critical Lab, Recommendations Needed  [x] Need Additional Advice  []   FYI    []   Need Orders  [x] Need Medications Sent to Pharmacy  []  Other     SUMMARY: Patient requests appt with Dr. Anderson Huntley only for ches

## 2021-07-08 NOTE — TELEPHONE ENCOUNTER
Patient states she did not go to ER yesterday, states she did her nebulizer and felt better. Patient woke up this morning feeling better, still has some of the symptoms but thinks she is worn out from the 4th of July party she had.   Patient going for mamm

## 2021-07-11 RX ORDER — OMEPRAZOLE 20 MG/1
20 CAPSULE, DELAYED RELEASE ORAL EVERY MORNING
Qty: 90 CAPSULE | Refills: 0 | Status: SHIPPED | OUTPATIENT
Start: 2021-07-11 | End: 2021-10-11

## 2021-07-15 ENCOUNTER — HOSPITAL ENCOUNTER (OUTPATIENT)
Dept: MAMMOGRAPHY | Age: 71
Discharge: HOME | End: 2021-07-15
Attending: FAMILY MEDICINE
Payer: MEDICARE

## 2021-07-15 DIAGNOSIS — Z12.31 ENCOUNTER FOR MAMMOGRAM TO ESTABLISH BASELINE MAMMOGRAM: ICD-10-CM

## 2021-07-15 PROCEDURE — 77063 BREAST TOMOSYNTHESIS BI: CPT | Performed by: FAMILY MEDICINE

## 2021-07-15 PROCEDURE — 77067 SCR MAMMO BI INCL CAD: CPT | Performed by: FAMILY MEDICINE

## 2021-07-30 RX ORDER — NYSTATIN 100000 U/G
1 CREAM TOPICAL 2 TIMES DAILY
Qty: 30 G | Refills: 3 | Status: SHIPPED | OUTPATIENT
Start: 2021-07-30 | End: 2021-12-14

## 2021-08-17 RX ORDER — MONTELUKAST SODIUM 10 MG/1
10 TABLET ORAL NIGHTLY
Qty: 90 TABLET | Refills: 1 | Status: SHIPPED | OUTPATIENT
Start: 2021-08-17 | End: 2021-11-07

## 2021-08-17 NOTE — TELEPHONE ENCOUNTER
[facility]. Requested Prescriptions   Pending Prescriptions Disp Refills    Montelukast Sodium 10 MG Oral Tab 90 tablet 0     Sig: Take 1 tablet (10 mg total) by mouth nightly.  At Bedtime        Asthma & COPD Medication Protocol Passed - 8/17/2021  1:51 PM        Passed - Appointment in past 6 or next 3 months                Recent Outpatient Visits              3 months ago Encounter for annual health examination    [facility], [address], [place], [place]    Office Visit    3 months ago Vasomotor rhinitis    [address] Shirley PA-C    Telemedicine    8 months ago Nonintractable episodic headache, unspecified headache type    [facility], [address] [place], Dora Mcdonough MD    Telemedicine    9 months ago Acute non-recurrent sinusitis, unspecified location    [facility], [address] [place], PA-C    Telemedicine    10 months ago Infection of toenail    [facility], [facility], [facility] Marni Tamayo MD    Office Visit             Future Appointments         Provider Department Appt Notes    In 2 weeks Yann Flores MD [facility] Ophthalmology EP DM EE   \"Policy Informed\"

## 2021-09-01 ENCOUNTER — OFFICE VISIT (OUTPATIENT)
Dept: OPHTHALMOLOGY | Facility: CLINIC | Age: 71
End: 2021-09-01
Payer: MEDICARE

## 2021-09-01 DIAGNOSIS — H43.393 VITREOUS FLOATERS OF BOTH EYES: ICD-10-CM

## 2021-09-01 DIAGNOSIS — E11.9 TYPE 2 DIABETES MELLITUS WITHOUT RETINOPATHY (HCC): Primary | ICD-10-CM

## 2021-09-01 DIAGNOSIS — H25.13 AGE-RELATED NUCLEAR CATARACT OF BOTH EYES: ICD-10-CM

## 2021-09-01 PROCEDURE — 92014 COMPRE OPH EXAM EST PT 1/>: CPT | Performed by: OPHTHALMOLOGY

## 2021-09-01 NOTE — PATIENT INSTRUCTIONS
Type 2 diabetes mellitus without retinopathy ([facility] [address]  Diabetes type II: no background of retinopathy, no signs of neovascularization noted. Discussed ocular and systemic benefits of blood sugar control.   Diagnosis and treatment discussed in detail with isidoro

## 2021-09-03 RX ORDER — ALBUTEROL SULFATE 90 UG/1
2 AEROSOL, METERED RESPIRATORY (INHALATION) EVERY 4 HOURS PRN
Qty: 1 EACH | Refills: 3 | Status: SHIPPED | OUTPATIENT
Start: 2021-09-03

## 2021-09-17 RX ORDER — PRAVASTATIN SODIUM 10 MG
TABLET ORAL
Qty: 90 TABLET | Refills: 0 | Status: SHIPPED | OUTPATIENT
Start: 2021-09-17 | End: 2022-01-24

## 2021-09-30 ENCOUNTER — NURSE TRIAGE (OUTPATIENT)
Dept: FAMILY MEDICINE CLINIC | Facility: CLINIC | Age: 71
End: 2021-09-30

## 2021-09-30 NOTE — TELEPHONE ENCOUNTER
'  Action Requested: Summary for Provider     []  Critical Lab, Recommendations Needed  [] Need Additional Advice  []   FYI    []   Need Orders  [] Need Medications Sent to Pharmacy  []  Other     SUMMARY: Office appointment made ,advised ED for worsen

## 2021-10-01 ENCOUNTER — OFFICE VISIT (OUTPATIENT)
Dept: FAMILY MEDICINE CLINIC | Facility: CLINIC | Age: 71
End: 2021-10-01
Payer: MEDICARE

## 2021-10-01 VITALS
TEMPERATURE: 98 F | BODY MASS INDEX: 35.03 KG/M2 | SYSTOLIC BLOOD PRESSURE: 130 MMHG | DIASTOLIC BLOOD PRESSURE: 92 MMHG | HEIGHT: 64 IN | RESPIRATION RATE: 18 BRPM | HEART RATE: 76 BPM | WEIGHT: 205.19 LBS

## 2021-10-01 DIAGNOSIS — R03.0 ELEVATED BLOOD PRESSURE READING: Primary | ICD-10-CM

## 2021-10-01 DIAGNOSIS — J01.11 ACUTE RECURRENT FRONTAL SINUSITIS: ICD-10-CM

## 2021-10-01 PROCEDURE — 99214 OFFICE O/P EST MOD 30 MIN: CPT | Performed by: FAMILY MEDICINE

## 2021-10-01 PROCEDURE — 3075F SYST BP GE 130 - 139MM HG: CPT | Performed by: FAMILY MEDICINE

## 2021-10-01 PROCEDURE — 3080F DIAST BP >= 90 MM HG: CPT | Performed by: FAMILY MEDICINE

## 2021-10-01 PROCEDURE — 93000 ELECTROCARDIOGRAM COMPLETE: CPT | Performed by: FAMILY MEDICINE

## 2021-10-01 PROCEDURE — 3008F BODY MASS INDEX DOCD: CPT | Performed by: FAMILY MEDICINE

## 2021-10-01 RX ORDER — LEVOFLOXACIN 500 MG/1
500 TABLET, FILM COATED ORAL DAILY
Qty: 7 TABLET | Refills: 0 | Status: SHIPPED | OUTPATIENT
Start: 2021-10-01 | End: 2021-10-08

## 2021-10-01 NOTE — PROGRESS NOTES
HPI: Claudia is a 66year old female who presents for HTN. Woke up yesterday and BP was elevated. She was dizzy. BP was 177/101. Retook it again and it was 155/70. Before bed, it was 155/77. This morning it was still. Has sinus congestion and pain.  Get daily with breakfast., Disp: 90 tablet, Rfl: 3  Meclizine HCl 25 MG Oral Tab, Take 1 tablet (25 mg total) by mouth 2 (two) times daily as needed. , Disp: 60 tablet, Rfl: 0  Fluticasone Propionate (FLONASE) 50 MCG/ACT Nasal Suspension, 2 sprays by Nasal rout Acute recurrent frontal sinusitis    Start Clindamycin.      Preet Merino DO

## 2021-10-11 RX ORDER — OMEPRAZOLE 20 MG/1
20 CAPSULE, DELAYED RELEASE ORAL EVERY MORNING
Qty: 90 CAPSULE | Refills: 1 | Status: SHIPPED | OUTPATIENT
Start: 2021-10-11

## 2021-10-11 NOTE — TELEPHONE ENCOUNTER
Refill passed per [facility] protocol. Requested Prescriptions   Pending Prescriptions Disp Refills    omeprazole 20 MG Oral Capsule Delayed Release 90 capsule 0     Sig: Take 1 capsule (20 mg total) by mouth every morning.         Gastrointestional

## 2021-11-01 ENCOUNTER — NURSE TRIAGE (OUTPATIENT)
Dept: FAMILY MEDICINE CLINIC | Facility: CLINIC | Age: 71
End: 2021-11-01

## 2021-11-01 RX ORDER — PREDNISONE 20 MG/1
40 TABLET ORAL DAILY
Qty: 10 TABLET | Refills: 0 | Status: SHIPPED | OUTPATIENT
Start: 2021-11-01 | End: 2021-11-06

## 2021-11-01 NOTE — TELEPHONE ENCOUNTER
Action Requested: Summary for Provider     []  Critical Lab, Recommendations Needed  [x] Need Additional Advice  []   FYI    []   Need Orders  [x] Need Medications Sent to Pharmacy  []  Other     SUMMARY: OV in 3 days, patient refusing, would like to speak

## 2021-11-01 NOTE — TELEPHONE ENCOUNTER
Please let her know Rx sent to pharmacy. Call if not improved in 5 days, sooner if fever, shortness of breath or other concerns.

## 2021-11-07 RX ORDER — MONTELUKAST SODIUM 10 MG/1
10 TABLET ORAL NIGHTLY
Qty: 90 TABLET | Refills: 1 | Status: SHIPPED | OUTPATIENT
Start: 2021-11-07 | End: 2022-08-01

## 2021-11-07 NOTE — TELEPHONE ENCOUNTER
Refill passed per [facility] protocol. Requested Prescriptions   Pending Prescriptions Disp Refills    metFORMIN 500 MG Oral Tab 90 tablet 3     Sig: Take 1 tablet (500 mg total) by mouth daily with breakfast.        Diabetes Medication Protocol Passed - 11/7/2021  9:20 AM        Passed - Last A1C < 7.5 and within past 6 months     Lab Results   Component Value Date    A1C 6.4 (H) 05/19/2021               Passed - Appointment in past 6 or next 3 months        Passed - GFR  > 50     Lab Results   Component Value Date    GFRAA 83 05/19/2021                 Passed - GFR in the past 12 months           montelukast 10 MG Oral Tab 90 tablet 1     Sig: Take 1 tablet (10 mg total) by mouth nightly.  At Bedtime        Asthma & COPD Medication Protocol Passed - 11/7/2021  9:20 AM        Passed - Appointment in past 6 or next 3 months                Recent Outpatient Visits              1 month ago Elevated blood pressure reading    [place] [employer] , [place], [place]    Office Visit    2 months ago Type 2 diabetes mellitus without retinopathy [facility]hthalmology Josee Ortiz MD    Office Visit    5 months ago Encounter for annual health examination    [place] [employer] [facility] , [place]    Office Visit    6 months ago Vasomotor rhinitis    [address] Jina Holder PA-C    Telemedicine    11 months ago Nonintractable episodic headache, unspecified headache type    [facility] Tiffany Moreira MD    Telemedicine

## 2021-11-08 RX ORDER — LANCETS
EACH MISCELLANEOUS
Qty: 100 EACH | Refills: 1 | Status: SHIPPED | OUTPATIENT
Start: 2021-11-08 | End: 2022-06-17

## 2021-11-08 RX ORDER — BLOOD SUGAR DIAGNOSTIC
STRIP MISCELLANEOUS
Qty: 100 STRIP | Refills: 1 | Status: SHIPPED | OUTPATIENT
Start: 2021-11-08 | End: 2022-08-31

## 2021-11-08 NOTE — TELEPHONE ENCOUNTER
Refill passed per [facility] protocol.      Requested Prescriptions   Pending Prescriptions Disp Refills    ACCU-CHEK SOFTCLIX LANCETS Does not apply Misc [Pharmacy Med Name: Accu-Chek Softclix Lancets Mis Roch]  0     Sig: USE TO TEST BLOOD SUGAR ONCE DAILY        Diabetic Supplies Protocol Passed - 11/8/2021  1:41 PM        Passed - Appointment in past 15 or next 3 months           ACCU-CHEK LORRAINE PLUS In [employer] Med Name: Accu-Chek Lorraine Plus Cecilia Kris]  0     Sig: Check blood sugar once daily        Diabetic Supplies Protocol Passed - 11/8/2021  1:41 PM        Passed - Appointment in past 12 or next 3 months              [unfilled]      @[email or web address]@

## 2021-12-09 ENCOUNTER — TELEPHONE (OUTPATIENT)
Dept: FAMILY MEDICINE CLINIC | Facility: CLINIC | Age: 71
End: 2021-12-09

## 2021-12-09 NOTE — TELEPHONE ENCOUNTER
Dr. Irene Tovar Pt called request for her metformin 500 mg once a day to be increased to BID because she sometimes has to take a second one at night because her sugar is high.  Pt was advised that she needs to take her medication as directed and informed us if h

## 2021-12-10 NOTE — TELEPHONE ENCOUNTER
Pt made a appt for 12/14    Future Appointments   Date Time Provider Ирина Tam   12/14/2021 10:30 AM Beto Villeda, [address] Harris

## 2021-12-14 ENCOUNTER — OFFICE VISIT (OUTPATIENT)
Dept: FAMILY MEDICINE CLINIC | Facility: CLINIC | Age: 71
End: 2021-12-14
Payer: MEDICARE

## 2021-12-14 VITALS
RESPIRATION RATE: 18 BRPM | BODY MASS INDEX: 36 KG/M2 | HEART RATE: 74 BPM | DIASTOLIC BLOOD PRESSURE: 72 MMHG | SYSTOLIC BLOOD PRESSURE: 150 MMHG | TEMPERATURE: 98 F | WEIGHT: 207 LBS

## 2021-12-14 DIAGNOSIS — E11.9 TYPE 2 DIABETES MELLITUS WITHOUT COMPLICATION, WITHOUT LONG-TERM CURRENT USE OF INSULIN (HCC): Primary | ICD-10-CM

## 2021-12-14 DIAGNOSIS — J01.11 ACUTE RECURRENT FRONTAL SINUSITIS: ICD-10-CM

## 2021-12-14 DIAGNOSIS — I10 PRIMARY HYPERTENSION: ICD-10-CM

## 2021-12-14 PROCEDURE — 3078F DIAST BP <80 MM HG: CPT | Performed by: FAMILY MEDICINE

## 2021-12-14 PROCEDURE — 83036 HEMOGLOBIN GLYCOSYLATED A1C: CPT | Performed by: FAMILY MEDICINE

## 2021-12-14 PROCEDURE — 90662 IIV NO PRSV INCREASED AG IM: CPT | Performed by: FAMILY MEDICINE

## 2021-12-14 PROCEDURE — 99214 OFFICE O/P EST MOD 30 MIN: CPT | Performed by: FAMILY MEDICINE

## 2021-12-14 PROCEDURE — G0008 ADMIN INFLUENZA VIRUS VAC: HCPCS | Performed by: FAMILY MEDICINE

## 2021-12-14 PROCEDURE — 3077F SYST BP >= 140 MM HG: CPT | Performed by: FAMILY MEDICINE

## 2021-12-14 PROCEDURE — 3044F HG A1C LEVEL LT 7.0%: CPT | Performed by: FAMILY MEDICINE

## 2021-12-14 RX ORDER — LISINOPRIL 10 MG/1
10 TABLET ORAL DAILY
Qty: 30 TABLET | Refills: 0 | Status: SHIPPED | OUTPATIENT
Start: 2021-12-14 | End: 2022-01-12

## 2021-12-14 RX ORDER — NYSTATIN 100000 U/G
1 CREAM TOPICAL 2 TIMES DAILY
Qty: 30 G | Refills: 3 | Status: SHIPPED | OUTPATIENT
Start: 2021-12-14

## 2021-12-14 RX ORDER — DIPHENHYDRAMINE HYDROCHLORIDE 25 MG/1
CAPSULE, LIQUID FILLED ORAL
Qty: 1 KIT | Refills: 0 | Status: SHIPPED | OUTPATIENT
Start: 2021-12-14

## 2021-12-14 NOTE — PROGRESS NOTES
HPI: Roque Smith is a 66year old female who presents for DM follow-up. Pt has sinus congestion and pressure. Has ringing in her ears. Has stuffy nose. No fever. Fatigued. No knows exposure to COVID.  Has had multiple sinus infections in the past.  Symptoms 3  Levocetirizine Dihydrochloride 5 MG Oral Tab, Take 1 tablet (5 mg total) by mouth every evening., Disp: 90 tablet, Rfl: 1  fluticasone-salmeterol 250-50 MCG/DOSE Inhalation Aerosol Powder, Breath Activated, INHALE ONE PUFF BY MOUTH EVERY 12 HOURS, Disp: Accucheck- 141. Acute recurrent frontal sinusitis    Symptoms just started today. Will monitor for worsening symptoms as pt has had frequent sinus infections before.      Primary hypertension    BP elevated today and has been in the past.  May be contri

## 2021-12-27 ENCOUNTER — NURSE TRIAGE (OUTPATIENT)
Dept: FAMILY MEDICINE CLINIC | Facility: CLINIC | Age: 71
End: 2021-12-27

## 2021-12-27 ENCOUNTER — TELEMEDICINE (OUTPATIENT)
Dept: INTERNAL MEDICINE CLINIC | Facility: CLINIC | Age: 71
End: 2021-12-27
Payer: MEDICARE

## 2021-12-27 DIAGNOSIS — H57.11 ACUTE RIGHT EYE PAIN: Primary | ICD-10-CM

## 2021-12-27 PROCEDURE — 99213 OFFICE O/P EST LOW 20 MIN: CPT | Performed by: NURSE PRACTITIONER

## 2021-12-27 RX ORDER — ERYTHROMYCIN 5 MG/G
1 OINTMENT OPHTHALMIC EVERY 6 HOURS
Qty: 3.5 G | Refills: 0 | Status: SHIPPED | OUTPATIENT
Start: 2021-12-27

## 2021-12-27 NOTE — PROGRESS NOTES
Virtual Visit Check-In    [address] or legal guardian   verbally consents to a Virtual Visit Check-In service on 12/27/2021    Patient understands and accepts financial responsibility for any deductible, co-insurance and/or co-pays associated with t daily with meals. 180 tablet 1   • lisinopril 10 MG Oral Tab Take 1 tablet (10 mg total) by mouth daily. 30 tablet 0   • Accu-Chek Softclix Lancets Does not apply Misc Check blood glucose once daily.  100 each 1   • Glucose Blood (ACCU-CHEK LORRAINE PLUS) In V Application into both eyes every 6 (six) hours. , Disp: 3.5 g, Rfl: 0  •  Lancets Misc. Does not apply Misc, To check blood sugar once daily, Disp: 100 each, Rfl: 3  •  Glucose Blood In Vitro Strip, place 1 by Misc. (Non-Drug; Combo Route) route 3 times ever total) by mouth 2 (two) times daily as needed. , Disp: 60 tablet, Rfl: 0  •  Fluticasone Propionate (FLONASE) 50 MCG/ACT Nasal Suspension, 2 sprays by Nasal route daily. , Disp: 3 Bottle, Rfl: 1  •  aspirin 81 MG Oral Tab, Take 81 mg by mouth daily. , Disp: , meetings/ gatherings in face of this Covid 19 pandemic. Patient verbalized understanding of plan and all questions answered to the best of my ability. Patient to call back if any change/ worsening of symptoms.     No orders of the defined types were p

## 2021-12-31 ENCOUNTER — TELEPHONE (OUTPATIENT)
Dept: CASE MANAGEMENT | Age: 71
End: 2021-12-31

## 2021-12-31 NOTE — TELEPHONE ENCOUNTER
Patient is eligible for a 2022 Medicare Annual Wellness visit. This visit can be scheduled any time in the calendar year. Discussed in detail w/patient. AHA scheduled for 3/16/22.

## 2022-01-12 ENCOUNTER — OFFICE VISIT (OUTPATIENT)
Dept: FAMILY MEDICINE CLINIC | Facility: CLINIC | Age: 72
End: 2022-01-12
Payer: MEDICARE

## 2022-01-12 VITALS
BODY MASS INDEX: 35 KG/M2 | DIASTOLIC BLOOD PRESSURE: 75 MMHG | WEIGHT: 205 LBS | TEMPERATURE: 98 F | SYSTOLIC BLOOD PRESSURE: 133 MMHG | HEART RATE: 83 BPM | RESPIRATION RATE: 16 BRPM

## 2022-01-12 DIAGNOSIS — I10 PRIMARY HYPERTENSION: Primary | ICD-10-CM

## 2022-01-12 PROCEDURE — 99213 OFFICE O/P EST LOW 20 MIN: CPT | Performed by: FAMILY MEDICINE

## 2022-01-12 PROCEDURE — 3075F SYST BP GE 130 - 139MM HG: CPT | Performed by: FAMILY MEDICINE

## 2022-01-12 PROCEDURE — 3078F DIAST BP <80 MM HG: CPT | Performed by: FAMILY MEDICINE

## 2022-01-12 RX ORDER — LISINOPRIL 10 MG/1
10 TABLET ORAL DAILY
Qty: 90 TABLET | Refills: 1 | Status: SHIPPED | OUTPATIENT
Start: 2022-01-12

## 2022-01-12 RX ORDER — LISINOPRIL 5 MG/1
5 TABLET ORAL DAILY
Qty: 90 TABLET | Refills: 1 | Status: SHIPPED | OUTPATIENT
Start: 2022-01-12

## 2022-01-12 NOTE — PROGRESS NOTES
HPI: Yen Olguin is a 66year old female who presents for follow-up. Started Lisinopril 10mg daily last visit. BP better today. Yesterday was 144/87 when she found out her sister had COVID. States systolic has been in the 214K. No side effects.      PMH:    Pa Rfl: 1  PRAVASTATIN 10 MG Oral Tab, TAKE ONE TABLET BY MOUTH NIGHTLY, Disp: 90 tablet, Rfl: 0  Albuterol Sulfate HFA (PROAIR HFA) 108 (90 Base) MCG/ACT Inhalation Aero Soln, Inhale 2 puffs into the lungs every 4 (four) hours as needed for Wheezing.  inhale

## 2022-01-24 RX ORDER — PRAVASTATIN SODIUM 10 MG
TABLET ORAL
Qty: 90 TABLET | Refills: 3 | Status: SHIPPED | OUTPATIENT
Start: 2022-01-24

## 2022-03-05 ENCOUNTER — NURSE TRIAGE (OUTPATIENT)
Dept: FAMILY MEDICINE CLINIC | Facility: CLINIC | Age: 72
End: 2022-03-05

## 2022-03-06 ENCOUNTER — HOSPITAL ENCOUNTER (OUTPATIENT)
Age: 72
Discharge: HOME | End: 2022-03-06
Payer: MEDICARE

## 2022-03-06 ENCOUNTER — APPOINTMENT (OUTPATIENT)
Dept: GENERAL RADIOLOGY | Age: 72
End: 2022-03-06
Attending: NURSE PRACTITIONER
Payer: MEDICARE

## 2022-03-06 VITALS
OXYGEN SATURATION: 97 % | HEART RATE: 85 BPM | RESPIRATION RATE: 18 BRPM | DIASTOLIC BLOOD PRESSURE: 72 MMHG | SYSTOLIC BLOOD PRESSURE: 149 MMHG | TEMPERATURE: 97 F

## 2022-03-06 DIAGNOSIS — R05.9 COUGH: ICD-10-CM

## 2022-03-06 DIAGNOSIS — J01.90 ACUTE SINUSITIS, RECURRENCE NOT SPECIFIED, UNSPECIFIED LOCATION: Primary | ICD-10-CM

## 2022-03-06 LAB — SARS-COV-2 RNA RESP QL NAA+PROBE: NOT DETECTED

## 2022-03-06 PROCEDURE — 71046 X-RAY EXAM CHEST 2 VIEWS: CPT | Performed by: NURSE PRACTITIONER

## 2022-03-06 PROCEDURE — U0002 COVID-19 LAB TEST NON-CDC: HCPCS | Performed by: NURSE PRACTITIONER

## 2022-03-06 PROCEDURE — 99203 OFFICE O/P NEW LOW 30 MIN: CPT | Performed by: NURSE PRACTITIONER

## 2022-03-06 RX ORDER — ALBUTEROL SULFATE 90 UG/1
2 AEROSOL, METERED RESPIRATORY (INHALATION) EVERY 4 HOURS PRN
Qty: 1 EACH | Refills: 0 | Status: SHIPPED | OUTPATIENT
Start: 2022-03-06 | End: 2022-04-05

## 2022-03-06 RX ORDER — DOXYCYCLINE HYCLATE 100 MG/1
100 CAPSULE ORAL 2 TIMES DAILY
Qty: 20 CAPSULE | Refills: 0 | Status: SHIPPED | OUTPATIENT
Start: 2022-03-06 | End: 2022-03-16

## 2022-03-06 RX ORDER — BENZONATATE 100 MG/1
200 CAPSULE ORAL 3 TIMES DAILY PRN
Qty: 30 CAPSULE | Refills: 0 | Status: SHIPPED | OUTPATIENT
Start: 2022-03-06 | End: 2022-04-05

## 2022-03-06 NOTE — ED INITIAL ASSESSMENT (HPI)
Pt presents with diff breathing x 1 week. Hx of asthma and bronchitis. Pt used nebulizer with Albuterol x1 today. Took Prednisone 20mg PO yesterday (\"from old prescription\", per pt)    Cough+ No fever. I had the same symptoms 3 months ago, they treated me with antibiotics and steroids and I felt better. Breathing even and unlabored, does not appear to be in distress.  O2 Sat upon arrival at rest 98%

## 2022-03-07 NOTE — TELEPHONE ENCOUNTER
----- Message from Bonnie Currie RN sent at 3/7/2022 11:26 AM CST -----  Regarding: OK with appointment - calling to confirm       Action Requested: Summary for Provider     []  Critical Lab, Recommendations Needed  [] Need Additional Advice  [x]   FYI    []   Need Orders  [] Need Medications Sent to Pharmacy  []  Other     SUMMARY: Patient states she woke up this morning with yellow

## 2022-03-08 ENCOUNTER — TELEPHONE (OUTPATIENT)
Dept: FAMILY MEDICINE CLINIC | Facility: CLINIC | Age: 72
End: 2022-03-08

## 2022-03-08 NOTE — TELEPHONE ENCOUNTER
Patient asking if Yumiko Ro can see her tomorrow. No available appointments. Patient states she went to immediate care on [place] 3/6. She was treated for sinusitis. Patient states she is feeling better, but was told to follow up with her doctor in 2 days. Patient states her chest X-ray showed \"an enlarged heart\". Patient is not having any chest pain,irregular heart rate or difficulty breathing. Patient currently has an appointment 3/16/22 for MA Supervisit. Please advise.

## 2022-03-08 NOTE — TELEPHONE ENCOUNTER
Called patient, confirmed name and .     Assisted to schedule a sooner appointment:    Future Appointments   Date Time Provider Ирина Tam   3/10/2022  2:00 PM Corrinne Lacrosse [place], [place] YONNY Harris   3/16/2022  2:30 PM Sascha Suero DO [facility]

## 2022-03-10 ENCOUNTER — OFFICE VISIT (OUTPATIENT)
Dept: FAMILY MEDICINE CLINIC | Facility: CLINIC | Age: 72
End: 2022-03-10
Payer: MEDICARE

## 2022-03-10 VITALS
BODY MASS INDEX: 36 KG/M2 | RESPIRATION RATE: 20 BRPM | TEMPERATURE: 98 F | OXYGEN SATURATION: 95 % | WEIGHT: 208 LBS | HEART RATE: 79 BPM | DIASTOLIC BLOOD PRESSURE: 76 MMHG | SYSTOLIC BLOOD PRESSURE: 132 MMHG

## 2022-03-10 DIAGNOSIS — R06.02 SHORTNESS OF BREATH: ICD-10-CM

## 2022-03-10 DIAGNOSIS — R07.89 CHEST PRESSURE: Primary | ICD-10-CM

## 2022-03-10 PROCEDURE — 93000 ELECTROCARDIOGRAM COMPLETE: CPT | Performed by: FAMILY MEDICINE

## 2022-03-10 PROCEDURE — 99213 OFFICE O/P EST LOW 20 MIN: CPT | Performed by: FAMILY MEDICINE

## 2022-03-10 PROCEDURE — 3075F SYST BP GE 130 - 139MM HG: CPT | Performed by: FAMILY MEDICINE

## 2022-03-10 PROCEDURE — 3078F DIAST BP <80 MM HG: CPT | Performed by: FAMILY MEDICINE

## 2022-03-10 RX ORDER — PREDNISONE 20 MG/1
20 TABLET ORAL 2 TIMES DAILY
Qty: 10 TABLET | Refills: 0 | Status: SHIPPED | OUTPATIENT
Start: 2022-03-10 | End: 2022-03-15

## 2022-04-12 ENCOUNTER — TELEPHONE (OUTPATIENT)
Dept: FAMILY MEDICINE CLINIC | Facility: CLINIC | Age: 72
End: 2022-04-12

## 2022-04-12 NOTE — TELEPHONE ENCOUNTER
Patient was at [facility] last Wednesday for a fall and was diagnosed with seventh vertebrae right side fracture not shattered. Patient referred to [facility] and needs referral. Please advise and fax referral to the following number.     FAX# [phone]

## 2022-04-14 ENCOUNTER — OFFICE VISIT (OUTPATIENT)
Dept: FAMILY MEDICINE CLINIC | Facility: CLINIC | Age: 72
End: 2022-04-14
Payer: MEDICARE

## 2022-04-14 VITALS
RESPIRATION RATE: 18 BRPM | TEMPERATURE: 98 F | SYSTOLIC BLOOD PRESSURE: 148 MMHG | DIASTOLIC BLOOD PRESSURE: 80 MMHG | HEART RATE: 85 BPM

## 2022-04-14 DIAGNOSIS — S32.009D CLOSED FRACTURE OF TRANSVERSE PROCESS OF LUMBAR VERTEBRA WITH ROUTINE HEALING, SUBSEQUENT ENCOUNTER: Primary | ICD-10-CM

## 2022-04-14 PROCEDURE — 99213 OFFICE O/P EST LOW 20 MIN: CPT | Performed by: FAMILY MEDICINE

## 2022-04-14 PROCEDURE — 3077F SYST BP >= 140 MM HG: CPT | Performed by: FAMILY MEDICINE

## 2022-04-14 PROCEDURE — 3079F DIAST BP 80-89 MM HG: CPT | Performed by: FAMILY MEDICINE

## 2022-04-14 RX ORDER — PSEUDOEPHEDRINE HCL 30 MG
100 TABLET ORAL DAILY
COMMUNITY
Start: 2022-04-07 | End: 2022-05-07

## 2022-04-14 RX ORDER — HYDROCODONE BITARTRATE AND ACETAMINOPHEN 5; 325 MG/1; MG/1
1 TABLET ORAL EVERY 6 HOURS PRN
Qty: 30 TABLET | Refills: 0 | Status: SHIPPED | OUTPATIENT
Start: 2022-04-14

## 2022-04-14 RX ORDER — ACETAMINOPHEN 500 MG
500 TABLET ORAL
COMMUNITY
Start: 2022-04-07

## 2022-04-14 RX ORDER — IBUPROFEN 600 MG/1
600 TABLET ORAL EVERY 6 HOURS PRN
Qty: 30 TABLET | Refills: 0 | Status: SHIPPED | OUTPATIENT
Start: 2022-04-14

## 2022-04-14 RX ORDER — OMEPRAZOLE 20 MG/1
20 CAPSULE, DELAYED RELEASE ORAL EVERY MORNING
Qty: 90 CAPSULE | Refills: 1 | Status: SHIPPED | OUTPATIENT
Start: 2022-04-14 | End: 2022-09-21

## 2022-04-14 NOTE — TELEPHONE ENCOUNTER
Refill passed per [facility] protocol. Requested Prescriptions   Pending Prescriptions Disp Refills    OMEPRAZOLE 20 MG Oral Capsule Delayed Release [Pharmacy Med Name: Omeprazole Dr 20 Mg Cap Nort] 90 capsule 0     Sig: TAKE ONE CAPSULE BY MOUTH IN THE MORNING        Gastrointestional Medication Protocol Passed - 4/14/2022  1:33 AM        Passed - Appointment in past 12 or next 3 months                Recent Outpatient Visits              1 month ago Chest pressure    [facility] [address] [place], [place]    Office Visit    3 months ago Primary hypertension    [place] [address], [place], [place]    Office Visit    3 months ago Acute right eye pain    [facility], [address] [facility], [facility], [facility]    Telemedicine    4 months ago Type 2 diabetes mellitus without complication, without long-term current use of insulin [facility])    [place] [address], [place], [place]    Office Visit    6 months ago Elevated blood pressure reading    [place] [address], [place], [place]    Office Visit             Future Appointments         Provider Department Appt Notes    Today [place], [address], [place] [address], [address] ER follow up- Fall last Wednesday back fracture. Unaware needed to be seen- ok per [place] to schedule.  POLICY INF *BA    In 2 weeks Sandy Ac DO [address] px   policy informed

## 2022-04-14 NOTE — TELEPHONE ENCOUNTER
Avery Curtis,    Please see message below:    [facility] is not in network with the patient's insurance. Please see list of Ortho's in network.     Thank you  Noble Adkins MD

## 2022-05-01 RX ORDER — ALBUTEROL SULFATE 90 UG/1
2 AEROSOL, METERED RESPIRATORY (INHALATION) EVERY 4 HOURS PRN
Qty: 1 EACH | Refills: 2 | Status: SHIPPED | OUTPATIENT
Start: 2022-05-01 | End: 2022-09-21

## 2022-05-01 NOTE — TELEPHONE ENCOUNTER
Refill passed per [facility] protocol. Requested Prescriptions   Pending Prescriptions Disp Refills    albuterol (PROAIR HFA) 108 (90 Base) MCG/ACT Inhalation Aero Soln 1 each 3     Sig: Inhale 2 puffs into the lungs every 4 (four) hours as needed for Wheezing.  inhale 2 puff by inhalation route  every 4 - 6 hours as needed        Asthma & COPD Medication Protocol Passed - 4/30/2022  7:51 PM        Passed - Appointment in past 6 or next 3 months              Recent Outpatient Visits              2 weeks ago Closed fracture of transverse process of lumbar vertebra with routine healing, subsequent encounter    [place] [email or web address], [address], [place], [place]    Office Visit    1 month ago Chest pressure    [address] [facility] [place], [facility]    Office Visit    3 months ago Primary hypertension    [facility], [address], [place], [place]    Office Visit    4 months ago Acute right eye pain    [place] [employer] [facility], [address], [place], [place]    Telemedicine    4 months ago Type 2 diabetes mellitus without complication, without long-term current use of insulin [facility])    [place] [email or web address], [address], [place], [place]    Office Visit            Future Appointments         Provider Department Appt Notes    In 2 months Jacqueline Merlos DO [place] [employer] [facility], [address], [address] [place] px   policy informed

## 2022-05-03 RX ORDER — IBUPROFEN 600 MG/1
600 TABLET ORAL EVERY 6 HOURS PRN
Qty: 30 TABLET | Refills: 0 | Status: SHIPPED | OUTPATIENT
Start: 2022-05-03

## 2022-05-06 RX ORDER — FLUTICASONE PROPIONATE AND SALMETEROL 250; 50 UG/1; UG/1
POWDER RESPIRATORY (INHALATION)
Qty: 1 EACH | Refills: 0 | Status: SHIPPED | OUTPATIENT
Start: 2022-05-06 | End: 2023-01-18

## 2022-05-06 NOTE — TELEPHONE ENCOUNTER
Refill passed per [place] [employer] [facility] [facility] protocol.     Requested Prescriptions   Pending Prescriptions Disp Refills    fluticasone-salmeterol 250-50 MCG/ACT Inhalation Aerosol Powder, Breath Activated  0     Sig: INHALE ONE PUFF BY MOUTH EVERY 12 HOURS        Asthma & COPD Medication Protocol Passed - 5/6/2022  3:11 PM        Passed - Appointment in past 6 or next 3 months            Recent Outpatient Visits              3 weeks ago Closed fracture of transverse process of lumbar vertebra with routine healing, subsequent encounter    [address] [place], [place]    Office Visit    1 month ago Chest pressure    [address] [place], [place]    Office Visit    3 months ago Primary hypertension    [facility], [address] , [place], [place]    Office Visit    4 months ago Acute right eye pain    [facility], [address] [place], [place]    Telemedicine    4 months ago Type 2 diabetes mellitus without complication, without long-term current use of insulin [facility])    [facility], [address], [place], [place]    Office Visit          Future Appointments         Provider Department Appt Notes    In 1 month Lew Lr DO [facility], [place], [facility] [place] px   policy informed

## 2022-05-13 RX ORDER — NYSTATIN 100000 U/G
1 CREAM TOPICAL 2 TIMES DAILY
Qty: 30 G | Refills: 3 | Status: SHIPPED | OUTPATIENT
Start: 2022-05-13

## 2022-05-13 NOTE — TELEPHONE ENCOUNTER
Please review; protocol failed. Long term medication  Requested Prescriptions   Pending Prescriptions Disp Refills    nystatin [id number] UNIT/GM External Cream 30 g 3     Sig: Apply 1 Application topically 2 (two) times daily.         There is no refill protocol information for this order           Future Appointments         Provider Department Appt Notes    In 1 month Nadya Garcia DO [place] [employer], [employer], [address], [facility] px   policy informed            Recent Outpatient Visits              4 weeks ago Closed fracture of transverse process of lumbar vertebra with routine healing, subsequent encounter    [address] [place] [facility]    Office Visit    2 months ago Chest pressure    [address] [place] [facility]    Office Visit    4 months ago Primary hypertension    [facility], [address] , [place]    Office Visit    4 months ago Acute right eye pain    [facility], [address] [facility], [facility]    Telemedicine    5 months ago Type 2 diabetes mellitus without complication, without long-term current use of insulin [facility])    [facility], [place] , [place]    Office Visit

## 2022-05-24 RX ORDER — IBUPROFEN 600 MG/1
600 TABLET ORAL EVERY 6 HOURS PRN
Qty: 30 TABLET | Refills: 0 | Status: SHIPPED | OUTPATIENT
Start: 2022-05-24

## 2022-05-24 RX ORDER — IBUPROFEN 600 MG/1
600 TABLET ORAL EVERY 6 HOURS PRN
Qty: 30 TABLET | Refills: 0 | OUTPATIENT
Start: 2022-05-24

## 2022-06-03 ENCOUNTER — TELEPHONE (OUTPATIENT)
Dept: FAMILY MEDICINE CLINIC | Facility: CLINIC | Age: 72
End: 2022-06-03

## 2022-06-03 NOTE — TELEPHONE ENCOUNTER
Spoke with Lyn Olson, states that it is just a recommendation, if Dr Alek Stone would like patient to start an osteoporosis medication either Alendronate or Risedronate (see below )  Or not, it is up to the PCP. Requesting a call back for any decision or recommendation.

## 2022-06-03 NOTE — TELEPHONE ENCOUNTER
Dr Abran Sneed this is only their pharmacy protocol, they have not see or review any dexa scan. Thanks.

## 2022-06-03 NOTE — TELEPHONE ENCOUNTER
[id number] calling with recommendation regarding osteoporosis to start the patient on the following meds below:    Requesting callback to discuss     ALENDRONATE 70 MG - once weekly   RISEDRONATE 35 MG - once weekly

## 2022-06-03 NOTE — TELEPHONE ENCOUNTER
Has he reviewed her Dexa scan? ?  Did her order one? Her last one was in 2017 that I see and she has a new order for one.  I don't see any new results

## 2022-06-13 NOTE — TELEPHONE ENCOUNTER
Please review; Protocol Failed / No protocol. Requested Prescriptions   Pending Prescriptions Disp Refills    METFORMIN 500 MG Oral Tab [Pharmacy Med Name: Metformin Hydrochloride 500 Mg Tab Gran] 180 tablet 0     Sig: Take 1 tablet by mouth 2 times daily with meals.         Diabetes Medication Protocol Failed - 6/10/2022  1:31 AM        Failed - GFR in the past 12 months        Passed - Last A1C < 7.5 and within past 6 months     Lab Results   Component Value Date    A1C 6.7 (A) 12/14/2021               Passed - Appointment in past 6 or next 3 months        Passed - GFR  > 50     Lab Results   Component Value Date    GFRAA 83 05/19/2021                        Recent Outpatient Visits              2 months ago Closed fracture of transverse process of lumbar vertebra with routine healing, subsequent encounter    [facility], [place] [address], [place], [place]    Office Visit    3 months ago Chest pressure    [address], [place], [place]    Office Visit    5 months ago Primary hypertension    [facility], [place] [address], [place], [place]    Office Visit    5 months ago Acute right eye pain    [facility], [address], [place], [place], [facility]    Telemedicine    6 months ago Type 2 diabetes mellitus without complication, without long-term current use of insulin [facility])    [facility], [place] [address], [place], [place]    Office Visit             Future Appointments         Provider Department Appt Notes    In 2 weeks Jorge Steve, [facility], [facility] [address] [place] [address]   policy informed

## 2022-06-17 RX ORDER — LANCETS
EACH MISCELLANEOUS
Qty: 90 EACH | Refills: 3 | Status: SHIPPED | OUTPATIENT
Start: 2022-06-17 | End: 2022-08-31

## 2022-07-02 RX ORDER — LISINOPRIL 5 MG/1
5 TABLET ORAL DAILY
Qty: 90 TABLET | Refills: 0 | Status: SHIPPED | OUTPATIENT
Start: 2022-07-02

## 2022-07-02 RX ORDER — LISINOPRIL 10 MG/1
10 TABLET ORAL DAILY
Qty: 90 TABLET | Refills: 0 | Status: SHIPPED | OUTPATIENT
Start: 2022-07-02

## 2022-07-06 ENCOUNTER — TELEPHONE (OUTPATIENT)
Dept: FAMILY MEDICINE CLINIC | Facility: CLINIC | Age: 72
End: 2022-07-06

## 2022-07-06 DIAGNOSIS — Z12.31 ENCOUNTER FOR MAMMOGRAM TO ESTABLISH BASELINE MAMMOGRAM: Primary | ICD-10-CM

## 2022-07-06 NOTE — TELEPHONE ENCOUNTER
Last mammogram was   7/15/21  Last OV: 4/14/22    Mammogram was ordered.  Patient has been notified and transferred

## 2022-08-01 RX ORDER — MONTELUKAST SODIUM 10 MG/1
10 TABLET ORAL NIGHTLY
Qty: 90 TABLET | Refills: 0 | Status: SHIPPED | OUTPATIENT
Start: 2022-08-01

## 2022-08-31 ENCOUNTER — LAB ENCOUNTER (OUTPATIENT)
Dept: LAB | Age: 72
End: 2022-08-31
Attending: FAMILY MEDICINE
Payer: MEDICARE

## 2022-08-31 ENCOUNTER — OFFICE VISIT (OUTPATIENT)
Dept: FAMILY MEDICINE CLINIC | Facility: CLINIC | Age: 72
End: 2022-08-31

## 2022-08-31 VITALS
TEMPERATURE: 98 F | BODY MASS INDEX: 35.95 KG/M2 | WEIGHT: 208 LBS | SYSTOLIC BLOOD PRESSURE: 122 MMHG | RESPIRATION RATE: 18 BRPM | HEIGHT: 63.78 IN | HEART RATE: 78 BPM | DIASTOLIC BLOOD PRESSURE: 70 MMHG

## 2022-08-31 DIAGNOSIS — Z00.00 ENCOUNTER FOR ANNUAL HEALTH EXAMINATION: Primary | ICD-10-CM

## 2022-08-31 DIAGNOSIS — J44.9 ASTHMA WITH COPD (CHRONIC OBSTRUCTIVE PULMONARY DISEASE) (HCC): Chronic | ICD-10-CM

## 2022-08-31 DIAGNOSIS — E66.01 SEVERE OBESITY (BMI 35.0-39.9) WITH COMORBIDITY (HCC): Chronic | ICD-10-CM

## 2022-08-31 DIAGNOSIS — G47.33 OBSTRUCTIVE SLEEP APNEA: ICD-10-CM

## 2022-08-31 DIAGNOSIS — E28.39 ESTROGEN DEFICIENCY: ICD-10-CM

## 2022-08-31 DIAGNOSIS — E11.9 TYPE 2 DIABETES MELLITUS WITHOUT COMPLICATION, WITHOUT LONG-TERM CURRENT USE OF INSULIN (HCC): ICD-10-CM

## 2022-08-31 DIAGNOSIS — E11.9 TYPE 2 DIABETES MELLITUS WITHOUT RETINOPATHY (HCC): ICD-10-CM

## 2022-08-31 DIAGNOSIS — H25.13 AGE-RELATED NUCLEAR CATARACT OF BOTH EYES: ICD-10-CM

## 2022-08-31 DIAGNOSIS — K57.90 DIVERTICULOSIS: ICD-10-CM

## 2022-08-31 DIAGNOSIS — I10 PRIMARY HYPERTENSION: ICD-10-CM

## 2022-08-31 DIAGNOSIS — Z86.711 HISTORY OF PULMONARY EMBOLISM: ICD-10-CM

## 2022-08-31 DIAGNOSIS — J44.9 CHRONIC OBSTRUCTIVE PULMONARY DISEASE, UNSPECIFIED COPD TYPE (HCC): ICD-10-CM

## 2022-08-31 PROCEDURE — 3044F HG A1C LEVEL LT 7.0%: CPT | Performed by: FAMILY MEDICINE

## 2022-08-31 PROCEDURE — 96160 PT-FOCUSED HLTH RISK ASSMT: CPT | Performed by: FAMILY MEDICINE

## 2022-08-31 PROCEDURE — 99397 PER PM REEVAL EST PAT 65+ YR: CPT | Performed by: FAMILY MEDICINE

## 2022-08-31 PROCEDURE — G0439 PPPS, SUBSEQ VISIT: HCPCS | Performed by: FAMILY MEDICINE

## 2022-08-31 PROCEDURE — 1125F AMNT PAIN NOTED PAIN PRSNT: CPT | Performed by: FAMILY MEDICINE

## 2022-08-31 PROCEDURE — 3061F NEG MICROALBUMINURIA REV: CPT | Performed by: FAMILY MEDICINE

## 2022-08-31 PROCEDURE — 3074F SYST BP LT 130 MM HG: CPT | Performed by: FAMILY MEDICINE

## 2022-08-31 PROCEDURE — 3078F DIAST BP <80 MM HG: CPT | Performed by: FAMILY MEDICINE

## 2022-08-31 PROCEDURE — 3008F BODY MASS INDEX DOCD: CPT | Performed by: FAMILY MEDICINE

## 2022-08-31 RX ORDER — BLOOD SUGAR DIAGNOSTIC
STRIP MISCELLANEOUS
Qty: 100 STRIP | Refills: 1 | Status: SHIPPED | OUTPATIENT
Start: 2022-08-31

## 2022-08-31 RX ORDER — LANCETS
EACH MISCELLANEOUS
Qty: 90 EACH | Refills: 3 | Status: SHIPPED | OUTPATIENT
Start: 2022-08-31

## 2022-09-01 ENCOUNTER — HOSPITAL ENCOUNTER (OUTPATIENT)
Dept: MAMMOGRAPHY | Age: 72
Discharge: HOME | End: 2022-09-01
Attending: FAMILY MEDICINE
Payer: MEDICARE

## 2022-09-01 DIAGNOSIS — Z12.31 ENCOUNTER FOR MAMMOGRAM TO ESTABLISH BASELINE MAMMOGRAM: ICD-10-CM

## 2022-09-01 LAB
ABSOLUTE BASOPHILS: 38 CELLS/UL (ref 0–200)
ABSOLUTE EOSINOPHILS: 428 CELLS/UL (ref 15–500)
ABSOLUTE LYMPHOCYTES: 3867 CELLS/UL (ref 850–3900)
ABSOLUTE MONOCYTES: 599 CELLS/UL (ref 200–950)
ABSOLUTE NEUTROPHILS: 4570 CELLS/UL (ref 1500–7800)
ALBUMIN/GLOBULIN RATIO: 1.5 (CALC) (ref 1–2.5)
ALBUMIN: 4.4 G/DL (ref 3.6–5.1)
ALKALINE PHOSPHATASE: 80 U/L (ref 37–153)
ALT: 18 U/L (ref 6–29)
AST: 19 U/L (ref 10–35)
BASOPHILS: 0.4 %
BILIRUBIN, TOTAL: 0.5 MG/DL (ref 0.2–1.2)
BUN: 16 MG/DL (ref 7–25)
CALCIUM: 9.9 MG/DL (ref 8.6–10.4)
CARBON DIOXIDE: 28 MMOL/L (ref 20–32)
CHLORIDE: 103 MMOL/L (ref 98–110)
CHOL/HDLC RATIO: 3.3 (CALC)
CHOLESTEROL, TOTAL: 160 MG/DL
CREATININE, RANDOM URINE: 54 MG/DL (ref 20–275)
CREATININE: 0.81 MG/DL (ref 0.6–1)
EGFR: 77 ML/MIN/1.73M2
EOSINOPHILS: 4.5 %
GLOBULIN: 3 G/DL (CALC) (ref 1.9–3.7)
GLUCOSE: 115 MG/DL (ref 65–99)
HDL CHOLESTEROL: 49 MG/DL
HEMATOCRIT: 42.7 % (ref 35–45)
HEMOGLOBIN A1C: 6.7 % OF TOTAL HGB
HEMOGLOBIN: 14.1 G/DL (ref 11.7–15.5)
LDL-CHOLESTEROL: 94 MG/DL (CALC)
LYMPHOCYTES: 40.7 %
MCH: 27.6 PG (ref 27–33)
MCHC: 33 G/DL (ref 32–36)
MCV: 83.6 FL (ref 80–100)
MICROALBUMIN: <0.2 MG/DL
MONOCYTES: 6.3 %
MPV: 10.5 FL (ref 7.5–12.5)
NEUTROPHILS: 48.1 %
NON-HDL CHOLESTEROL: 111 MG/DL (CALC)
PLATELET COUNT: 278 THOUSAND/UL (ref 140–400)
POTASSIUM: 4.5 MMOL/L (ref 3.5–5.3)
PROTEIN, TOTAL: 7.4 G/DL (ref 6.1–8.1)
RDW: 14.1 % (ref 11–15)
RED BLOOD CELL COUNT: 5.11 MILLION/UL (ref 3.8–5.1)
SODIUM: 140 MMOL/L (ref 135–146)
TRIGLYCERIDES: 79 MG/DL
TSH: 3.1 MIU/L (ref 0.4–4.5)
WHITE BLOOD CELL COUNT: 9.5 THOUSAND/UL (ref 3.8–10.8)

## 2022-09-01 PROCEDURE — 77063 BREAST TOMOSYNTHESIS BI: CPT | Performed by: FAMILY MEDICINE

## 2022-09-01 PROCEDURE — 77067 SCR MAMMO BI INCL CAD: CPT | Performed by: FAMILY MEDICINE

## 2022-09-22 RX ORDER — OMEPRAZOLE 20 MG/1
20 CAPSULE, DELAYED RELEASE ORAL EVERY MORNING
Qty: 90 CAPSULE | Refills: 1 | Status: SHIPPED | OUTPATIENT
Start: 2022-09-22

## 2022-09-22 RX ORDER — ALBUTEROL SULFATE 90 UG/1
2 AEROSOL, METERED RESPIRATORY (INHALATION) EVERY 4 HOURS PRN
Qty: 1 EACH | Refills: 2 | Status: SHIPPED | OUTPATIENT
Start: 2022-09-22

## 2022-09-22 NOTE — TELEPHONE ENCOUNTER
Refill passed per [address] protocol. Requested Prescriptions   Pending Prescriptions Disp Refills    omeprazole 20 MG Oral Capsule Delayed Release 90 capsule 1     Sig: Take 1 capsule (20 mg total) by mouth every morning. Gastrointestional Medication Protocol Passed - 9/21/2022 11:23 AM        Passed - In person appointment or virtual visit in the past 12 mos or appointment in next 3 mos       Recent Outpatient Visits              3 weeks ago Encounter for annual health examination    [address], [place], [place]    Office Visit    5 months ago Closed fracture of transverse process of lumbar vertebra with routine healing, subsequent encounter    [address], [address], [place], [place]    Office Visit    6 months ago Chest pressure    [address], [place], [place]    Office Visit    8 months ago Primary hypertension    [address], [address], [place], [place]    Office Visit    8 months ago Acute right eye pain    [address], [address], [place], [place], APRN    Telemedicine                    albuterol (PROAIR HFA) 108 (90 Base) MCG/ACT Inhalation Aero Soln 1 each 2     Sig: Inhale 2 puffs into the lungs every 4 (four) hours as needed for Wheezing.  inhale 2 puff by inhalation route  every 4 - 6 hours as needed        Asthma & COPD Medication Protocol Passed - 9/21/2022 11:23 AM        Passed - In person appointment or virtual visit in the past 6 mos or appointment in next 3 mos       Recent Outpatient Visits              3 weeks ago Encounter for annual health examination    [address], [place], [place]    Office Visit    5 months ago Closed fracture of transverse process of lumbar vertebra with routine healing, subsequent encounter    [address], [place], [place]    Office Visit    6 months ago Chest pressure [address], [place], [place]    Office Visit    8 months ago Primary hypertension    [facility], [facility], [place], [place]    Office Visit    8 months ago Acute right eye pain    [facility], [address] [place], [facility]                    nystatin 100,000 Units/g External Cream 30 g 3     Sig: Apply 1 Application topically 2 (two) times daily.         There is no refill protocol information for this order         Recent Outpatient Visits              3 weeks ago Encounter for annual health examination    [facility], [address], [place], [place]    Office Visit    5 months ago Closed fracture of transverse process of lumbar vertebra with routine healing, subsequent encounter    [facility], [facility], [place], [place]    Office Visit    6 months ago Chest pressure    [address], [place], [place], [place]    Office Visit    8 months ago Primary hypertension    [facility], [facility], [place], [place]    Office Visit    8 months ago Acute right eye pain    [facility], [address] [place] No significant past surgical history

## 2022-09-22 NOTE — TELEPHONE ENCOUNTER
Protocol failed or has No Protocol, please review  Requested Prescriptions   Pending Prescriptions Disp Refills    nystatin 100,000 Units/g External Cream 30 g 3     Sig: Apply 1 Application topically 2 (two) times daily. There is no refill protocol information for this order       Signed Prescriptions Disp Refills    omeprazole 20 MG Oral Capsule Delayed Release 90 capsule 1     Sig: Take 1 capsule (20 mg total) by mouth every morning. Gastrointestional Medication Protocol Passed - 9/21/2022 11:23 AM        Passed - In person appointment or virtual visit in the past 12 mos or appointment in next 3 mos       Recent Outpatient Visits              3 weeks ago Encounter for annual health examination    [address]    Office Visit    5 months ago Closed fracture of transverse process of lumbar vertebra with routine healing, subsequent encounter    [address]    Office Visit    6 months ago Chest pressure    [address] [place], [place]    Office Visit    8 months ago Primary hypertension    [facility] [address]    Office Visit    8 months ago Acute right eye pain    [facility], [facility], [place], [place], APRN    Telemedicine                    albuterol (PROAIR HFA) 108 (90 Base) MCG/ACT Inhalation Aero Soln 1 each 2     Sig: Inhale 2 puffs into the lungs every 4 (four) hours as needed for Wheezing.  inhale 2 puff by inhalation route  every 4 - 6 hours as needed        Asthma & COPD Medication Protocol Passed - 9/21/2022 11:23 AM        Passed - In person appointment or virtual visit in the past 6 mos or appointment in next 3 mos       Recent Outpatient Visits              3 weeks ago Encounter for annual health examination    [address]    Office Visit    5 months ago Closed fracture of transverse process of lumbar vertebra with routine healing, subsequent encounter    [facility], [facility], [address], [place], [place], [place]    Office Visit    6 months ago Chest pressure    [address], [place], [place], [place]    Office Visit    8 months ago Primary hypertension    [facility], [facility], [address], [place], [place], [place]    Office Visit    8 months ago Acute right eye pain    [facility], [facility], [address] [place] [address] Outpatient Visits              3 weeks ago Encounter for annual health examination    [facility], [address], [place], [place], [place]    Office Visit    5 months ago Closed fracture of transverse process of lumbar vertebra with routine healing, subsequent encounter    [facility], [facility], [address], [place], [place], DO    Office Visit    6 months ago Chest pressure    [address], [place], [place], [place]    Office Visit    8 months ago Primary hypertension    [facility], [facility], [address], [place], [place], [place]    Office Visit    8 months ago Acute right eye pain    [facility], [facility], [address] [place] [address]

## 2022-09-23 RX ORDER — NYSTATIN 100000 U/G
1 CREAM TOPICAL 2 TIMES DAILY
Qty: 30 G | Refills: 3 | Status: SHIPPED | OUTPATIENT
Start: 2022-09-23

## 2022-09-23 RX ORDER — NYSTATIN 100000 U/G
1 CREAM TOPICAL 2 TIMES DAILY
Qty: 30 G | Refills: 3 | OUTPATIENT
Start: 2022-09-23

## 2022-09-28 RX ORDER — LISINOPRIL 10 MG/1
10 TABLET ORAL DAILY
Qty: 90 TABLET | Refills: 3 | Status: SHIPPED | OUTPATIENT
Start: 2022-09-28

## 2022-09-28 RX ORDER — LISINOPRIL 5 MG/1
5 TABLET ORAL DAILY
Qty: 90 TABLET | Refills: 3 | Status: SHIPPED | OUTPATIENT
Start: 2022-09-28

## 2022-09-28 NOTE — TELEPHONE ENCOUNTER
Please review. Protocol failed or has no protocol. Requested Prescriptions   Pending Prescriptions Disp Refills    lisinopril 10 MG Oral Tab 90 tablet 3     Sig: Take 1 tablet (10 mg total) by mouth daily. Hypertensive Medications Protocol Failed - 9/28/2022  6:33 PM        Failed - EGFRCR or GFRAA > 50     GFR Evaluation              Passed - In person appointment in the past 12 or next 3 months       Recent Outpatient Visits              4 weeks ago Encounter for annual health examination    [facility], [place] [address], [place], [place]    Office Visit    5 months ago Closed fracture of transverse process of lumbar vertebra with routine healing, subsequent encounter    [facility], [place] [address], [place], [place]    Office Visit    6 months ago Chest pressure    [address] [place], [place]    Office Visit    8 months ago Primary hypertension    [facility], [place] [address], [place], [place]    Office Visit    9 months ago Acute right eye pain    [facility], [address], [place], APRN    Telemedicine                 Passed - Last BP reading less than 140/90     BP Readings from Last 1 Encounters:  08/31/22 : 122/70                Passed - CMP or BMP in past 6 months     Recent Results (from the past 4392 hour(s))   COMP METABOLIC PANEL (14)    Collection Time: 08/31/22  1:45 PM   Result Value Ref Range    GLUCOSE 115 (H) 65 - 99 mg/dL     Comment:               Fasting reference interval     For someone without known diabetes, a glucose value  between 100 and 125 mg/dL is consistent with  prediabetes and should be confirmed with a  follow-up test.         UREA NITROGEN (BUN) 16 7 - 25 mg/dL    CREATININE 0.81 0.60 - 1.00 mg/dL    EGFR 77 > OR = 60 mL/min/1.73m2     Comment: The eGFR is based on the CKD-EPI 2021 equation.  To calculate   the new eGFR from a previous Creatinine or Cystatin C  result, go to [email or web address]  [email or web address]      BUN/CREATININE RATIO NOT APPLICABLE 6 - 22 (calc)    SODIUM 140 135 - 146 mmol/L    POTASSIUM 4.5 3.5 - 5.3 mmol/L    CHLORIDE 103 98 - 110 mmol/L    CARBON DIOXIDE 28 20 - 32 mmol/L    CALCIUM 9.9 8.6 - 10.4 mg/dL    PROTEIN, TOTAL 7.4 6.1 - 8.1 g/dL    ALBUMIN 4.4 3.6 - 5.1 g/dL    GLOBULIN 3.0 1.9 - 3.7 g/dL (calc)    ALBUMIN/GLOBULIN RATIO 1.5 1.0 - 2.5 (calc)    BILIRUBIN, TOTAL 0.5 0.2 - 1.2 mg/dL    ALKALINE PHOSPHATASE 80 37 - 153 U/L    AST 19 10 - 35 U/L    ALT 18 6 - 29 U/L     *Note: Due to a large number of results and/or encounters for the requested time period, some results have not been displayed. A complete set of results can be found in Results Review. Passed - In person appointment or virtual visit in the past 6 months       Recent Outpatient Visits              4 weeks ago Encounter for annual health examination    [facility], [place] [address], [place], [place]    Office Visit    5 months ago Closed fracture of transverse process of lumbar vertebra with routine healing, subsequent encounter    [facility], [facility], [place], [place]    Office Visit    6 months ago Chest pressure    [facility], [place] [address], [place], [place]    Office Visit    8 months ago Primary hypertension    [facility], [place] [address], [place], [place]    Office Visit    9 months ago Acute right eye pain    [facility], [address] [place], [place], APRN    Telemedicine                    lisinopril 5 MG Oral Tab 90 tablet 3     Sig: Take 1 tablet (5 mg total) by mouth daily.  Taking 15mg total        Hypertensive Medications Protocol Failed - 9/28/2022  6:33 PM        Failed - EGFRCR or GFRAA > 50     GFR Evaluation              Passed - In person appointment in the past 12 or next 3 months       Recent Outpatient Visits              4 weeks ago Encounter for annual health examination    [address] [place], [place]    Office Visit    5 months ago Closed fracture of transverse process of lumbar vertebra with routine healing, subsequent encounter    [address] [place], [place]    Office Visit    6 months ago Chest pressure    [address],     Office Visit    8 months ago Primary hypertension    [address] [place], [place]    Office Visit    9 months ago Acute right eye pain    [address], [address], [place], [place], APRN    Telemedicine                 Passed - Last BP reading less than 140/90     BP Readings from Last 1 Encounters:  08/31/22 : 122/70                Passed - CMP or BMP in past 6 months     Recent Results (from the past 4392 hour(s))   COMP METABOLIC PANEL (14)    Collection Time: 08/31/22  1:45 PM   Result Value Ref Range    GLUCOSE 115 (H) 65 - 99 mg/dL     Comment:               Fasting reference interval     For someone without known diabetes, a glucose value  between 100 and 125 mg/dL is consistent with  prediabetes and should be confirmed with a  follow-up test.         UREA NITROGEN (BUN) 16 7 - 25 mg/dL    CREATININE 0.81 0.60 - 1.00 mg/dL    EGFR 77 > OR = 60 mL/min/1.73m2     Comment: The eGFR is based on the CKD-EPI 2021 equation. To calculate   the new eGFR from a previous Creatinine or Cystatin C  result, go to [email or web address]/  [email or web address]      BUN/CREATININE RATIO NOT APPLICABLE 6 - 22 (calc)    SODIUM 140 135 - 146 mmol/L    POTASSIUM 4.5 3.5 - 5.3 mmol/L    CHLORIDE 103 98 - 110 mmol/L    CARBON DIOXIDE 28 20 - 32 mmol/L    CALCIUM 9.9 8.6 - 10.4 mg/dL    PROTEIN, TOTAL 7.4 6.1 - 8.1 g/dL    ALBUMIN 4.4 3.6 - 5.1 g/dL    GLOBULIN 3.0 1.9 - 3.7 g/dL (calc)    ALBUMIN/GLOBULIN RATIO 1.5 1.0 - 2.5 (calc)    BILIRUBIN, TOTAL 0.5 0.2 - 1.2 mg/dL    ALKALINE PHOSPHATASE 80 37 - 153 U/L    AST 19 10 - 35 U/L    ALT 18 6 - 29 U/L     *Note: Due to a large number of results and/or encounters for the requested time period, some results have not been displayed. A complete set of results can be found in Results Review.                  Passed - In person appointment or virtual visit in the past 6 months       Recent Outpatient Visits              4 weeks ago Encounter for annual health examination    [facility], [place], [place], [place], [place]    Office Visit    5 months ago Closed fracture of transverse process of lumbar vertebra with routine healing, subsequent encounter    [facility], [address], [place], [place], [place]    Office Visit    6 months ago Chest pressure    [address], [place], [place], [place]    Office Visit    8 months ago Primary hypertension    [facility], [address], [place], [place], [place]    Office Visit    9 months ago Acute right eye pain    [facility], [address], [email or web address], [address] [facility], [address] [place] [address] Outpatient Visits              4 weeks ago Encounter for annual health examination    [facility], [address], [place], [place], [place]    Office Visit    5 months ago Closed fracture of transverse process of lumbar vertebra with routine healing, subsequent encounter    [facility], [place], [place], [place], [place]    Office Visit    6 months ago Chest pressure    [facility], [place], [place], [place], DO    Office Visit    8 months ago Primary hypertension    [facility], [address], [place], [place], [place]    Office Visit    9 months ago Acute right eye pain    [facility], [address], [email or web address], [address]

## 2022-09-30 PROBLEM — E66.01 MORBID (SEVERE) OBESITY DUE TO EXCESS CALORIES (HCC): Status: RESOLVED | Noted: 2022-08-31 | Resolved: 2022-09-30

## 2022-09-30 PROBLEM — H43.393 VITREOUS FLOATERS OF BOTH EYES: Status: RESOLVED | Noted: 2018-12-13 | Resolved: 2022-09-30

## 2022-10-11 NOTE — TELEPHONE ENCOUNTER
Refill passed per [facility] protocol.     Refill Protocol Appointment Criteria  · Appointment scheduled in the past 6 months or in the next 3 months  Recent Outpatient Visits            1 month ago Type 2 diabetes mellitus without complication, withou No indicators present

## 2022-10-20 ENCOUNTER — NURSE TRIAGE (OUTPATIENT)
Dept: FAMILY MEDICINE CLINIC | Facility: CLINIC | Age: 72
End: 2022-10-20

## 2022-10-20 ENCOUNTER — HOSPITAL ENCOUNTER (OUTPATIENT)
Age: 72
Discharge: HOME | End: 2022-10-20
Payer: MEDICARE

## 2022-10-20 VITALS
DIASTOLIC BLOOD PRESSURE: 72 MMHG | SYSTOLIC BLOOD PRESSURE: 132 MMHG | TEMPERATURE: 99 F | RESPIRATION RATE: 20 BRPM | OXYGEN SATURATION: 100 % | HEART RATE: 87 BPM

## 2022-10-20 DIAGNOSIS — J01.10 ACUTE NON-RECURRENT FRONTAL SINUSITIS: Primary | ICD-10-CM

## 2022-10-20 DIAGNOSIS — J45.21 MILD INTERMITTENT ASTHMA WITH EXACERBATION: ICD-10-CM

## 2022-10-20 LAB — SARS-COV-2 RNA RESP QL NAA+PROBE: NOT DETECTED

## 2022-10-20 PROCEDURE — U0002 COVID-19 LAB TEST NON-CDC: HCPCS | Performed by: NURSE PRACTITIONER

## 2022-10-20 PROCEDURE — 99214 OFFICE O/P EST MOD 30 MIN: CPT | Performed by: NURSE PRACTITIONER

## 2022-10-20 RX ORDER — PREDNISONE 20 MG/1
40 TABLET ORAL ONCE
Status: COMPLETED | OUTPATIENT
Start: 2022-10-20 | End: 2022-10-20

## 2022-10-20 RX ORDER — PREDNISONE 20 MG/1
20 TABLET ORAL 2 TIMES DAILY
Qty: 8 TABLET | Refills: 0 | Status: SHIPPED | OUTPATIENT
Start: 2022-10-21 | End: 2022-10-25

## 2022-10-20 RX ORDER — DOXYCYCLINE HYCLATE 100 MG/1
100 CAPSULE ORAL 2 TIMES DAILY
Qty: 20 CAPSULE | Refills: 0 | Status: SHIPPED | OUTPATIENT
Start: 2022-10-20 | End: 2022-10-30

## 2022-10-31 ENCOUNTER — NURSE TRIAGE (OUTPATIENT)
Dept: FAMILY MEDICINE CLINIC | Facility: CLINIC | Age: 72
End: 2022-10-31

## 2022-10-31 DIAGNOSIS — R42 VERTIGO: Primary | ICD-10-CM

## 2022-11-01 RX ORDER — MECLIZINE HYDROCHLORIDE 25 MG/1
25 TABLET ORAL 2 TIMES DAILY PRN
Qty: 60 TABLET | Refills: 0 | Status: SHIPPED | OUTPATIENT
Start: 2022-11-01

## 2022-11-03 NOTE — TELEPHONE ENCOUNTER
Patient stated that taking the meclizine but not helping with the vertigo. Wanted to know if could put in the referral to physical therapy.

## 2022-11-08 ENCOUNTER — TELEPHONE (OUTPATIENT)
Dept: FAMILY MEDICINE CLINIC | Facility: CLINIC | Age: 72
End: 2022-11-08

## 2022-11-08 NOTE — TELEPHONE ENCOUNTER
Patient continues with lingering symptoms. C/o vertigo, some congestion. Went to IC 10/20/22  Doxycycline 100mg for 10 day; prednisone. Finished treatment. Patient occasionally feels sharp RT ear pain \"not steady pain\". Vertigo continues. She take meclizine. Patient also noticed wheezing once she completed prednisone. She takes albuterol and advair. Patient is stable. No sob. APPT advised today/or tomorrow. No available appts at OP. Patient preferred to wait to see Dr Ambar Ordoñez. She is stable and appt given 11/10/22. However patient to call back tomorrow if symptoms progressively worsen. Will need to be seen sooner for re-evaluation. Patient verbalized understanding.

## 2022-12-08 ENCOUNTER — APPOINTMENT (OUTPATIENT)
Dept: PHYSICAL THERAPY | Facility: HOSPITAL | Age: 72
End: 2022-12-08
Attending: FAMILY MEDICINE
Payer: MEDICARE

## 2022-12-14 RX ORDER — FLUTICASONE PROPIONATE 50 MCG
2 SPRAY, SUSPENSION (ML) NASAL DAILY
Qty: 3 EACH | Refills: 1 | Status: SHIPPED | OUTPATIENT
Start: 2022-12-14

## 2022-12-14 NOTE — TELEPHONE ENCOUNTER
Refill passed per [address] protocol. Requested Prescriptions   Pending Prescriptions Disp Refills    fluticasone propionate (FLONASE) 50 MCG/ACT Nasal Suspension 3 each 1     Si sprays by Nasal route daily.        Allergy Medication Protocol Passed - 2022 12:10 PM        Passed - In person appointment or virtual visit in the past 12 mos or appointment in next 3 mos     Recent Outpatient Visits              3 months ago Encounter for annual health examination    [address], [address], [place], [place], [place]    Office Visit    8 months ago Closed fracture of transverse process of lumbar vertebra with routine healing, subsequent encounter    [address], [address], [place], [place], [place]    Office Visit    9 months ago Chest pressure    [address], [address], [place], [place], [place]    Office Visit    11 months ago Primary hypertension    [address], [address], [place], [place], [place]    Office Visit    11 months ago Acute right eye pain    [address], [address], [place], [address] [facility], [address] [place] [address] Outpatient Visits              3 months ago Encounter for annual health examination    [address], [address], [place], [place], [place]    Office Visit    8 months ago Closed fracture of transverse process of lumbar vertebra with routine healing, subsequent encounter    [address], [address], [place], [place], DO    Office Visit    9 months ago Chest pressure    [address], [address], [place], [place], [place]    Office Visit    11 months ago Primary hypertension    [address], [address], [place], [place], [place]    Office Visit    11 months ago Acute right eye pain    [address], [address], [place], [address] [facility], [address] [place] [address]

## 2022-12-15 ENCOUNTER — APPOINTMENT (OUTPATIENT)
Dept: PHYSICAL THERAPY | Facility: HOSPITAL | Age: 72
End: 2022-12-15
Attending: FAMILY MEDICINE
Payer: MEDICARE

## 2022-12-22 ENCOUNTER — APPOINTMENT (OUTPATIENT)
Dept: PHYSICAL THERAPY | Facility: HOSPITAL | Age: 72
End: 2022-12-22
Attending: FAMILY MEDICINE
Payer: MEDICARE

## 2022-12-29 ENCOUNTER — APPOINTMENT (OUTPATIENT)
Dept: PHYSICAL THERAPY | Facility: HOSPITAL | Age: 72
End: 2022-12-29
Attending: FAMILY MEDICINE
Payer: MEDICARE

## 2023-01-05 ENCOUNTER — APPOINTMENT (OUTPATIENT)
Dept: PHYSICAL THERAPY | Facility: HOSPITAL | Age: 73
End: 2023-01-05
Attending: FAMILY MEDICINE
Payer: MEDICARE

## 2023-01-11 ENCOUNTER — APPOINTMENT (OUTPATIENT)
Dept: PHYSICAL THERAPY | Facility: HOSPITAL | Age: 73
End: 2023-01-11
Attending: FAMILY MEDICINE
Payer: MEDICARE

## 2023-01-19 ENCOUNTER — APPOINTMENT (OUTPATIENT)
Dept: PHYSICAL THERAPY | Facility: HOSPITAL | Age: 73
End: 2023-01-19
Attending: FAMILY MEDICINE
Payer: MEDICARE

## 2023-01-20 ENCOUNTER — HOSPITAL ENCOUNTER (OUTPATIENT)
Age: 73
Discharge: HOME | End: 2023-01-20
Payer: MEDICARE

## 2023-01-20 VITALS
RESPIRATION RATE: 22 BRPM | TEMPERATURE: 97 F | SYSTOLIC BLOOD PRESSURE: 147 MMHG | DIASTOLIC BLOOD PRESSURE: 72 MMHG | OXYGEN SATURATION: 100 % | HEART RATE: 84 BPM

## 2023-01-20 DIAGNOSIS — J01.10 ACUTE NON-RECURRENT FRONTAL SINUSITIS: Primary | ICD-10-CM

## 2023-01-20 DIAGNOSIS — J40 BRONCHITIS: ICD-10-CM

## 2023-01-20 LAB
POCT INFLUENZA A: NEGATIVE
POCT INFLUENZA B: NEGATIVE
SARS-COV-2 RNA RESP QL NAA+PROBE: NOT DETECTED

## 2023-01-20 PROCEDURE — 99213 OFFICE O/P EST LOW 20 MIN: CPT | Performed by: NURSE PRACTITIONER

## 2023-01-20 PROCEDURE — 87502 INFLUENZA DNA AMP PROBE: CPT | Performed by: NURSE PRACTITIONER

## 2023-01-20 PROCEDURE — U0002 COVID-19 LAB TEST NON-CDC: HCPCS | Performed by: NURSE PRACTITIONER

## 2023-01-20 RX ORDER — PREDNISONE 20 MG/1
20 TABLET ORAL 2 TIMES DAILY
Qty: 10 TABLET | Refills: 0 | Status: SHIPPED | OUTPATIENT
Start: 2023-01-20 | End: 2023-01-25

## 2023-01-20 RX ORDER — DOXYCYCLINE HYCLATE 100 MG/1
100 CAPSULE ORAL 2 TIMES DAILY
Qty: 14 CAPSULE | Refills: 0 | Status: SHIPPED | OUTPATIENT
Start: 2023-01-20 | End: 2023-01-27

## 2023-01-26 ENCOUNTER — APPOINTMENT (OUTPATIENT)
Dept: PHYSICAL THERAPY | Facility: HOSPITAL | Age: 73
End: 2023-01-26
Attending: FAMILY MEDICINE
Payer: MEDICARE

## 2023-02-02 ENCOUNTER — APPOINTMENT (OUTPATIENT)
Dept: PHYSICAL THERAPY | Facility: HOSPITAL | Age: 73
End: 2023-02-02
Attending: FAMILY MEDICINE
Payer: MEDICARE

## 2023-02-07 ENCOUNTER — NURSE TRIAGE (OUTPATIENT)
Dept: FAMILY MEDICINE CLINIC | Facility: CLINIC | Age: 73
End: 2023-02-07

## 2023-02-09 ENCOUNTER — APPOINTMENT (OUTPATIENT)
Dept: PHYSICAL THERAPY | Facility: HOSPITAL | Age: 73
End: 2023-02-09
Attending: FAMILY MEDICINE
Payer: MEDICARE

## 2023-02-20 ENCOUNTER — APPOINTMENT (OUTPATIENT)
Dept: GENERAL RADIOLOGY | Age: 73
End: 2023-02-20
Attending: NURSE PRACTITIONER
Payer: MEDICARE

## 2023-02-20 ENCOUNTER — HOSPITAL ENCOUNTER (OUTPATIENT)
Age: 73
Discharge: HOME | End: 2023-02-20
Payer: MEDICARE

## 2023-02-20 ENCOUNTER — NURSE TRIAGE (OUTPATIENT)
Dept: FAMILY MEDICINE CLINIC | Facility: CLINIC | Age: 73
End: 2023-02-20

## 2023-02-20 VITALS
DIASTOLIC BLOOD PRESSURE: 61 MMHG | TEMPERATURE: 98 F | RESPIRATION RATE: 20 BRPM | SYSTOLIC BLOOD PRESSURE: 119 MMHG | HEART RATE: 98 BPM | OXYGEN SATURATION: 98 %

## 2023-02-20 DIAGNOSIS — R06.02 SHORTNESS OF BREATH: ICD-10-CM

## 2023-02-20 DIAGNOSIS — J45.41 MODERATE PERSISTENT ASTHMA WITH EXACERBATION: Primary | ICD-10-CM

## 2023-02-20 LAB — SARS-COV-2 RNA RESP QL NAA+PROBE: NOT DETECTED

## 2023-02-20 PROCEDURE — 94640 AIRWAY INHALATION TREATMENT: CPT | Performed by: NURSE PRACTITIONER

## 2023-02-20 PROCEDURE — 99213 OFFICE O/P EST LOW 20 MIN: CPT | Performed by: NURSE PRACTITIONER

## 2023-02-20 PROCEDURE — 71046 X-RAY EXAM CHEST 2 VIEWS: CPT | Performed by: NURSE PRACTITIONER

## 2023-02-20 PROCEDURE — U0002 COVID-19 LAB TEST NON-CDC: HCPCS | Performed by: NURSE PRACTITIONER

## 2023-02-20 RX ORDER — ALBUTEROL SULFATE 2.5 MG/3ML
2.5 SOLUTION RESPIRATORY (INHALATION) EVERY 4 HOURS PRN
Qty: 30 EACH | Refills: 0 | Status: SHIPPED | OUTPATIENT
Start: 2023-02-20 | End: 2023-03-22

## 2023-02-20 RX ORDER — PREDNISONE 20 MG/1
60 TABLET ORAL ONCE
Status: COMPLETED | OUTPATIENT
Start: 2023-02-20 | End: 2023-02-20

## 2023-02-20 RX ORDER — PREDNISONE 20 MG/1
40 TABLET ORAL DAILY
Qty: 10 TABLET | Refills: 0 | Status: SHIPPED | OUTPATIENT
Start: 2023-02-20 | End: 2023-02-25

## 2023-02-20 RX ORDER — IPRATROPIUM BROMIDE AND ALBUTEROL SULFATE 2.5; .5 MG/3ML; MG/3ML
3 SOLUTION RESPIRATORY (INHALATION) ONCE
Status: COMPLETED | OUTPATIENT
Start: 2023-02-20 | End: 2023-02-20

## 2023-02-20 RX ORDER — PRAVASTATIN SODIUM 10 MG
10 TABLET ORAL NIGHTLY
Qty: 90 TABLET | Refills: 1 | Status: SHIPPED | OUTPATIENT
Start: 2023-02-20

## 2023-02-20 RX ORDER — ALBUTEROL SULFATE 90 UG/1
2 AEROSOL, METERED RESPIRATORY (INHALATION) EVERY 4 HOURS PRN
Qty: 1 EACH | Refills: 0 | Status: SHIPPED | OUTPATIENT
Start: 2023-02-20 | End: 2023-03-22

## 2023-02-20 RX ORDER — ALBUTEROL SULFATE 2.5 MG/3ML
2.5 SOLUTION RESPIRATORY (INHALATION) ONCE
Status: COMPLETED | OUTPATIENT
Start: 2023-02-20 | End: 2023-02-20

## 2023-02-20 NOTE — ED INITIAL ASSESSMENT (HPI)
Pt states 1/20 was seen in IC was prescribed prednisone and doxycyline, pt states it made symptoms better but never went way. Pt states now symptoms are worse and also this weekend was around cats which shes allergic too and made symptoms worse then they already were. Pt denies fever. Pt states having a lot of congestion, SOB, and cough.

## 2023-02-20 NOTE — TELEPHONE ENCOUNTER
Refill passed per [place] [email or web address] protocol. Requested Prescriptions   Pending Prescriptions Disp Refills    pravastatin 10 MG Oral Tab 90 tablet 3     Sig: Take 1 tablet (10 mg total) by mouth nightly.        Cholesterol Medication Protocol Passed - 2/19/2023 12:47 PM        Passed - ALT in past 12 months        Passed - LDL in past 12 months        Passed - Last ALT < 80     Lab Results   Component Value Date    ALT 18 08/31/2022             Passed - Last LDL < 130     Lab Results   Component Value Date    LDL 94 08/31/2022             Passed - In person appointment or virtual visit in the past 12 mos or appointment in next 3 mos     Recent Outpatient Visits              5 months ago Encounter for annual health examination    [address] [place]    Office Visit    10 months ago Closed fracture of transverse process of lumbar vertebra with routine healing, subsequent encounter    [address] [facility]    Office Visit    11 months ago Chest pressure    [address]    Office Visit    1 year ago Primary hypertension    [facility], [address], [place]    Office Visit    1 year ago Acute right eye pain    [facility], [facility], [facility] Outpatient Visits              5 months ago Encounter for annual health examination    [place] [facility]    Office Visit    10 months ago Closed fracture of transverse process of lumbar vertebra with routine healing, subsequent encounter    [address]    Office Visit    11 months ago Chest pressure    [facility], [address], [place]    Office Visit    1 year ago Primary hypertension    [facility], [address], [place], [place]    Office Visit    1 year ago Acute right eye pain    [facility], [facility] [address] [place], [place], APRN    Telemedicine

## 2023-03-01 ENCOUNTER — OFFICE VISIT (OUTPATIENT)
Dept: FAMILY MEDICINE CLINIC | Facility: CLINIC | Age: 73
End: 2023-03-01

## 2023-03-01 VITALS
DIASTOLIC BLOOD PRESSURE: 62 MMHG | OXYGEN SATURATION: 93 % | BODY MASS INDEX: 37.03 KG/M2 | TEMPERATURE: 97 F | HEART RATE: 84 BPM | WEIGHT: 209 LBS | HEIGHT: 63 IN | SYSTOLIC BLOOD PRESSURE: 124 MMHG

## 2023-03-01 DIAGNOSIS — E11.9 TYPE 2 DIABETES MELLITUS WITHOUT RETINOPATHY (HCC): ICD-10-CM

## 2023-03-01 DIAGNOSIS — J44.9 ASTHMA WITH COPD (CHRONIC OBSTRUCTIVE PULMONARY DISEASE) (HCC): Chronic | ICD-10-CM

## 2023-03-01 DIAGNOSIS — Z09 FOLLOW-UP EXAM: Primary | ICD-10-CM

## 2023-03-01 DIAGNOSIS — R10.84 GENERALIZED ABDOMINAL PAIN: ICD-10-CM

## 2023-03-01 DIAGNOSIS — J45.21 MILD INTERMITTENT ASTHMA WITH EXACERBATION: ICD-10-CM

## 2023-03-01 LAB
CARTRIDGE LOT#: 30 NUMERIC
HEMOGLOBIN A1C: 6.7 % (ref 4.3–5.6)

## 2023-03-01 PROCEDURE — 3044F HG A1C LEVEL LT 7.0%: CPT

## 2023-03-01 PROCEDURE — 3074F SYST BP LT 130 MM HG: CPT

## 2023-03-01 PROCEDURE — 3078F DIAST BP <80 MM HG: CPT

## 2023-03-01 PROCEDURE — 3008F BODY MASS INDEX DOCD: CPT

## 2023-03-01 PROCEDURE — 83036 HEMOGLOBIN GLYCOSYLATED A1C: CPT

## 2023-03-01 PROCEDURE — 1126F AMNT PAIN NOTED NONE PRSNT: CPT

## 2023-03-01 PROCEDURE — 99214 OFFICE O/P EST MOD 30 MIN: CPT

## 2023-03-01 RX ORDER — PREDNISONE 20 MG/1
40 TABLET ORAL DAILY
Qty: 10 TABLET | Refills: 0 | Status: SHIPPED | OUTPATIENT
Start: 2023-03-01 | End: 2023-03-06

## 2023-03-01 NOTE — PATIENT INSTRUCTIONS
Go to the ER if you develop severe shortness of breath or chest pain  Do gentle stretches at home (See handouts). Use a tennis ball to help stretch the butt muscles. Avoid fatty foods, high citrus, and greasy food.

## 2023-03-16 RX ORDER — OMEPRAZOLE 20 MG/1
20 CAPSULE, DELAYED RELEASE ORAL EVERY MORNING
Qty: 90 CAPSULE | Refills: 3 | Status: SHIPPED | OUTPATIENT
Start: 2023-03-16

## 2023-03-16 NOTE — TELEPHONE ENCOUNTER
Refill passed per [facility] protocol.    Requested Prescriptions   Pending Prescriptions Disp Refills    OMEPRAZOLE 20 MG Oral Capsule Delayed Release [Pharmacy Med Name: Omeprazole Dr 20 Mg Cap Nort] 90 capsule 0     Sig: TAKE 1 CAPSULE BY MOUTH EVERY MORNING       Gastrointestional Medication Protocol Passed - 3/16/2023  1:31 AM        Passed - In person appointment or virtual visit in the past 12 mos or appointment in next 3 mos     Recent Outpatient Visits              2 weeks ago Follow-up exam    [address] [facility] NIKKIE [place]    Office Visit    6 months ago Encounter for annual health examination    [address] [facility], [place], [place]    Office Visit    11 months ago Closed fracture of transverse process of lumbar vertebra with routine healing, subsequent encounter    [address] [facility], [place], [place]    Office Visit    1 year ago Chest pressure    [address] [facility], [place], [place]    Office Visit    1 year ago Primary hypertension    [address], [facility], [place], [place]    Office Visit          Future Appointments         Provider Department Appt Notes    In 3 months Sangita Carvajal MD [address], [address], [facility], informed of policy                   Recent Outpatient Visits              2 weeks ago Follow-up exam    [address] [facility], [facility] NIKKIE Stevens    Office Visit    6 months ago Encounter for annual health examination    [address] [facility], [place], [place]    Office Visit    11 months ago Closed fracture of transverse process of lumbar vertebra with routine healing, subsequent encounter    [facility], [place], [place]    Office Visit    1 year ago Chest pressure    [facility], [address], [place], [place]    Office Visit    1 year ago Primary hypertension    [facility], [facility], [place], [place]    Office Visit           Future Appointments         Provider Department Appt Notes    In 3 months Cj Rios MD [address], [address], [facility], informed of policy

## 2023-04-06 RX ORDER — MECLIZINE HYDROCHLORIDE 25 MG/1
25 TABLET ORAL 2 TIMES DAILY PRN
Qty: 60 TABLET | Refills: 0 | Status: SHIPPED | OUTPATIENT
Start: 2023-04-06

## 2023-04-06 NOTE — TELEPHONE ENCOUNTER
Please review. Protocol failed/ No protocol. Requested Prescriptions   Pending Prescriptions Disp Refills    meclizine 25 MG Oral Tab 60 tablet 0     Sig: Take 1 tablet (25 mg total) by mouth 2 (two) times daily as needed.        Gastrointestional Medication Protocol Passed - 4/5/2023  1:11 PM        Passed - In person appointment or virtual visit in the past 12 mos or appointment in next 3 mos     Recent Outpatient Visits              1 month ago Follow-up exam    [address] [facility] NIKKIE Briones    Office Visit    7 months ago Encounter for annual health examination    [address] [place], [place]    Office Visit    11 months ago Closed fracture of transverse process of lumbar vertebra with routine healing, subsequent encounter    [address] [place], [place]    Office Visit    1 year ago Chest pressure    [address] [place], [place]    Office Visit    1 year ago Primary hypertension    [address], [address], [place], [place]    Office Visit          Future Appointments         Provider Department Appt Notes    In 2 months Christ Castillo MD [address], [address], [facility], informed of policy                    Recent Outpatient Visits              1 month ago Follow-up exam    [address] [facility] [address] NIKKIE Briones    Office Visit    7 months ago Encounter for annual health examination    [address], [address], [place], [place]    Office Visit    11 months ago Closed fracture of transverse process of lumbar vertebra with routine healing, subsequent encounter    [address] [place], [place]    Office Visit    1 year ago Chest pressure    [facility], [address], [facility]    Office Visit    1 year ago Primary hypertension    [facility], [facility], [place] [facility]    Office Visit            Future Appointments         Provider Department Appt Notes    In 2 months Christ Castillo MD [address], [address], [place] , informed of policy

## 2023-04-08 ENCOUNTER — HOSPITAL ENCOUNTER (OUTPATIENT)
Age: 73
Discharge: HOME | End: 2023-04-08
Payer: MEDICARE

## 2023-04-08 VITALS
OXYGEN SATURATION: 98 % | TEMPERATURE: 97 F | DIASTOLIC BLOOD PRESSURE: 61 MMHG | HEART RATE: 85 BPM | RESPIRATION RATE: 16 BRPM | SYSTOLIC BLOOD PRESSURE: 131 MMHG

## 2023-04-08 DIAGNOSIS — M54.40 BACK PAIN OF LUMBAR REGION WITH SCIATICA: Primary | ICD-10-CM

## 2023-04-08 PROCEDURE — 99213 OFFICE O/P EST LOW 20 MIN: CPT | Performed by: EMERGENCY MEDICINE

## 2023-04-08 RX ORDER — CYCLOBENZAPRINE HCL 5 MG
5 TABLET ORAL 3 TIMES DAILY PRN
Qty: 20 TABLET | Refills: 0 | Status: SHIPPED | OUTPATIENT
Start: 2023-04-08

## 2023-04-08 RX ORDER — IBUPROFEN 600 MG/1
600 TABLET ORAL EVERY 6 HOURS PRN
Qty: 20 TABLET | Refills: 0 | Status: SHIPPED | OUTPATIENT
Start: 2023-04-08

## 2023-04-08 RX ORDER — LIDOCAINE 4 G/G
1 PATCH TOPICAL EVERY 24 HOURS
Qty: 15 PATCH | Refills: 0 | Status: SHIPPED | OUTPATIENT
Start: 2023-04-08

## 2023-04-08 NOTE — ED INITIAL ASSESSMENT (HPI)
Pt presents with left low back x 1 month, recently radiating down left leg x 24 hours. No fall and no injury. Pain mostly when sitting.

## 2023-04-08 NOTE — DISCHARGE INSTRUCTIONS
Home care:     As soon as possible, start sitting up or walking. This will help you prevent problems that come from staying in bed for long periods. AVOID HEAT IN THE ACUTE PHASE. On day 4 you can use intermittent heat then follow up 20min later with ice. Heat will bring blood flow to the area, and using heat alone can causes inflammation. Don't sit for long periods. This puts more stress on your low back than standing or walking. When in bed, try to find a position of comfort. A firm mattress is best. Try lying flat on your back with pillows under your knees. You can also try lying on your side with your knees bent up toward your chest and a pillow between your knees. At first, don't try to stretch out the sore spots. If there is a strain, it's not like the good soreness you get after exercising without an injury. In this case, stretching may make it worse. Don't sit for long periods, as in a long car ride or during other travel. This puts more stress on the lower back than standing or walking. During the first 24 to 72 hours after an acute injury or flare up of chronic back pain, apply an ice pack to the painful area for 20 minutes and then remove it for 20 minutes. Do this over a period of 60 to 90 minutes or several times a day. This will reduce swelling and pain. Wrap the ice pack in a thin towel or plastic to protect your skin. You can start with ice, then switch to heat. Heat (hot shower, hot bath, or heating pad) reduces pain and works well for muscle spasms. Heat can be applied to the painful area for 20 minutes then remove it for 20 minutes. Do this over a period of 60 to 90 minutes or several times a day. Don't sleep on a heating pad. It can lead to skin burns or tissue damage. You can alternate ice and heat therapy after 3 days. Talk with your doctor about the best treatment for your back pain. Therapeutic massage can help relax the back muscles without stretching them.   Be aware of safe lifting methods.  Don't lift anything without stretching first

## 2023-04-13 ENCOUNTER — TELEPHONE (OUTPATIENT)
Dept: CASE MANAGEMENT | Age: 73
End: 2023-04-13

## 2023-04-13 DIAGNOSIS — M54.40 BACK PAIN OF LUMBAR REGION WITH SCIATICA: Primary | ICD-10-CM

## 2023-04-13 NOTE — TELEPHONE ENCOUNTER
Dr. Rah Carroll,     Patient is requesting a referral to Dr. Shazia Vaughan for a consult post UC visit for pain in back and leg. Pended referral please review diagnosis and sign off if you agree. Thank you.   Glynn Lazcano

## 2023-04-19 ENCOUNTER — OFFICE VISIT (OUTPATIENT)
Dept: FAMILY MEDICINE CLINIC | Facility: CLINIC | Age: 73
End: 2023-04-19

## 2023-04-19 VITALS
DIASTOLIC BLOOD PRESSURE: 75 MMHG | TEMPERATURE: 98 F | RESPIRATION RATE: 18 BRPM | SYSTOLIC BLOOD PRESSURE: 133 MMHG | HEART RATE: 86 BPM

## 2023-04-19 DIAGNOSIS — M54.32 LEFT SIDED SCIATICA: Primary | ICD-10-CM

## 2023-04-19 PROCEDURE — 99214 OFFICE O/P EST MOD 30 MIN: CPT | Performed by: FAMILY MEDICINE

## 2023-04-19 PROCEDURE — 1125F AMNT PAIN NOTED PAIN PRSNT: CPT | Performed by: FAMILY MEDICINE

## 2023-04-19 PROCEDURE — 3078F DIAST BP <80 MM HG: CPT | Performed by: FAMILY MEDICINE

## 2023-04-19 PROCEDURE — 3075F SYST BP GE 130 - 139MM HG: CPT | Performed by: FAMILY MEDICINE

## 2023-04-19 RX ORDER — METHYLPREDNISOLONE 4 MG/1
TABLET ORAL
Qty: 1 EACH | Refills: 0 | Status: SHIPPED | OUTPATIENT
Start: 2023-04-19

## 2023-04-20 NOTE — TELEPHONE ENCOUNTER
Labs are out of date range for protocol. Please advise on refill request.    Requested Prescriptions     Pending Prescriptions Disp Refills    metFORMIN 500 MG Oral Tab 180 tablet 3     Sig: Take 1 tablet (500 mg total) by mouth 2 (two) times daily with meals. Recent Visits  Date Type Provider Dept   04/19/23 Office Visit Evangelist Mahoney, DO [facility]   03/01/23 Office Visit NIKKIE Musa [facility]   08/31/22 Office Visit Evangelist Mahoney, DO [facility]   04/14/22 Office Visit Evangelist Mahoney, DO [facility]   03/10/22 Office Visit Evangelist Mahoney, DO [facility]   01/12/22 Office Visit Evangelist Mahoney, DO [facility]   12/14/21 Office Visit Evangelist Mahoney DO [facility]   Showing recent visits within past 540 days with a meds authorizing provider and meeting all other requirements  Future Appointments  Date Type Provider Dept   06/14/23 Appointment Evangelist Mahoney DO [facility]   Showing future appointments within next 150 days with a meds authorizing provider and meeting all other requirements     Requested Prescriptions   Pending Prescriptions Disp Refills    metFORMIN 500 MG Oral Tab 180 tablet 3     Sig: Take 1 tablet (500 mg total) by mouth 2 (two) times daily with meals.        Diabetes Medication Protocol Failed - 4/19/2023  6:06 PM        Failed - GFR in the past 12 months        Passed - Last A1C < 7.5 and within past 6 months     Lab Results   Component Value Date    A1C 6.7 (A) 03/01/2023               Passed - In person appointment or virtual visit in the past 6 mos or appointment in next 3 mos     Recent Outpatient Visits              Yesterday Left sided sciatica    [facility], [address], [place], [place], [place]    Office Visit    1 month ago Follow-up exam    [address] NIKKIE Musa    Office Visit    7 months ago Encounter for [facility] examination    [facility], [address] [facility], [place], [place]    Office Visit    1 year ago Closed fracture of transverse process of lumbar vertebra with routine healing, subsequent encounter    Nemo Driscoll [place], [place]    Office Visit    1 year ago Chest pressure    Nemo Driscoll [place], [place]    Office Visit          Future Appointments         Provider Department Appt Notes    In 1 week Lynne Hartmann MD [address], [address] ref by JEAN Abdi pain    In 3 weeks HND ADAMA RM1; HND DEXA [address]     In 1 month [address], [address] in [place]    In 1 month [address], [address] in [place]    In 1 month [address], [address] in [place]    In 1 month [address], [address] in [place]    In 1 month Wes Johnson, [address], [address], [place] px, last px; 08/31/22    In 1 month [address], [address] in [place]    In 1 month [address] [facility], [address] in [place]    In 2 months Vianca Keane MD [address], [address], [facility], informed of policy               Passed - EGFRCR or GFRAA > 50     GFR Evaluation                  Future Appointments         Provider Department Appt Notes    In 1 week Lynne Hartmann MD [address], [address] ref by JEAN Abdi pain    In 3 weeks HND ADAMA RM1; HND DEXA [address]     In 1 month [address], [address] in [place]    In 1 month [address], [address] in [place]    In 1 month [address], [address] in [place] In 1 month Anita [address] in [place]    In 1 month Chano Canela, [address], [facility] px, last px; 08/31/22    In 1 month Anita, [address] in [place]    In 1 month [facility] [facility], [address] in [place]    In 2 months MD Lane Meek, [address] [facility],[address], [facility], informed of policy           Recent Outpatient Visits              Yesterday Left sided sciatica    [address] [facility], [place]    Office Visit    1 month ago Follow-up exam    [address] [facility], [facility] NIKKIE Restrepo    Office Visit    7 months ago Encounter for annual health examination    [address] [facility], [place]    Office Visit    1 year ago Closed fracture of transverse process of lumbar vertebra with routine healing, subsequent encounter    Alysa Wadsworth, [place] [facility]    Office Visit    1 year ago Chest pressure    [address] [facility], [place] [facility]    Office Visit

## 2023-05-04 ENCOUNTER — MED REC SCAN ONLY (OUTPATIENT)
Dept: FAMILY MEDICINE CLINIC | Facility: CLINIC | Age: 73
End: 2023-05-04

## 2023-05-11 ENCOUNTER — TELEPHONE (OUTPATIENT)
Dept: PHYSICAL THERAPY | Facility: HOSPITAL | Age: 73
End: 2023-05-11

## 2023-05-30 ENCOUNTER — APPOINTMENT (OUTPATIENT)
Dept: PHYSICAL THERAPY | Age: 73
End: 2023-05-30
Attending: FAMILY MEDICINE
Payer: MEDICARE

## 2023-06-01 ENCOUNTER — APPOINTMENT (OUTPATIENT)
Dept: PHYSICAL THERAPY | Age: 73
End: 2023-06-01
Attending: FAMILY MEDICINE
Payer: MEDICARE

## 2023-06-07 ENCOUNTER — APPOINTMENT (OUTPATIENT)
Dept: PHYSICAL THERAPY | Age: 73
End: 2023-06-07
Attending: FAMILY MEDICINE
Payer: MEDICARE

## 2023-06-09 ENCOUNTER — APPOINTMENT (OUTPATIENT)
Dept: PHYSICAL THERAPY | Age: 73
End: 2023-06-09
Attending: FAMILY MEDICINE
Payer: MEDICARE

## 2023-06-10 RX ORDER — FLUTICASONE PROPIONATE AND SALMETEROL 250; 50 UG/1; UG/1
POWDER RESPIRATORY (INHALATION)
Qty: 1 EACH | Refills: 2 | Status: SHIPPED | OUTPATIENT
Start: 2023-06-10

## 2023-06-10 RX ORDER — METHYLPREDNISOLONE 4 MG/1
TABLET ORAL
Qty: 1 EACH | Refills: 0 | Status: CANCELLED | OUTPATIENT
Start: 2023-06-10

## 2023-06-10 NOTE — TELEPHONE ENCOUNTER
Please review; protocol failed/No Protcol    Requested Prescriptions   Pending Prescriptions Disp Refills    methylPREDNISolone (MEDROL) 4 MG Oral Tablet Therapy Pack 1 each 0     Sig: As directed.        There is no refill protocol information for this order      Signed Prescriptions Disp Refills    fluticasone-salmeterol 250-50 MCG/ACT Inhalation Aerosol Powder, Breath Activated 1 each 2     Sig: INHALE ONE PUFF BY MOUTH EVERY 12 HOURS       Asthma & COPD Medication Protocol Passed - 6/9/2023 10:02 AM        Passed - In person appointment or virtual visit in the past 6 mos or appointment in next 3 mos     Recent Outpatient Visits              1 month ago Left sided sciatica    [address] [facility], [place]    Office Visit    3 months ago Follow-up exam    [facility] [address] [facility], [facility] NIKKIE Arce    Office Visit    9 months ago Encounter for annual health examination    [facility] [address] [facility], [place], [facility]    Office Visit    1 year ago Closed fracture of transverse process of lumbar vertebra with routine healing, subsequent encounter    [facility], [place]    Office Visit    1 year ago Chest pressure    [address] [facility], [place]    Office Visit          Future Appointments         Provider Department Appt Notes    In 4 days Judd Ibarra DO [address], [address], [facility] px, last px; 08/31/22    In 1 week Juany Alvarado MD [address] [facility], informed of policy    In 1 month [address] [place] [address]                     Recent Outpatient Visits              1 month ago Left sided sciatica    [address] [facility] [place]    Office Visit    3 months ago Follow-up exam    Elías Friend, [facility] NIKKIE Smyth    Office Visit    9 months ago Encounter for annual health examination    Nemo Gil [place], [place]    Office Visit    1 year ago Closed fracture of transverse process of lumbar vertebra with routine healing, subsequent encounter    Nemo Gil [place], [place]    Office Visit    1 year ago Chest pressure    Nemo Gil [place], [place]    Office Visit            Future Appointments         Provider Department Appt Notes    In 4 days Audrey Pereira DO [address], [address], [facility] px, last px; 08/31/22    In 1 week MD Elías Wells [place] e[place], informed of policy    In 1 month [address] [place] [address]

## 2023-06-10 NOTE — TELEPHONE ENCOUNTER
Refill passed per [facility] protocol. Requested Prescriptions   Pending Prescriptions Disp Refills    fluticasone-salmeterol 250-50 MCG/ACT Inhalation Aerosol Powder, Breath Activated 1 each 2     Sig: INHALE ONE PUFF BY MOUTH EVERY 12 HOURS       Asthma & COPD Medication Protocol Passed - 6/9/2023 10:02 AM        Passed - In person appointment or virtual visit in the past 6 mos or appointment in next 3 mos     Recent Outpatient Visits              1 month ago Left sided sciatica    N[place] Jay [place]    Office Visit    3 months ago Follow-up exam    [address] Bijan [facility], APRN    Office Visit    9 months ago Encounter for annual health examination    Nemo Jay [place]    Office Visit    1 year ago Closed fracture of transverse process of lumbar vertebra with routine healing, subsequent encounter    Nemo Jay [place], [place]    Office Visit    1 year ago Chest pressure    Nemo Jay [place], [place]    Office Visit          Future Appointments         Provider Department Appt Notes    In 4 days Efra Marie DO [address], [address], [place] [address] px, last px; 08/31/22    In 1 week Kobe Preciado MD [address], [address], [facility], informed of policy    In 1 month [address] [facility]                  methylPREDNISolone (MEDROL) 4 MG Oral Tablet Therapy Pack 1 each 0     Sig: As directed.        There is no refill protocol information for this order          Future Appointments         Provider Department Appt Notes    In 4 days DO Jonas Mckeon [place] [address] px, last px; 08/31/22    In 1 week [facility], [address] [place] [address] Rd,[address], [place] ee, informed of policy    In 1 month [address] [facility]             Recent Outpatient Visits              1 month ago Left sided sciatica    Nemo Boo [place], [place]    Office Visit    3 months ago Follow-up exam    Pamela Boo APRN    Office Visit    9 months ago Encounter for annual health examination    Nemo Boo [place], [place]    Office Visit    1 year ago Closed fracture of transverse process of lumbar vertebra with routine healing, subsequent encounter    Nemo Boo [place], [place]    Office Visit    1 year ago Chest pressure    Nemo Boo [place], [place]    Office Visit

## 2023-06-12 NOTE — TELEPHONE ENCOUNTER
I don't typically refill Medrol dose packs as they are for acute problems. We don't like to give them repeatedly. Does she need an appointment?

## 2023-06-14 ENCOUNTER — APPOINTMENT (OUTPATIENT)
Dept: PHYSICAL THERAPY | Age: 73
End: 2023-06-14
Attending: FAMILY MEDICINE
Payer: MEDICARE

## 2023-06-14 ENCOUNTER — OFFICE VISIT (OUTPATIENT)
Dept: FAMILY MEDICINE CLINIC | Facility: CLINIC | Age: 73
End: 2023-06-14

## 2023-06-14 VITALS
TEMPERATURE: 97 F | HEIGHT: 65 IN | BODY MASS INDEX: 34.82 KG/M2 | HEART RATE: 81 BPM | SYSTOLIC BLOOD PRESSURE: 125 MMHG | WEIGHT: 209 LBS | RESPIRATION RATE: 16 BRPM | DIASTOLIC BLOOD PRESSURE: 74 MMHG

## 2023-06-14 DIAGNOSIS — H25.13 AGE-RELATED NUCLEAR CATARACT OF BOTH EYES: ICD-10-CM

## 2023-06-14 DIAGNOSIS — E66.01 SEVERE OBESITY (BMI 35.0-39.9) WITH COMORBIDITY (HCC): Chronic | ICD-10-CM

## 2023-06-14 DIAGNOSIS — H91.93 BILATERAL HEARING LOSS, UNSPECIFIED HEARING LOSS TYPE: ICD-10-CM

## 2023-06-14 DIAGNOSIS — J44.9 ASTHMA WITH COPD (CHRONIC OBSTRUCTIVE PULMONARY DISEASE) (HCC): Chronic | ICD-10-CM

## 2023-06-14 DIAGNOSIS — M54.32 LEFT SIDED SCIATICA: ICD-10-CM

## 2023-06-14 DIAGNOSIS — E11.9 TYPE 2 DIABETES MELLITUS WITHOUT RETINOPATHY (HCC): ICD-10-CM

## 2023-06-14 DIAGNOSIS — G47.33 OBSTRUCTIVE SLEEP APNEA: ICD-10-CM

## 2023-06-14 DIAGNOSIS — K57.90 DIVERTICULOSIS: ICD-10-CM

## 2023-06-14 DIAGNOSIS — J44.9 CHRONIC OBSTRUCTIVE PULMONARY DISEASE, UNSPECIFIED COPD TYPE (HCC): ICD-10-CM

## 2023-06-14 DIAGNOSIS — Z00.00 ENCOUNTER FOR ANNUAL HEALTH EXAMINATION: Primary | ICD-10-CM

## 2023-06-14 DIAGNOSIS — Z12.31 SCREENING MAMMOGRAM FOR BREAST CANCER: ICD-10-CM

## 2023-06-14 RX ORDER — LIDOCAINE 4 G/G
1 PATCH TOPICAL EVERY 24 HOURS
Qty: 15 PATCH | Refills: 2 | Status: SHIPPED | OUTPATIENT
Start: 2023-06-14

## 2023-06-14 RX ORDER — ALBUTEROL SULFATE 90 UG/1
2 AEROSOL, METERED RESPIRATORY (INHALATION) EVERY 4 HOURS PRN
Qty: 1 EACH | Refills: 3 | Status: SHIPPED | OUTPATIENT
Start: 2023-06-14

## 2023-06-16 ENCOUNTER — APPOINTMENT (OUTPATIENT)
Dept: PHYSICAL THERAPY | Age: 73
End: 2023-06-16
Attending: FAMILY MEDICINE
Payer: MEDICARE

## 2023-06-21 ENCOUNTER — APPOINTMENT (OUTPATIENT)
Dept: PHYSICAL THERAPY | Age: 73
End: 2023-06-21
Attending: FAMILY MEDICINE
Payer: MEDICARE

## 2023-06-21 ENCOUNTER — OFFICE VISIT (OUTPATIENT)
Dept: OPHTHALMOLOGY | Facility: CLINIC | Age: 73
End: 2023-06-21

## 2023-06-21 DIAGNOSIS — H43.393 VITREOUS FLOATERS OF BOTH EYES: ICD-10-CM

## 2023-06-21 DIAGNOSIS — E11.9 TYPE 2 DIABETES MELLITUS WITHOUT RETINOPATHY (HCC): Primary | ICD-10-CM

## 2023-06-21 DIAGNOSIS — H25.13 AGE-RELATED NUCLEAR CATARACT OF BOTH EYES: ICD-10-CM

## 2023-06-21 PROCEDURE — 2023F DILAT RTA XM W/O RTNOPTHY: CPT | Performed by: OPHTHALMOLOGY

## 2023-06-21 PROCEDURE — 92015 DETERMINE REFRACTIVE STATE: CPT | Performed by: OPHTHALMOLOGY

## 2023-06-21 PROCEDURE — 1159F MED LIST DOCD IN RCRD: CPT | Performed by: OPHTHALMOLOGY

## 2023-06-21 PROCEDURE — 92014 COMPRE OPH EXAM EST PT 1/>: CPT | Performed by: OPHTHALMOLOGY

## 2023-06-21 PROCEDURE — 1160F RVW MEDS BY RX/DR IN RCRD: CPT | Performed by: OPHTHALMOLOGY

## 2023-06-21 NOTE — PATIENT INSTRUCTIONS
Type 2 diabetes mellitus without retinopathy ([facility] [address]  Diabetes type II: no background of retinopathy, no signs of neovascularization noted. Discussed ocular and systemic benefits of blood sugar control. Diagnosis and treatment discussed in detail with patient. Will see patient in 1 year for a diabetic exam    Age-related nuclear cataract of both eyes  Discussed moderate cataracts in both eyes that are not affecting vision and are not surgical at this time. New glasses Rx given today, recommend update    Vitreous floaters of both eyes   There is no evidence of retinal pathology. All signs and symptoms of retinal detachment/tears explained in detail. Patient instructed to call the office if they experience increase in floaters, increase in flashes of light, loss of vision or curtain or veil effect.

## 2023-06-21 NOTE — ASSESSMENT & PLAN NOTE
Discussed moderate cataracts in both eyes that are not affecting vision and are not surgical at this time.       New glasses Rx given today, recommend update

## 2023-06-25 PROBLEM — Z86.711 HISTORY OF PULMONARY EMBOLISM: Status: RESOLVED | Noted: 2018-02-13 | Resolved: 2023-06-25

## 2023-06-25 PROBLEM — H43.393 VITREOUS FLOATERS OF BOTH EYES: Status: RESOLVED | Noted: 2018-12-13 | Resolved: 2023-06-25

## 2023-07-17 ENCOUNTER — TELEPHONE (OUTPATIENT)
Dept: FAMILY MEDICINE CLINIC | Facility: CLINIC | Age: 73
End: 2023-07-17

## 2023-07-17 DIAGNOSIS — E28.39 ESTROGEN DEFICIENCY: Primary | ICD-10-CM

## 2023-07-31 ENCOUNTER — MED REC SCAN ONLY (OUTPATIENT)
Dept: FAMILY MEDICINE CLINIC | Facility: CLINIC | Age: 73
End: 2023-07-31

## 2023-08-16 RX ORDER — PRAVASTATIN SODIUM 10 MG
10 TABLET ORAL NIGHTLY
Qty: 90 TABLET | Refills: 3 | Status: SHIPPED | OUTPATIENT
Start: 2023-08-16

## 2023-08-16 RX ORDER — NYSTATIN 100000 U/G
1 CREAM TOPICAL 2 TIMES DAILY
Qty: 30 G | Refills: 0 | OUTPATIENT
Start: 2023-08-16

## 2023-08-16 NOTE — TELEPHONE ENCOUNTER
Refill passed per [place], [facility] protocol. Requested Prescriptions   Pending Prescriptions Disp Refills    PRAVASTATIN 10 MG Oral Tab [Pharmacy Med Name: Pravastatin Sodium 10 Mg Tab Nort] 90 tablet 0     Sig: Take 1 tablet (10 mg total) by mouth nightly. Cholesterol Medication Protocol Passed - 8/15/2023 10:45 AM        Passed - ALT in past 12 months        Passed - LDL in past 12 months        Passed - Last ALT < 80     Lab Results   Component Value Date    ALT 18 08/31/2022             Passed - Last LDL < 130     Lab Results   Component Value Date    LDL 94 08/31/2022             Passed - In person appointment or virtual visit in the past 12 mos or appointment in next 3 mos     Recent Outpatient Visits              1 month ago Type 2 diabetes mellitus without retinopathy ([facility] [address]    Rayne Camejo, [address], Chucky Mcgee MD    Office Visit    2 months ago Encounter for annual health examination    [address] [facility], [place], [place]    Office Visit    3 months ago Left sided sciatica    [address] [facility], [place], [place]    Office Visit    5 months ago Follow-up exam    [address] [facility], [address] NIKKIE Reyes    Office Visit    11 months ago Encounter for annual health examination    [address] [facility], [place], [place]    Office Visit          Future Appointments         Provider Department Appt Notes    In 2 weeks LMB ADAMA RM1; LMB DEXA [address]     In 3 weeks [address] 100,000 Units/g External Cream [Pharmacy Med Name: Nystatin 100,000 Unit/Gm Cre Pada] 30 g 0     Sig: Apply 1 Application topically 2 (two) times daily.        There is no refill protocol information for this order           Future Appointments         Provider Department Appt Notes    In 2 weeks [facility]; [address]     In 3 weeks [facility] [address] [facility]             Recent Outpatient Visits              1 month ago Type 2 diabetes mellitus without retinopathy [facility])    [address], [address] [facility] [address], Ray Jimenez MD    Office Visit    2 months ago Encounter for annual health examination    Nemo Calderon [place], [place]    Office Visit    3 months ago Left sided sciatica    Nemo Calderon [place], [place]    Office Visit    5 months ago Follow-up exam    Diego Olson [facility] NIKKIE Good    Office Visit    11 months ago Encounter for annual health examination    Nemo Calderon [place], [place]    Office Visit

## 2023-08-24 RX ORDER — ISOPROPYL ALCOHOL 0.75 G/1
SWAB TOPICAL
Qty: 100 EACH | Refills: 11 | Status: SHIPPED | OUTPATIENT
Start: 2023-08-24

## 2023-08-24 NOTE — TELEPHONE ENCOUNTER
Please review refill protocol failed/ no protocol  Requested Prescriptions   Pending Prescriptions Disp Refills    Alcohol Swabs (BD SWAB SINGLE USE REGULAR) Does not apply Pads 100 each 11     Sig: Use as directed daily       There is no refill protocol information for this order

## 2023-08-31 ENCOUNTER — LAB ENCOUNTER (OUTPATIENT)
Dept: LAB | Age: 73
End: 2023-08-31
Attending: FAMILY MEDICINE
Payer: MEDICARE

## 2023-09-01 LAB
ALBUMIN/GLOBULIN RATIO: 1.6 (CALC) (ref 1–2.5)
ALBUMIN: 4.4 G/DL (ref 3.6–5.1)
ALKALINE PHOSPHATASE: 83 U/L (ref 37–153)
ALT: 17 U/L (ref 6–29)
AST: 15 U/L (ref 10–35)
BILIRUBIN, TOTAL: 0.4 MG/DL (ref 0.2–1.2)
BUN: 22 MG/DL (ref 7–25)
CALCIUM: 10 MG/DL (ref 8.6–10.4)
CARBON DIOXIDE: 27 MMOL/L (ref 20–32)
CHLORIDE: 103 MMOL/L (ref 98–110)
CHOL/HDLC RATIO: 3.3 (CALC)
CHOLESTEROL, TOTAL: 144 MG/DL
CREATININE, RANDOM URINE: 87 MG/DL (ref 20–275)
CREATININE: 0.89 MG/DL (ref 0.6–1)
EGFR: 68 ML/MIN/1.73M2
GLOBULIN: 2.8 G/DL (CALC) (ref 1.9–3.7)
GLUCOSE: 103 MG/DL (ref 65–99)
HDL CHOLESTEROL: 43 MG/DL
HEMATOCRIT: 41 % (ref 35–45)
HEMOGLOBIN A1C: 7.1 % OF TOTAL HGB
HEMOGLOBIN: 13.8 G/DL (ref 11.7–15.5)
LDL-CHOLESTEROL: 79 MG/DL (CALC)
MCH: 27.8 PG (ref 27–33)
MCHC: 33.7 G/DL (ref 32–36)
MCV: 82.5 FL (ref 80–100)
MICROALBUMIN/CREATININE RATIO, RANDOM URINE: 2 MCG/MG CREAT
MICROALBUMIN: 0.2 MG/DL
MPV: 10.4 FL (ref 7.5–12.5)
NON-HDL CHOLESTEROL: 101 MG/DL (CALC)
PLATELET COUNT: 327 THOUSAND/UL (ref 140–400)
POTASSIUM: 4.4 MMOL/L (ref 3.5–5.3)
PROTEIN, TOTAL: 7.2 G/DL (ref 6.1–8.1)
RDW: 14.4 % (ref 11–15)
RED BLOOD CELL COUNT: 4.97 MILLION/UL (ref 3.8–5.1)
SODIUM: 139 MMOL/L (ref 135–146)
TRIGLYCERIDES: 119 MG/DL
TSH: 2.25 MIU/L (ref 0.4–4.5)
WHITE BLOOD CELL COUNT: 10.1 THOUSAND/UL (ref 3.8–10.8)

## 2023-09-06 ENCOUNTER — HOSPITAL ENCOUNTER (OUTPATIENT)
Dept: MAMMOGRAPHY | Age: 73
Discharge: HOME | End: 2023-09-06
Attending: FAMILY MEDICINE
Payer: MEDICARE

## 2023-09-06 DIAGNOSIS — Z12.31 SCREENING MAMMOGRAM FOR BREAST CANCER: ICD-10-CM

## 2023-09-06 PROCEDURE — 77067 SCR MAMMO BI INCL CAD: CPT | Performed by: FAMILY MEDICINE

## 2023-09-06 PROCEDURE — 77063 BREAST TOMOSYNTHESIS BI: CPT | Performed by: FAMILY MEDICINE

## 2023-09-13 RX ORDER — LISINOPRIL 10 MG/1
10 TABLET ORAL DAILY
Qty: 90 TABLET | Refills: 3 | Status: SHIPPED | OUTPATIENT
Start: 2023-09-13

## 2023-09-14 RX ORDER — MONTELUKAST SODIUM 10 MG/1
10 TABLET ORAL NIGHTLY
Qty: 90 TABLET | Refills: 3 | OUTPATIENT
Start: 2023-09-14

## 2023-09-14 RX ORDER — LISINOPRIL 5 MG/1
5 TABLET ORAL DAILY
Qty: 90 TABLET | Refills: 0 | OUTPATIENT
Start: 2023-09-14

## 2023-09-26 RX ORDER — LISINOPRIL 5 MG/1
5 TABLET ORAL DAILY
Qty: 90 TABLET | Refills: 3 | Status: SHIPPED | OUTPATIENT
Start: 2023-09-26

## 2023-09-26 NOTE — TELEPHONE ENCOUNTER
lisinopril 10 MG Oral Tab 90 tablet 3 9/13/2023     Sig - Route: Take 1 tablet (10 mg total) by mouth daily. - Oral    Sent to pharmacy as: Lisinopril 10 MG Oral Tablet (Zestril)    E-Prescribing Status: Receipt confirmed by pharmacy (9/13/2023 10:56 AM CDT)      lisinopril 5 MG Oral Tab 90 tablet 3 9/28/2022     Sig - Route: Take 1 tablet (5 mg total) by mouth daily. Taking 15mg total - Oral    Sent to pharmacy as: Lisinopril 5 MG Oral Tablet (Prinivil; Zestril)    E-Prescribing Status: Receipt confirmed by pharmacy (9/28/2022  9:01 PM CDT)      Last office visit on 6/14/23 with PCP: HTN Rxs were not referenced in last office encounter. Patient called states that she got Lisinopril 10 mg --> qty #71 on 9/13/23 and 2nd Rx he received was #19 on 9/13/23 --> total # 90. She states she takes a total of 15 mg [as indicated on 5 mg tab Rx above]; the tablets that she has of 10 mg are not scored - she is unable to cut in half. She ran out of Rx today of the 5 mg tablets. I added notation on 5 mg tab Rx to state patient is taking total of 15 mg. She would like a call back once 5 mg is sent uJdy Chery is out of 5 mg]. I made her aware I will convey the above to Dr. Rah Carroll. Patient verbalized understanding. No further questions or concerns at this time.     Dr. Rah Carroll to review Rx and sign if appropriate

## 2023-09-27 ENCOUNTER — TELEPHONE (OUTPATIENT)
Facility: CLINIC | Age: 73
End: 2023-09-27

## 2023-09-27 NOTE — TELEPHONE ENCOUNTER
Spoke with patient, per Dr. Cheryle Oak procedure notes she will be due in September 2025. Can consider if patient is interested in continuing screening after age 75. Recall placed.

## 2023-09-27 NOTE — TELEPHONE ENCOUNTER
Patient stated [facility], but when she called to schedule a colonscopy they stated that she was not due for 5 years. Patient wanted to verify with if that is correct? Please advise.

## 2023-09-29 RX ORDER — BLOOD SUGAR DIAGNOSTIC
STRIP MISCELLANEOUS
Qty: 100 STRIP | Refills: 3 | Status: SHIPPED | OUTPATIENT
Start: 2023-09-29

## 2023-09-29 NOTE — TELEPHONE ENCOUNTER
Refill passed per [place] [employer], [facility] protocol.      Requested Prescriptions   Pending Prescriptions Disp Refills    Glucose Blood (ACCU-CHEK LORRAINE PLUS) In Vitro Strip 100 strip 3     Sig: Check blood sugar once daily       Diabetic Supplies Protocol Passed - 9/28/2023  9:33 AM        Passed - In person appointment or virtual visit in the past 12 mos or appointment in next 3 mos     Recent Outpatient Visits              3 months ago Type 2 diabetes mellitus without retinopathy ([facility]    [address], [address], [place], Felipe Jones MD    Office Visit    3 months ago Encounter for annual health examination    [address] [facility], [place], [place]    Office Visit    5 months ago Left sided sciatica    [address] [facility], [place], [place]    Office Visit    7 months ago Follow-up exam    [address] [facility], [place], [place]    Office Visit    1 year ago Encounter for annual health examination    [address] [facility], [place], [place], [place]    Office Visit                           [email or web address]      [email or web address]

## 2023-10-10 ENCOUNTER — HOSPITAL ENCOUNTER (OUTPATIENT)
Age: 73
Discharge: HOME | End: 2023-10-10
Payer: MEDICARE

## 2023-10-10 VITALS
HEART RATE: 90 BPM | DIASTOLIC BLOOD PRESSURE: 68 MMHG | TEMPERATURE: 98 F | OXYGEN SATURATION: 100 % | RESPIRATION RATE: 20 BRPM | SYSTOLIC BLOOD PRESSURE: 123 MMHG

## 2023-10-10 DIAGNOSIS — J01.40 ACUTE NON-RECURRENT PANSINUSITIS: Primary | ICD-10-CM

## 2023-10-10 DIAGNOSIS — Z20.822 ENCOUNTER FOR LABORATORY TESTING FOR COVID-19 VIRUS: ICD-10-CM

## 2023-10-10 LAB — SARS-COV-2 RNA RESP QL NAA+PROBE: NOT DETECTED

## 2023-10-10 PROCEDURE — 99213 OFFICE O/P EST LOW 20 MIN: CPT | Performed by: EMERGENCY MEDICINE

## 2023-10-10 PROCEDURE — U0002 COVID-19 LAB TEST NON-CDC: HCPCS | Performed by: EMERGENCY MEDICINE

## 2023-10-10 RX ORDER — DOXYCYCLINE 100 MG/1
100 CAPSULE ORAL 2 TIMES DAILY
Qty: 20 CAPSULE | Refills: 0 | Status: SHIPPED | OUTPATIENT
Start: 2023-10-10 | End: 2023-10-20

## 2023-10-10 NOTE — DISCHARGE INSTRUCTIONS
Over-the-counter Mucinex if there is no contraindication. It has Tylenol in there so do not double dose yourself. Do not exceed 4000 mg in 24 hours. Call your primary care for follow-up as needed if you are not better within the next 7 to 10 days. Antibiotics do not treat your symptoms it will treat the bacterial infection so make sure you are treating yourself appropriately. Read after visit summary on sinusitis.

## 2023-10-10 NOTE — ED INITIAL ASSESSMENT (HPI)
Pt with cough, congestion and fatigue x2 wks with headaches at night; denies fever or current headache

## 2023-10-22 RX ORDER — FLUTICASONE PROPIONATE 50 MCG
2 SPRAY, SUSPENSION (ML) NASAL DAILY
Qty: 3 EACH | Refills: 3 | Status: SHIPPED | OUTPATIENT
Start: 2023-10-22

## 2023-10-22 NOTE — TELEPHONE ENCOUNTER
Refill passed per [facility] protocol.    Requested Prescriptions   Pending Prescriptions Disp Refills    fluticasone propionate (FLONASE) 50 MCG/ACT Nasal Suspension 3 each 1     Si sprays by Nasal route daily.       Allergy Medication Protocol Passed - 10/21/2023 11:08 AM        Passed - In person appointment or virtual visit in the past 12 mos or appointment in next 3 mos     Recent Outpatient Visits              4 months ago Type 2 diabetes mellitus without retinopathy [facility]    Office Visit    4 months ago Encounter for annual health examination    [facility] Weiler, Colleen M, DO    Office Visit    6 months ago Left sided sciatica    [facility] Weiler, Colleen M, DO    Office Visit    7 months ago Follow-up exam    [facility] FloriShannan, APRN    Office Visit    1 year ago Encounter for annual health examination    [facility] Weiler, Colleen M, DO    Office Visit                               Recent Outpatient Visits              4 months ago Type 2 diabetes mellitus without retinopathy (HCC)    [facility] [place]    Office Visit    4 months ago Encounter for annual health examination    [facility] Weiler, Colleen M, DO    Office Visit    6 months ago Left sided sciatica    [facility] Weiler, Colleen M, DO    Office Visit    7 months ago Follow-up exam    [facility] Shannan Ruby, APRN    Office Visit    1 year ago Encounter for annual health examination    [facility] Weiler, Colleen M, DO    Office Visit

## 2023-11-02 RX ORDER — ALBUTEROL SULFATE 90 UG/1
2 AEROSOL, METERED RESPIRATORY (INHALATION) EVERY 4 HOURS PRN
Qty: 1 EACH | Refills: 3 | Status: SHIPPED | OUTPATIENT
Start: 2023-11-02

## 2023-11-02 NOTE — TELEPHONE ENCOUNTER
Refill passed per [facility] [facility], [facility] protocol. Requested Prescriptions   Pending Prescriptions Disp Refills    albuterol 108 (90 Base) MCG/ACT Inhalation Aero Soln 1 each 3     Sig: Inhale 2 puffs into the lungs every 4 (four) hours as needed for Wheezing.        Asthma & COPD Medication Protocol Passed - 10/31/2023 12:34 PM        Passed - In person appointment or virtual visit in the past 6 mos or appointment in next 3 mos     Recent Outpatient Visits              4 months ago Type 2 diabetes mellitus without retinopathy ([facility]    Rayne Camejo, [address], Malachi Sosa MD    Office Visit    4 months ago Encounter for annual health examination    [facility] [place]    Office Visit    6 months ago Left sided sciatica    Nemo Weiss, [place], [place]    Office Visit    8 months ago Follow-up exam    Keli Weiss [address] [place] Amarilys, APRN    Office Visit    1 year ago Encounter for annual health examination    Nemo Weiss, [place], [place]    Office Visit                         Recent Outpatient Visits              4 months ago Type 2 diabetes mellitus without retinopathy [facility])    Rayne Camejo, [address], Malachi Sosa MD    Office Visit    4 months ago Encounter for annual health examination    Nemo Weiss, [place], [place]    Office Visit    6 months ago Left sided sciatica    Bhavana Bacon, [address] [place] [address]    Office Visit    8 months ago Follow-up exam    [facility] Yam[address] Amarilys, APRN    Office Visit    1 year ago Encounter for annual health examination    Nemo Weiss, [place], [place]    Office Visit

## 2023-11-06 ENCOUNTER — HOSPITAL ENCOUNTER (OUTPATIENT)
Age: 73
Discharge: HOME | End: 2023-11-06
Payer: MEDICARE

## 2023-11-06 VITALS
TEMPERATURE: 98 F | SYSTOLIC BLOOD PRESSURE: 122 MMHG | OXYGEN SATURATION: 97 % | DIASTOLIC BLOOD PRESSURE: 58 MMHG | RESPIRATION RATE: 20 BRPM | HEART RATE: 97 BPM

## 2023-11-06 DIAGNOSIS — J01.90 ACUTE BACTERIAL SINUSITIS: Primary | ICD-10-CM

## 2023-11-06 DIAGNOSIS — B96.89 ACUTE BACTERIAL SINUSITIS: Primary | ICD-10-CM

## 2023-11-06 PROCEDURE — 99213 OFFICE O/P EST LOW 20 MIN: CPT | Performed by: PHYSICIAN ASSISTANT

## 2023-11-06 RX ORDER — DOXYCYCLINE HYCLATE 100 MG/1
100 CAPSULE ORAL 2 TIMES DAILY
Qty: 20 CAPSULE | Refills: 0 | Status: SHIPPED | OUTPATIENT
Start: 2023-11-06 | End: 2023-11-16

## 2023-11-06 NOTE — ED INITIAL ASSESSMENT (HPI)
Pt states having a HA for about a week now. Pt states having a cough as well with sinus congestion. Pt denies fever or NVD. Pt states did get her covid and flu shot last week before symptoms began.

## 2023-11-30 ENCOUNTER — TELEPHONE (OUTPATIENT)
Dept: OPHTHALMOLOGY | Facility: CLINIC | Age: 73
End: 2023-11-30

## 2023-11-30 NOTE — TELEPHONE ENCOUNTER
Endorsed to patient we do not measure PD in office routinely and to go to optical shop like [employer] etc

## 2023-12-14 RX ORDER — BLOOD SUGAR DIAGNOSTIC
STRIP MISCELLANEOUS
Qty: 100 STRIP | Refills: 3 | Status: CANCELLED | OUTPATIENT
Start: 2023-12-14

## 2023-12-21 NOTE — TELEPHONE ENCOUNTER
Please review. Protocol failed / Has no protocol. Requested Prescriptions   Pending Prescriptions Disp Refills    metFORMIN 500 MG Oral Tab 180 tablet 3     Sig: Take 1 tablet (500 mg total) by mouth 2 (two) times daily with meals.        Diabetes Medication Protocol Failed - 12/20/2023  5:15 PM        Failed - In person appointment or virtual visit in the past 6 mos or appointment in next 3 mos     Recent Outpatient Visits              6 months ago Type 2 diabetes mellitus without retinopathy ([address]    [facility], [address], Erinn Peguero MD    Office Visit    6 months ago Encounter for annual health examination    [address] [facility], [place], [place], [place]    Office Visit    8 months ago Left sided sciatica    [address] [facility], [place], [place], [place]    Office Visit    9 months ago Follow-up exam    [address] [facility], [place] [address] [place], APRN    Office Visit    1 year ago Encounter for annual health examination    [address] [facility], [place], [place], [place]    Office Visit                      Passed - Last A1C < 7.5 and within past 6 months     Lab Results   Component Value Date    A1C 7.1 (H) 08/31/2023             Passed - EGFRCR or GFRAA > 50     GFR Evaluation  EGFRCR: 68 , resulted on 8/31/2023          Passed - GFR in the past 12 months              Recent Outpatient Visits              6 months ago Type 2 diabetes mellitus without retinopathy ([facility]    [facility], [address], [place]    Office Visit    6 months ago Encounter for annual health examination    [address] [facility], [place], [place], [place]    Office Visit    8 months ago Left sided sciatica    [address] [facility], [address] [place] [address]    Office Visit    9 months ago Follow-up exam [address] NIKKIE Good    Office Visit    1 year ago Encounter for annual health examination    [address] [facility] [place]    Office Visit

## 2023-12-30 RX ORDER — FLUTICASONE PROPIONATE AND SALMETEROL 250; 50 UG/1; UG/1
POWDER RESPIRATORY (INHALATION)
Qty: 1 EACH | Refills: 2 | Status: SHIPPED | OUTPATIENT
Start: 2023-12-30

## 2023-12-30 NOTE — TELEPHONE ENCOUNTER
Refill passed per [facility] protocol.   Pt due for OV  Requested Prescriptions   Pending Prescriptions Disp Refills    FLUTICASONE-SALMETEROL 250-50 MCG/ACT Inhalation Aerosol Powder, Breath Activated [Pharmacy Med Name: Fluticasone Propionate/Salmeterol Diskus 250-50mcg/ Aer Pras]  0     Sig: INHALE ONE PUFF BY MOUTH EVERY 12 HOURS       Asthma & COPD Medication Protocol Failed - 12/29/2023  1:31 AM        Failed - In person appointment or virtual visit in the past 6 mos or appointment in next 3 mos     Recent Outpatient Visits              6 months ago Type 2 diabetes mellitus without retinopathy (HCC)    [facility] Neno Bailey MD    Office Visit    6 months ago Encounter for annual health examination    [facility] Weiler, Colleen M, DO    Office Visit    8 months ago Left sided sciatica    [facility] Weiler, Colleen M, DO    Office Visit    10 months ago Follow-up exam    [facility] Shannan Ruby APRN    Office Visit    1 year ago Encounter for annual health examination    [facility] Weiler, Colleen M, DO    Office Visit                            Recent Outpatient Visits              6 months ago Type 2 diabetes mellitus without retinopathy (HCC)    [facility]    Office Visit    6 months ago Encounter for annual health examination    [facility] Weiler, Colleen M, DO    Office Visit    8 months ago Left sided sciatica    [facility] Weiler, Colleen M, DO    Office Visit    10 months ago Follow-up exam    [facility] Shannan Ruby APRN    Office Visit    1 year ago Encounter for annual health examination     [facility] Weiler, Colleen M, DO    Office Visit

## 2024-03-04 ENCOUNTER — OFFICE VISIT (OUTPATIENT)
Dept: FAMILY MEDICINE CLINIC | Facility: CLINIC | Age: 74
End: 2024-03-04

## 2024-03-04 VITALS
TEMPERATURE: 98 F | HEART RATE: 84 BPM | WEIGHT: 208 LBS | BODY MASS INDEX: 35 KG/M2 | SYSTOLIC BLOOD PRESSURE: 125 MMHG | DIASTOLIC BLOOD PRESSURE: 75 MMHG | RESPIRATION RATE: 18 BRPM

## 2024-03-04 DIAGNOSIS — J44.89 ASTHMA WITH COPD (CHRONIC OBSTRUCTIVE PULMONARY DISEASE): ICD-10-CM

## 2024-03-04 DIAGNOSIS — J01.00 ACUTE NON-RECURRENT MAXILLARY SINUSITIS: ICD-10-CM

## 2024-03-04 DIAGNOSIS — E11.9 TYPE 2 DIABETES MELLITUS WITHOUT RETINOPATHY (HCC): Primary | ICD-10-CM

## 2024-03-04 DIAGNOSIS — I10 PRIMARY HYPERTENSION: ICD-10-CM

## 2024-03-04 LAB
CARTRIDGE LOT#: ABNORMAL NUMERIC
HEMOGLOBIN A1C: 7.4 % (ref 4.3–5.6)

## 2024-03-04 PROCEDURE — 3051F HG A1C>EQUAL 7.0%<8.0%: CPT | Performed by: FAMILY MEDICINE

## 2024-03-04 PROCEDURE — 3074F SYST BP LT 130 MM HG: CPT | Performed by: FAMILY MEDICINE

## 2024-03-04 PROCEDURE — 1160F RVW MEDS BY RX/DR IN RCRD: CPT | Performed by: FAMILY MEDICINE

## 2024-03-04 PROCEDURE — 1159F MED LIST DOCD IN RCRD: CPT | Performed by: FAMILY MEDICINE

## 2024-03-04 PROCEDURE — 3078F DIAST BP <80 MM HG: CPT | Performed by: FAMILY MEDICINE

## 2024-03-04 PROCEDURE — 83036 HEMOGLOBIN GLYCOSYLATED A1C: CPT | Performed by: FAMILY MEDICINE

## 2024-03-04 PROCEDURE — 99214 OFFICE O/P EST MOD 30 MIN: CPT | Performed by: FAMILY MEDICINE

## 2024-03-04 PROCEDURE — G2211 COMPLEX E/M VISIT ADD ON: HCPCS | Performed by: FAMILY MEDICINE

## 2024-03-04 RX ORDER — LOSARTAN POTASSIUM 25 MG/1
25 TABLET ORAL DAILY
Qty: 90 TABLET | Refills: 1 | Status: SHIPPED | OUTPATIENT
Start: 2024-03-04

## 2024-03-04 RX ORDER — ALBUTEROL SULFATE 90 UG/1
2 AEROSOL, METERED RESPIRATORY (INHALATION) EVERY 4 HOURS PRN
Qty: 1 EACH | Refills: 3 | Status: SHIPPED | OUTPATIENT
Start: 2024-03-04

## 2024-03-04 RX ORDER — DOXYCYCLINE HYCLATE 100 MG/1
100 CAPSULE ORAL 2 TIMES DAILY
Qty: 20 CAPSULE | Refills: 0 | Status: SHIPPED | OUTPATIENT
Start: 2024-03-04 | End: 2024-03-14

## 2024-03-04 RX ORDER — FLUTICASONE PROPIONATE 50 MCG
2 SPRAY, SUSPENSION (ML) NASAL DAILY
Qty: 3 EACH | Refills: 3 | Status: SHIPPED | OUTPATIENT
Start: 2024-03-04

## 2024-03-04 NOTE — PROGRESS NOTES
HPI: Silvana is a 73 year old female who presents for follow-up. Has been more congested and has sinus pressure. Has had sinus infections in the past.  Symptoms started last week.  Has a lot of pressure in face.  Has post nasal drainage. Takes Flonase.     States breathing is good.  No problems.     Has HTN.  Takes Lisinopril.  had angioedema as well as sister in law so she is concerned.     States she is eating healthy. A1C 7.4 today.  Last was 7.1. Can no longer cut her own toenails.  She cannot reach.  Would like to see Podiatry.     PMH:    Past Medical History:   Diagnosis Date    Asthma (HCC)     Chest pain     Diabetes (HCC)     Diverticulosis     Esophageal reflux     Extrinsic asthma, unspecified     History of blood transfusion     Polyp of colon 07/2017    None in 2020. Repeat CLN in 2025 if patient interested in screening/remains in good health    Pulmonary embolism (HCC)     Sleep apnea       Alg:  Penicillins   Meds:   Current Outpatient Medications on File Prior to Visit   Medication Sig Dispense Refill    fluticasone-salmeterol 250-50 MCG/ACT Inhalation Aerosol Powder, Breath Activated INHALE ONE PUFF BY MOUTH EVERY 12 HOURS 1 each 2    metFORMIN 500 MG Oral Tab Take 1 tablet (500 mg total) by mouth 2 (two) times daily with meals. 180 tablet 1    albuterol 108 (90 Base) MCG/ACT Inhalation Aero Soln Inhale 2 puffs into the lungs every 4 (four) hours as needed for Wheezing. 1 each 3    fluticasone propionate (FLONASE) 50 MCG/ACT Nasal Suspension 2 sprays by Nasal route daily. 3 each 3    lisinopril 5 MG Oral Tab Take 1 tablet (5 mg total) by mouth daily. Taking 15mg total 90 tablet 3    lisinopril 10 MG Oral Tab Take 1 tablet (10 mg total) by mouth daily. 90 tablet 3    pravastatin 10 MG Oral Tab Take 1 tablet (10 mg total) by mouth nightly. 90 tablet 3    montelukast 10 MG Oral Tab Take 1 tablet (10 mg total) by mouth nightly. At Bedtime 90 tablet 3    meclizine 25 MG Oral Tab Take 1 tablet (25  mg total) by mouth 2 (two) times daily as needed. 60 tablet 0    omeprazole 20 MG Oral Capsule Delayed Release Take 1 capsule (20 mg total) by mouth every morning. 90 capsule 3    aspirin 81 MG Oral Tab Take 1 tablet (81 mg total) by mouth daily.      Cyanocobalamin (VITAMIN B-12 CR OR) Take  by mouth.      Multiple Vitamins-Minerals (CENTRUM SILVER) Oral Tab Take  by mouth. Take one tablet by mouth daily      Spacer/Aero-Holding Chambers (COMPACT SPACE CHAMBER) Does not apply Device TO BE USED WITH ALBUTEROL, INHALE 2 PUFFS EVERY 4 HOURS AS NEEDED      Glucose Blood (ACCU-CHEK LORRAINE PLUS) In Vitro Strip Check blood sugar once daily 100 strip 3    lidocaine (LIDO DEION) 4 % External Patch Place 1 patch onto the skin daily. 15 patch 2    Accu-Chek Softclix Lancets Does not apply Misc Check blood glucose once daily. 90 each 3    Lancets Misc. Does not apply Misc To check blood sugar once daily 100 each 3    Glucose Blood In Vitro Strip place 1 by Misc.(Non-Drug; Combo Route) route 3 times every day with glucose monitor 100 strip 3    Blood Glucose Monitoring Suppl (BLOOD GLUCOSE MONITOR SYSTEM) w/Device Does not apply Kit One Touch machine. Please check accucheck once daily 1 kit 0     No current facility-administered medications on file prior to visit.      Tobacco Use: no    ROS: see HPI    Objective:   Gen: AOx3. NAD.   /75   Pulse 84   Temp 97.7 °F (36.5 °C) (Temporal)   Resp 18   Wt 208 lb (94.3 kg)   BMI 34.61 kg/m²   HEENT: Conjunctiva clear.  Ismael ear canals clear.  Ismael TMs intact with good landmarks noted.  Nares patent. Mild tenderness noted to bilateral maxillary sinus.  Oral mucous membrane moist.  Normal lips, teeth, and gums.  Oropharynx normal.  Neck supple.  Good ROM.  No LAD.  Thyroid normal.  CV:  Regular rate and rhythm; no murmurs  Lungs:  Clear to ausculation; good aeration               No wheezes, rales or rhonchi        Assessment:/Plan:  Encounter Diagnoses   Name Primary?    Type 2  diabetes mellitus without retinopathy (HCC)    A1C elevated this time.  Encouraged better diet.  Plan to recheck in 3 months.    Yes    Asthma with COPD (chronic obstructive pulmonary disease)    Stable.  CPM       Primary hypertension    Controlled.  CPM       Acute non-recurrent maxillary sinusitis    Start Doxycycline.  Continue anti-histamine and Flonase.         Colleen Weiler,

## 2024-03-10 RX ORDER — OMEPRAZOLE 20 MG/1
20 CAPSULE, DELAYED RELEASE ORAL EVERY MORNING
Qty: 90 CAPSULE | Refills: 3 | Status: SHIPPED | OUTPATIENT
Start: 2024-03-10

## 2024-03-10 NOTE — TELEPHONE ENCOUNTER
Refill passed per [facility] protocol.     Requested Prescriptions   Pending Prescriptions Disp Refills    OMEPRAZOLE 20 MG Oral Capsule Delayed Release [Pharmacy Med Name: Omeprazole Dr 20 Mg Cap Nort] 90 capsule 0     Sig: Take 1 capsule (20 mg total) by mouth every morning.       Gastrointestional Medication Protocol Passed - 3/9/2024 10:30 AM        Passed - In person appointment or virtual visit in the past 12 mos or appointment in next 3 mos     Recent Outpatient Visits              6 days ago Type 2 diabetes mellitus without retinopathy (HCC)    [facility] Weiler, Colleen M, DO    Office Visit    8 months ago Type 2 diabetes mellitus without retinopathy (HCC)    [facility] Neno Bailey MD    Office Visit    9 months ago Encounter for annual health examination    [facility] Weiler, Colleen M, DO    Office Visit    10 months ago Left sided sciatica    [facility] Weiler, Colleen M, DO    Office Visit    1 year ago Follow-up exam    [facility] Shannan Ruby APRN    Office Visit          Future Appointments         Provider Department Appt Notes    In 2 weeks Ar Lund DPM [facility] diabetic foot check and nail care

## 2024-03-25 ENCOUNTER — OFFICE VISIT (OUTPATIENT)
Dept: PODIATRY CLINIC | Facility: CLINIC | Age: 74
End: 2024-03-25
Payer: MEDICARE

## 2024-03-25 DIAGNOSIS — E11.9 TYPE 2 DIABETES MELLITUS WITHOUT RETINOPATHY (HCC): Primary | ICD-10-CM

## 2024-03-25 DIAGNOSIS — L84 FOOT CALLUS: ICD-10-CM

## 2024-03-25 DIAGNOSIS — M21.612 BILATERAL BUNIONS: ICD-10-CM

## 2024-03-25 DIAGNOSIS — B35.1 ONYCHOMYCOSIS: ICD-10-CM

## 2024-03-25 DIAGNOSIS — M79.672 BILATERAL FOOT PAIN: ICD-10-CM

## 2024-03-25 DIAGNOSIS — M79.671 BILATERAL FOOT PAIN: ICD-10-CM

## 2024-03-25 DIAGNOSIS — M21.611 BILATERAL BUNIONS: ICD-10-CM

## 2024-03-25 PROCEDURE — 99203 OFFICE O/P NEW LOW 30 MIN: CPT | Performed by: PODIATRIST

## 2024-03-25 PROCEDURE — 1159F MED LIST DOCD IN RCRD: CPT | Performed by: PODIATRIST

## 2024-03-25 NOTE — PROGRESS NOTES
[facility] Podiatry  Progress Note    Silvana Morales is a 73 year old female.   Chief Complaint   Patient presents with    Diabetic Foot Care     Consult Diabetic Foot Care. On and off Pain 2/10 when walling bare footed.  yesterday morning on 3/4/24  A1C= 7.4         HPI:     This is a pleasant type 2 diabetic female with COPD.    She presents to clinic today for diabetic foot exam.  She complains of long thick painful toenails which she is unable to trim herself.         Allergies: Penicillins   Current Outpatient Medications   Medication Sig Dispense Refill    omeprazole 20 MG Oral Capsule Delayed Release Take 1 capsule (20 mg total) by mouth every morning. 90 capsule 3    albuterol 108 (90 Base) MCG/ACT Inhalation Aero Soln Inhale 2 puffs into the lungs every 4 (four) hours as needed for Wheezing. 1 each 3    fluticasone propionate (FLONASE) 50 MCG/ACT Nasal Suspension 2 sprays by Nasal route daily. 3 each 3    losartan 25 MG Oral Tab Take 1 tablet (25 mg total) by mouth daily. 90 tablet 1    fluticasone-salmeterol 250-50 MCG/ACT Inhalation Aerosol Powder, Breath Activated INHALE ONE PUFF BY MOUTH EVERY 12 HOURS 1 each 2    metFORMIN 500 MG Oral Tab Take 1 tablet (500 mg total) by mouth 2 (two) times daily with meals. 180 tablet 1    Spacer/Aero-Holding Chambers (COMPACT SPACE CHAMBER) Does not apply Device TO BE USED WITH ALBUTEROL, INHALE 2 PUFFS EVERY 4 HOURS AS NEEDED      Glucose Blood (ACCU-CHEK LORRAINE PLUS) In Vitro Strip Check blood sugar once daily 100 strip 3    pravastatin 10 MG Oral Tab Take 1 tablet (10 mg total) by mouth nightly. 90 tablet 3    montelukast 10 MG Oral Tab Take 1 tablet (10 mg total) by mouth nightly. At Bedtime 90 tablet 3    lidocaine (LIDO DEION) 4 % External Patch Place 1 patch onto the skin daily. 15 patch 2    meclizine 25 MG Oral Tab Take 1 tablet (25 mg total) by mouth 2 (two) times daily as needed. 60 tablet 0    Accu-Chek Softclix Lancets Does not apply Misc Check  blood glucose once daily. 90 each 3    Lancets Misc. Does not apply Misc To check blood sugar once daily 100 each 3    Glucose Blood In Vitro Strip place 1 by Misc.(Non-Drug; Combo Route) route 3 times every day with glucose monitor 100 strip 3    Blood Glucose Monitoring Suppl (BLOOD GLUCOSE MONITOR SYSTEM) w/Device Does not apply Kit One Touch machine. Please check accucheck once daily 1 kit 0    aspirin 81 MG Oral Tab Take 1 tablet (81 mg total) by mouth daily.      Cyanocobalamin (VITAMIN B-12 CR OR) Take  by mouth.      Multiple Vitamins-Minerals (CENTRUM SILVER) Oral Tab Take  by mouth. Take one tablet by mouth daily        Past Medical History:   Diagnosis Date    Asthma (HCC)     Chest pain     Diabetes (HCC)     Diverticulosis     Esophageal reflux     Extrinsic asthma, unspecified     History of blood transfusion     Polyp of colon 2017    None in . Repeat CLN in  if patient interested in screening/remains in good health    Pulmonary embolism (HCC)     Sleep apnea       Past Surgical History:   Procedure Laterality Date    CARDIAC CATHETERIZATION      CHOLECYSTECTOMY      COLONOSCOPY      COLONOSCOPY N/A 2017    Procedure: COLONOSCOPY;  Surgeon: RUPERT Infante MD;  Location: [facility] ENDOSCOPY    COLONOSCOPY N/A 2020    Procedure: COLONOSCOPY;  Surgeon: RUPERT Infante MD;  Location: [facility] ENDOSCOPY    HYSTERECTOMY        Family History   Problem Relation Age of Onset    Diabetes Mother 67        CAUSE OF DEATH    Other (DVT) Son     Diabetes Sister     Cancer Sister 53        RENAL    Hypertension Brother     Glaucoma Neg     Macular degeneration Neg       Social History     Socioeconomic History    Marital status:    Tobacco Use    Smoking status: Former     Years: 30     Types: Cigarettes     Quit date: 1980     Years since quittin.2    Smokeless tobacco: Never   Vaping Use    Vaping Use: Never used   Substance and Sexual Activity    Alcohol use: Yes     Alcohol/week:  1.0 standard drink of alcohol     Types: 1 Shots of liquor per week     Comment: \"socially\"    Drug use: No           REVIEW OF SYSTEMS:   Denies nausea, fever, chills  No calf pain  No other muscle or joint aches  Denies chest pain or shortness of breath.      EXAM:   There were no vitals taken for this visit.    Constitutional:   Patient in no apparent distress. Well kept. Of normal body habitus. Alert and oriented to person, place, and time.  Vascular Examination:  DP pulse is NP  PT pulse is 2/4  Capillary refill is immediate  Integumentary Examination:   The patient's nails appear incurvated, thickened, elongated, dystrophic, discolored with subungual debris 1-5 right, 1-5  left nails.  Digital hair growth is present left and is present right.    Calluses to b/l sub 2nd and sub 5th met head and b/l lateral 5th toes    Neurological Examination:  Monofilament (10-g) sensation is 5/5 to right and 5/5 to left.  Sharp/dull is present to right and is present to left.  Parasthesias absent.  Musculoskeletal Examination:  Muscle Strength is 5/5.  Bunions Deformity present  bilateral.  Hammer digit deformity present digits 2-5  bilateral.  Pain on palpation to nails.      LABS & IMAGING:     Lab Results   Component Value Date     (H) 08/31/2023    BUN 22 08/31/2023    CREATSERUM 0.89 08/31/2023    BUNCREA SEE NOTE: 08/31/2023    ANIONGAP 9 04/23/2018    GFRAA 83 05/19/2021    GFRNAA 72 05/19/2021    CA 10.0 08/31/2023     08/31/2023    K 4.4 08/31/2023     08/31/2023    CO2 27 08/31/2023    OSMOCALC 288 04/23/2018        Lab Results   Component Value Date    A1C 7.4 (A) 03/04/2024        No results found.     ASSESSMENT AND PLAN:   Diagnoses and all orders for this visit:    Type 2 diabetes mellitus without retinopathy (HCC)    Bilateral foot pain    Onychomycosis    Foot callus    Bilateral bunions        Plan:     Diabetic education and instructions have been provided. We reviewed and discussed the  following:    -risk categories related to pts with diabetes and foot or lower extremity complications per ADA.    -adherence to medication regimen and close monitoring or blood sugar control.   -daily monitoring/inspection of feet and shoes.   -proper management of diet and weight   -regular follow up with PCP and specialty providers as recommended   -Lower extremity complications related to DM were reviewed and stressed prevention.      Evaluated the patient. Discussed treatment options with the patient.  Discussed with patient proper care and hygiene for their feet.  Patient tolerated procedures well, without incident.    Instrumentation used includes nail nippers and electric  where appropriate.  Procedure: (91723 Debridement of toenails 6-10) Surgically debrided and mechanically reduced 6 or more toenails.Hemmorhage occurred none.Nails that were debrided appeared dystrophic and caused the patient pain in shoe gear.Nails 5 Left & 5 Right.       Pt refuses any callus debridement today    RTC 3 months for diabetic foot care.  Will only debride toenails as she refuses callus debridement.      No follow-ups on file.    Ar Lund DPM  3/25/2024

## 2024-04-05 RX ORDER — LANCETS
EACH MISCELLANEOUS
Qty: 100 EACH | Refills: 1 | Status: SHIPPED | OUTPATIENT
Start: 2024-04-05

## 2024-04-05 NOTE — TELEPHONE ENCOUNTER
Refill passed per [facility] protocol.     Requested Prescriptions   Pending Prescriptions Disp Refills    ACCU-CHEK SOFTCLIX LANCETS Does not apply Misc [Pharmacy Med Name: Accu-Chek Softclix Lancets Mis Roch]  0     Sig: Check blood glucose once daily.       Diabetic Supplies Protocol Passed - 4/4/2024  3:09 PM        Passed - In person appointment or virtual visit in the past 12 mos or appointment in next 3 mos     Recent Outpatient Visits              1 week ago Type 2 diabetes mellitus without retinopathy (HCC)    [facility]    Office Visit    1 month ago Type 2 diabetes mellitus without retinopathy (HCC)    [facility] Weiler, Colleen M, DO    Office Visit    9 months ago Type 2 diabetes mellitus without retinopathy (HCC)    [facility]    [facility] Visit    9 months ago Encounter for annual health examination    [facility] Weiler, Colleen M,     Office Visit    11 months ago Left sided sciatica    [facility] Weiler, Colleen M, DO    Office Visit                            Recent Outpatient Visits              1 week ago Type 2 diabetes mellitus without retinopathy (HCC)    [facility] VaughnAr Mooney DPM    Office Visit    1 month ago Type 2 diabetes mellitus without retinopathy (HCC)    [facility] Weiler, Colleen M,     Office Visit    9 months ago Type 2 diabetes mellitus without retinopathy (HCC)    [facility]    [facility] Visit    9 months ago Encounter for annual health examination    [facility] Weiler, Colleen M,     Office Visit    11 months ago Left sided  sciatica    [facility] Weiler, Colleen M, DO    Office Visit

## 2024-04-09 RX ORDER — FLUTICASONE PROPIONATE AND SALMETEROL 250; 50 UG/1; UG/1
POWDER RESPIRATORY (INHALATION)
Qty: 60 EACH | Refills: 3 | Status: SHIPPED | OUTPATIENT
Start: 2024-04-09

## 2024-04-09 NOTE — TELEPHONE ENCOUNTER
Refill passed per [facility] protocol.  Requested Prescriptions   Pending Prescriptions Disp Refills    FLUTICASONE-SALMETEROL 250-50 MCG/ACT Inhalation Aerosol Powder, Breath Activated [Pharmacy Med Name: Fluticasone Propionate/Salmeterol Diskus 250-50mcg/ Aer Pras]  0     Sig: INHALE ONE PUFF BY MOUTH EVERY 12 HOURS       Asthma & COPD Medication Protocol Passed - 4/8/2024  1:31 AM        Passed - Asthma Action Score greater than or equal to 20        Passed - Appointment in past 6 or next 3 months      Recent Outpatient Visits              2 weeks ago Type 2 diabetes mellitus without retinopathy (HCC)    [facility]avy[facility] Mooney DPM    Office Visit    1 month ago Type 2 diabetes mellitus without retinopathy (HCC)    [facility]    Office Visit    9 months ago Type 2 diabetes mellitus without retinopathy (HCC)    [facility], [facility]avy[facility]eno Soto MD    Office Visit    10 months ago Encounter for annual health examination    [facility], [facility]    Office Visit    11 months ago Left sided sciatica    [facility] Weiler, Colleen M, DO    Office Visit                      Passed - AAP/ACT given in last 12 months     Yes  Yes  No data recorded  20             Recent Outpatient Visits              2 weeks ago Type 2 diabetes mellitus without retinopathy (HCC)    [facility], KeavyAr Mooney DPM    Office Visit    1 month ago Type 2 diabetes mellitus without retinopathy (HCC)    [facility] Weiler, Colleen M, DO    Office Visit    9 months ago Type 2 diabetes mellitus without retinopathy (HCC)    [facility]    Office Visit    10  months ago Encounter for annual health examination    [facility] [address] [place] Weiler, Colleen M, DO    Office Visit    11 months ago Left sided sciatica    [facility] [address] [place] Weiler, Colleen M, DO    Office Visit

## 2024-04-16 ENCOUNTER — NURSE TRIAGE (OUTPATIENT)
Dept: FAMILY MEDICINE CLINIC | Facility: CLINIC | Age: 74
End: 2024-04-16

## 2024-04-16 ENCOUNTER — HOSPITAL ENCOUNTER (OUTPATIENT)
Age: 74
Discharge: ANOTHER HOSPITAL | End: 2024-04-16
Payer: MEDICARE

## 2024-04-16 VITALS
DIASTOLIC BLOOD PRESSURE: 73 MMHG | TEMPERATURE: 97 F | HEART RATE: 80 BPM | RESPIRATION RATE: 20 BRPM | SYSTOLIC BLOOD PRESSURE: 125 MMHG | OXYGEN SATURATION: 98 %

## 2024-04-16 DIAGNOSIS — R42 DIZZINESS: Primary | ICD-10-CM

## 2024-04-16 DIAGNOSIS — Z11.52 ENCOUNTER FOR SCREENING FOR COVID-19: ICD-10-CM

## 2024-04-16 DIAGNOSIS — R00.2 PALPITATIONS: ICD-10-CM

## 2024-04-16 LAB
ATRIAL RATE: 79 BPM
GLUCOSE BLDC GLUCOMTR-MCNC: 135 MG/DL (ref 70–99)
P AXIS: 75 DEGREES
P-R INTERVAL: 194 MS
Q-T INTERVAL: 376 MS
QRS DURATION: 78 MS
QTC CALCULATION (BEZET): 431 MS
R AXIS: 18 DEGREES
SARS-COV-2 RNA RESP QL NAA+PROBE: NOT DETECTED
T AXIS: 59 DEGREES
VENTRICULAR RATE: 79 BPM

## 2024-04-16 PROCEDURE — 99215 OFFICE O/P EST HI 40 MIN: CPT | Performed by: PHYSICIAN ASSISTANT

## 2024-04-16 PROCEDURE — 82962 GLUCOSE BLOOD TEST: CPT | Performed by: PHYSICIAN ASSISTANT

## 2024-04-16 PROCEDURE — 93000 ELECTROCARDIOGRAM COMPLETE: CPT | Performed by: PHYSICIAN ASSISTANT

## 2024-04-16 PROCEDURE — U0002 COVID-19 LAB TEST NON-CDC: HCPCS | Performed by: PHYSICIAN ASSISTANT

## 2024-04-16 NOTE — TELEPHONE ENCOUNTER
Please reply to pool: EM RN TRIAGE  Action Requested: Summary for Provider     []  Critical Lab, Recommendations Needed  [] Need Additional Advice  [x]   FYI    []   Need Orders  [] Need Medications Sent to Pharmacy  []  Other     SUMMARY: Patient contacts clinic reporting sinus congestion and headache on right side x 2 weeks.  Denies fever but it is aggravating her vertigo and also causing heart palpitations. Palpitations occurring with activity and sometimes at rest.  Denies chest pain or shortness of breath.  Denies fever.  No acute visits to offer patient, she will go to .      Reason for call: Sinus Problem  Onset: Data Unavailable                       Reason for Disposition   Patient wants to be seen    Protocols used: Sinus Pain or Congestion-A-OH

## 2024-04-16 NOTE — ED PROVIDER NOTES
Patient Seen in: [facility] [place]      History   No chief complaint on file.    Stated Complaint: sinus infection, headache    Subjective:   HPI    Patient is a 73-year-old female with past medical history of diabetes hypertension COPD that presents to immediate care due to possible sinus infection.  Patient states over the past week she has had increasing dizziness, headache and palpitations.  States that she has a history of vertigo and has been taking her meclizine without relief.  States that usually when she has her typical vertigo meclizine resolves her symptoms in 2 to 3 days however has not noticed improvement over the past week.  Patient also notes intermittent palpitations but denying chest pain shortness of breath.  Patient denies sinus congestion, rhinorrhea, acute fever or cough.    Objective:   Past Medical History:    Asthma (HCC)    Chest pain    Diabetes (HCC)    Diverticulosis    Esophageal reflux    Extrinsic asthma, unspecified    History of blood transfusion    Polyp of colon    None in . Repeat CLN in  if patient interested in screening/remains in good health    Pulmonary embolism (HCC)    Sleep apnea              Past Surgical History:   Procedure Laterality Date    Cardiac catheterization      Cholecystectomy      Colonoscopy      Colonoscopy N/A 2017    Procedure: COLONOSCOPY;  Surgeon: RUPERT Infante MD;  Location: [facility] ENDOSCOPY    Colonoscopy N/A 2020    Procedure: COLONOSCOPY;  Surgeon: RUPERT Infante MD;  Location: [facility] ENDOSCOPY    Hysterectomy                  Social History     Socioeconomic History    Marital status:    Tobacco Use    Smoking status: Former     Current packs/day: 0.00     Types: Cigarettes     Start date: 1950     Quit date: 1980     Years since quittin.3    Smokeless tobacco: Never   Vaping Use    Vaping status: Never Used   Substance and Sexual Activity    Alcohol use: Yes     Alcohol/week: 1.0 standard drink of  alcohol     Types: 1 Shots of liquor per week     Comment: \"socially\"    Drug use: No     Social Determinants of Health      [facility]    Social Connections    [facility]    Housing Stability              Review of Systems    Positive for stated complaint: sinus infection, headache  Other systems are as noted in HPI.  Constitutional and vital signs reviewed.      All other systems reviewed and negative except as noted above.    Physical Exam     ED Triage Vitals [04/16/24 1203]   /73   Pulse 80   Resp 20   Temp 97.2 °F (36.2 °C)   Temp src Temporal   SpO2 98 %   O2 Device None (Room air)       Current:/73   Pulse 80   Temp 97.2 °F (36.2 °C) (Temporal)   Resp 20   SpO2 98%         Physical Exam    Vital signs reviewed. Nursing note reviewed.  Constitutional: Well-developed. Well-nourished. In no acute distress  HENT: Mucous membranes moist.   EYES: PERRL. EOMI. Visual fields in tact. Visual acuity grossly normal  NECK: Supple.   CARDIAC: Normal rate. Normal S1/ S2. 2+ distal pulses. No edema  PULM/CHEST: Clear to auscultation bilaterally. No wheezes  ABD: Soft, non-tender, non-distended  : No CVA tenderness.  RECTAL: deferred  Extremities: Full ROM  NEURO: Alert. CN II-XII intact. 5/5 strength in the upper and lower extremities. Normal gait.   SKIN: Warm and dry. No rash or lesions.  PSYCH: Normal judgment. Normal affect.         ED Course     Labs Reviewed   POCT GLUCOSE - Abnormal; Notable for the following components:       Result Value    POC Glucose  135 (*)     All other components within normal limits   RAPID SARS-COV-2 BY PCR - Normal     EKG    Rate, intervals and axes as noted on EKG Report.  Rate: 79 bpm  Rhythm: Sinus Rhythm  Reading: No ectopy, no ST segment changes, no signs of acute ischemia                          MDM      Patient is a 73-year-old  female with past medical history of COPD, diabetes hypertension that presents to immediate care due to persistent dizziness and palpitations x 1 week.  Patient arrives with stable vitals.  Physical exam showing patient neurovascularly intact.  Due to refractory dizziness after taking normally prescribed meclizine differential diagnosis include CVA, vertigo, headache, ACS, electrolyte derangement, less likely bacterial sinusitis as patient denies rhinorrhea or sinus congestion.  Additionally patient complaining of intermittent palpitations, encourage patient to go to nearest emergency department for further evaluation. Point-of-care glucose testing 135 today in immediate care.  History given by patient.  Care discussed with attending Dr. Vasquez                                 Medical Decision Making      Disposition and Plan     Clinical Impression:  1. Dizziness    2. Encounter for screening for COVID-19    3. Palpitations         Disposition:  Ic to ed  4/16/2024 12:35 pm    Follow-up:  No follow-up provider specified.        Medications Prescribed:  Current Discharge Medication List

## 2024-04-16 NOTE — ED INITIAL ASSESSMENT (HPI)
Pt states almost 2 weeks ago began having symptoms, pt states having vertigo, sinus pressure in head, palpitation, fatigue, dizziness. Pt denies NVD. Pt states feels like her blood sugars have been abnormally high recently.

## 2024-04-23 ENCOUNTER — OFFICE VISIT (OUTPATIENT)
Dept: FAMILY MEDICINE CLINIC | Facility: CLINIC | Age: 74
End: 2024-04-23
Payer: MEDICARE

## 2024-04-23 VITALS
BODY MASS INDEX: 34 KG/M2 | SYSTOLIC BLOOD PRESSURE: 121 MMHG | TEMPERATURE: 97 F | DIASTOLIC BLOOD PRESSURE: 74 MMHG | HEART RATE: 76 BPM | OXYGEN SATURATION: 96 % | WEIGHT: 206 LBS | RESPIRATION RATE: 18 BRPM

## 2024-04-23 DIAGNOSIS — I10 PRIMARY HYPERTENSION: ICD-10-CM

## 2024-04-23 DIAGNOSIS — N89.8 VAGINAL DRYNESS: ICD-10-CM

## 2024-04-23 DIAGNOSIS — R00.2 HEART PALPITATIONS: Primary | ICD-10-CM

## 2024-04-23 PROCEDURE — 99214 OFFICE O/P EST MOD 30 MIN: CPT | Performed by: FAMILY MEDICINE

## 2024-04-23 PROCEDURE — G2211 COMPLEX E/M VISIT ADD ON: HCPCS | Performed by: FAMILY MEDICINE

## 2024-04-23 PROCEDURE — 1159F MED LIST DOCD IN RCRD: CPT | Performed by: FAMILY MEDICINE

## 2024-04-23 PROCEDURE — 1160F RVW MEDS BY RX/DR IN RCRD: CPT | Performed by: FAMILY MEDICINE

## 2024-04-23 PROCEDURE — 3078F DIAST BP <80 MM HG: CPT | Performed by: FAMILY MEDICINE

## 2024-04-23 PROCEDURE — 3074F SYST BP LT 130 MM HG: CPT | Performed by: FAMILY MEDICINE

## 2024-04-23 RX ORDER — NYSTATIN 100000 U/G
1 CREAM TOPICAL 2 TIMES DAILY
Qty: 30 G | Refills: 0 | Status: SHIPPED | OUTPATIENT
Start: 2024-04-23 | End: 2024-04-30

## 2024-04-23 NOTE — PROGRESS NOTES
HPI: Silvana is a 73 year old female who presents for ER follow-up for palpitations. Has been having them for over a month.  Went to UC and then to ER.  Labs and testing normal.  Have not stopped.  Gets more with moving around. Happen all day.  Intermittent. Had one episode on Saturday which lasted about 15 min. No other symptoms. Pt states that she read on the internet that Losartan causes irregular heart rhythms so she would like to come off of it. Has not taken medication today.     Has vaginal itching for the past month.  Bought Vagisil in the past month which helps. No discharge. Just itching on outside. No vaginal dryness. Not sexually active. Has incontinence and wears pads.     PMH:    Past Medical History:    Asthma (HCC)    Chest pain    Diabetes (HCC)    Diverticulosis    Esophageal reflux    Extrinsic asthma, unspecified    History of blood transfusion    Polyp of colon    None in 2020. Repeat CLN in 2025 if patient interested in screening/remains in good health    Pulmonary embolism (HCC)    Sleep apnea      Alg:  Penicillins   Meds:   Current Outpatient Medications on File Prior to Visit   Medication Sig Dispense Refill    fluticasone-salmeterol 250-50 MCG/ACT Inhalation Aerosol Powder, Breath Activated INHALE ONE PUFF BY MOUTH EVERY 12 HOURS 60 each 3    Accu-Chek Softclix Lancets Does not apply Misc Check blood glucose once daily. 100 each 1    omeprazole 20 MG Oral Capsule Delayed Release Take 1 capsule (20 mg total) by mouth every morning. 90 capsule 3    albuterol 108 (90 Base) MCG/ACT Inhalation Aero Soln Inhale 2 puffs into the lungs every 4 (four) hours as needed for Wheezing. 1 each 3    fluticasone propionate (FLONASE) 50 MCG/ACT Nasal Suspension 2 sprays by Nasal route daily. 3 each 3    losartan 25 MG Oral Tab Take 1 tablet (25 mg total) by mouth daily. 90 tablet 1    metFORMIN 500 MG Oral Tab Take 1 tablet (500 mg total) by mouth 2 (two) times daily with meals. 180 tablet 1     Spacer/Aero-Holding Chambers (COMPACT SPACE CHAMBER) Does not apply Device TO BE USED WITH ALBUTEROL, INHALE 2 PUFFS EVERY 4 HOURS AS NEEDED      Glucose Blood (ACCU-CHEK LORRAINE PLUS) In Vitro Strip Check blood sugar once daily 100 strip 3    pravastatin 10 MG Oral Tab Take 1 tablet (10 mg total) by mouth nightly. 90 tablet 3    montelukast 10 MG Oral Tab Take 1 tablet (10 mg total) by mouth nightly. At Bedtime 90 tablet 3    lidocaine (LIDO DEION) 4 % External Patch Place 1 patch onto the skin daily. 15 patch 2    meclizine 25 MG Oral Tab Take 1 tablet (25 mg total) by mouth 2 (two) times daily as needed. 60 tablet 0    Lancets Misc. Does not apply Misc To check blood sugar once daily 100 each 3    Glucose Blood In Vitro Strip place 1 by Misc.(Non-Drug; Combo Route) route 3 times every day with glucose monitor 100 strip 3    Blood Glucose Monitoring Suppl (BLOOD GLUCOSE MONITOR SYSTEM) w/Device Does not apply Kit One Touch machine. Please check accucheck once daily 1 kit 0    aspirin 81 MG Oral Tab Take 1 tablet (81 mg total) by mouth daily.      Cyanocobalamin (VITAMIN B-12 CR OR) Take  by mouth.      Multiple Vitamins-Minerals (CENTRUM SILVER) Oral Tab Take  by mouth. Take one tablet by mouth daily       No current facility-administered medications on file prior to visit.      Tobacco Use: no    Objective:   Gen: AOx3. NAD.   /74   Pulse 76   Temp 97.4 °F (36.3 °C) (Temporal)   Resp 18   Wt 206 lb (93.4 kg)   SpO2 96%   BMI 34.28 kg/m²   CV:  Regular rate and rhythm; no murmurs  Lungs:  Clear to ausculation; good aeration               No wheezes, rales or rhonchi      Assessment:/Plan:  Encounter Diagnoses   Name Primary?    Heart palpitations    Intermittent.  Feels it is due to Losartan. Will stop and if no improvement in one week, get Holter monitor scheduled.  Increase water and exercise.  Schedule echo to evaluate heart structure.    Yes    Primary hypertension    Very controlled off medication.   Will hold Losartan for now and reassess.        Vaginal dryness    Suspect yeast infection as she does have incontinence.  Advised to change pad frequently.       Colleen Weiler, DO

## 2024-05-02 ENCOUNTER — TELEPHONE (OUTPATIENT)
Dept: FAMILY MEDICINE CLINIC | Facility: CLINIC | Age: 74
End: 2024-05-02

## 2024-05-02 NOTE — TELEPHONE ENCOUNTER
I do not see a link between Metformin and palpitations and I have not had patients have palpitations from Metformin so I prefer we continue for now

## 2024-05-02 NOTE — TELEPHONE ENCOUNTER
Dr. Weiler, patient states she is not comfortable taking Metformin after what she read. She is requesting new medication to the pharmacy, please advise. Thank you.    Patient contacted (name and  of patient verified). Dr. Weiler's message reviewed. Patient states \"I googled it and in red it shows metformin causes heart palpitations.\" Reassured patient based on clinical resources and clinical experience, Dr. Weiler feels it is safe to continue taking. Patient states she is not comfortable taking it much longer. Prefers different treatment option to the pharmacy. Will continue to take metformin and await further instructions from Dr. Weiler.

## 2024-05-02 NOTE — TELEPHONE ENCOUNTER
DR Weiler:    Patient seen 4/23/24 for palpitations.   Patient is asking if she can get off Metformin and be placed on something else.     She read that metformin can cause palpitations.

## 2024-05-03 NOTE — TELEPHONE ENCOUNTER
Patient advised of Dr.Weiler's note and verbalized understanding. Patient scheduled for appointment 5/28/24.    Dr.Weiler, is it ok to keep this appointment or use res 24  for sooner appointment?

## 2024-05-06 NOTE — TELEPHONE ENCOUNTER
Spoke with patient and appt moved up.  Nothing further at this time.     Future Appointments   Date Time Provider Department Center   5/8/2024  3:30 PM [facility]   5/21/2024  2:15 PM EM CARD RM2 ECHO EM NI CARD  [facility] [place]   5/21/2024  3:30 PM EM CARD RM4 HOLTER EM NI CARD  [facility] [place]   7/10/2024  1:00 PM Weiler, Colleen M, DO [facility]

## 2024-05-08 ENCOUNTER — OFFICE VISIT (OUTPATIENT)
Dept: FAMILY MEDICINE CLINIC | Facility: CLINIC | Age: 74
End: 2024-05-08
Payer: MEDICARE

## 2024-05-08 VITALS
WEIGHT: 205.19 LBS | TEMPERATURE: 96 F | DIASTOLIC BLOOD PRESSURE: 70 MMHG | BODY MASS INDEX: 34 KG/M2 | HEART RATE: 90 BPM | SYSTOLIC BLOOD PRESSURE: 151 MMHG

## 2024-05-08 DIAGNOSIS — J01.11 ACUTE RECURRENT FRONTAL SINUSITIS: Primary | ICD-10-CM

## 2024-05-08 DIAGNOSIS — R00.2 HEART PALPITATIONS: ICD-10-CM

## 2024-05-08 PROCEDURE — 99214 OFFICE O/P EST MOD 30 MIN: CPT | Performed by: FAMILY MEDICINE

## 2024-05-08 PROCEDURE — 1159F MED LIST DOCD IN RCRD: CPT | Performed by: FAMILY MEDICINE

## 2024-05-08 PROCEDURE — 1160F RVW MEDS BY RX/DR IN RCRD: CPT | Performed by: FAMILY MEDICINE

## 2024-05-08 PROCEDURE — 3078F DIAST BP <80 MM HG: CPT | Performed by: FAMILY MEDICINE

## 2024-05-08 PROCEDURE — 3077F SYST BP >= 140 MM HG: CPT | Performed by: FAMILY MEDICINE

## 2024-05-08 RX ORDER — DOXYCYCLINE HYCLATE 100 MG
100 TABLET ORAL 2 TIMES DAILY
Qty: 20 TABLET | Refills: 0 | Status: SHIPPED | OUTPATIENT
Start: 2024-05-08 | End: 2024-05-18

## 2024-05-08 NOTE — PROGRESS NOTES
HPI: Silvana is a 73 year old female who presents for head congestion and sinus pressure.  Feels fatigued.  Using Flonase and Singulair at night. Decreased hearing.  Hears ringing. Last infection was in February.     Pt feel like she is still having palpitations. Has echocardiogram and holter monitor scheduled. Walks on the treadmill and only get 1-2 palpitations.     PMH:    Past Medical History:    Asthma (HCC)    Chest pain    Diabetes (HCC)    Diverticulosis    Esophageal reflux    Extrinsic asthma, unspecified    History of blood transfusion    Polyp of colon    None in 2020. Repeat CLN in 2025 if patient interested in screening/remains in good health    Pulmonary embolism (HCC)    Sleep apnea      Alg:  Penicillins   Meds:   Current Outpatient Medications on File Prior to Visit   Medication Sig Dispense Refill    fluticasone-salmeterol 250-50 MCG/ACT Inhalation Aerosol Powder, Breath Activated INHALE ONE PUFF BY MOUTH EVERY 12 HOURS 60 each 3    Accu-Chek Softclix Lancets Does not apply Misc Check blood glucose once daily. 100 each 1    omeprazole 20 MG Oral Capsule Delayed Release Take 1 capsule (20 mg total) by mouth every morning. 90 capsule 3    albuterol 108 (90 Base) MCG/ACT Inhalation Aero Soln Inhale 2 puffs into the lungs every 4 (four) hours as needed for Wheezing. 1 each 3    fluticasone propionate (FLONASE) 50 MCG/ACT Nasal Suspension 2 sprays by Nasal route daily. 3 each 3    metFORMIN 500 MG Oral Tab Take 1 tablet (500 mg total) by mouth 2 (two) times daily with meals. 180 tablet 1    Spacer/Aero-Holding Chambers (COMPACT SPACE CHAMBER) Does not apply Device TO BE USED WITH ALBUTEROL, INHALE 2 PUFFS EVERY 4 HOURS AS NEEDED      Glucose Blood (ACCU-CHEK LORRAINE PLUS) In Vitro Strip Check blood sugar once daily 100 strip 3    pravastatin 10 MG Oral Tab Take 1 tablet (10 mg total) by mouth nightly. 90 tablet 3    montelukast 10 MG Oral Tab Take 1 tablet (10 mg total) by mouth nightly. At Bedtime 90  tablet 3    lidocaine (LIDO DEION) 4 % External Patch Place 1 patch onto the skin daily. 15 patch 2    meclizine 25 MG Oral Tab Take 1 tablet (25 mg total) by mouth 2 (two) times daily as needed. 60 tablet 0    Lancets Misc. Does not apply Misc To check blood sugar once daily 100 each 3    Glucose Blood In Vitro Strip place 1 by Misc.(Non-Drug; Combo Route) route 3 times every day with glucose monitor 100 strip 3    Blood Glucose Monitoring Suppl (BLOOD GLUCOSE MONITOR SYSTEM) w/Device Does not apply Kit One Touch machine. Please check accucheck once daily 1 kit 0    aspirin 81 MG Oral Tab Take 1 tablet (81 mg total) by mouth daily.      Cyanocobalamin (VITAMIN B-12 CR OR) Take  by mouth.      Multiple Vitamins-Minerals (CENTRUM SILVER) Oral Tab Take  by mouth. Take one tablet by mouth daily       No current facility-administered medications on file prior to visit.      Tobacco Use: no    Objective:   Gen: AOx3. NAD.  /70 (BP Location: Left arm, Patient Position: Sitting, Cuff Size: adult)   Pulse 90   Temp (!) 95.6 °F (35.3 °C)   Wt 205 lb 3.2 oz (93.1 kg)   BMI 34.15 kg/m²   HEENT: Conjunctive clear.  Ismael ear canals clear.  Ismael TMs intact with good landmarks noted.  Nares patent.  Tenderness noted to frontal sinus. Oral mucous membrane moist.  Normal lips, teeth, and gums.  Oropharynx normal.  Neck supple.  Good ROM.  No LAD.  Thyroid normal.  CV:  Regular rate and rhythm; no murmurs  Lungs:  Clear to ausculation; good aeration               No wheezes, rales or rhonchi      Assessment:/Plan:  Encounter Diagnoses   Name Primary?    Acute recurrent frontal sinusitis    Start Doxycycline as patient has tolerated it in the past.    Yes    Heart palpitations    Has been having more palpitations.  Pt feels like it is due to Metformin. Discussed that this is not a typical side effect. Will refer to cardiology to discuss palpitations.       Colleen Weiler, DO

## 2024-05-21 ENCOUNTER — HOSPITAL ENCOUNTER (OUTPATIENT)
Dept: CV DIAGNOSTICS | Facility: HOSPITAL | Age: 74
Discharge: HOME | End: 2024-05-21
Attending: FAMILY MEDICINE

## 2024-05-21 DIAGNOSIS — R00.2 HEART PALPITATIONS: ICD-10-CM

## 2024-05-21 PROCEDURE — 93246 EXT ECG>7D<15D RECORDING: CPT | Performed by: FAMILY MEDICINE

## 2024-05-21 PROCEDURE — 93306 TTE W/DOPPLER COMPLETE: CPT | Performed by: FAMILY MEDICINE

## 2024-05-21 PROCEDURE — 93247 EXT ECG>7D<15D SCAN A/R: CPT | Performed by: FAMILY MEDICINE

## 2024-05-21 RX ORDER — BLOOD SUGAR DIAGNOSTIC
STRIP MISCELLANEOUS
Qty: 100 STRIP | Refills: 3 | OUTPATIENT
Start: 2024-05-21

## 2024-05-22 RX ORDER — BLOOD SUGAR DIAGNOSTIC
STRIP MISCELLANEOUS
Qty: 2 STRIP | Refills: 5 | Status: SHIPPED | OUTPATIENT
Start: 2024-05-22 | End: 2024-05-24

## 2024-05-22 NOTE — TELEPHONE ENCOUNTER
Patient states that she is out of her test strips early because she is testing twice daily but the sig is for once daily.  Please review pended prescription.

## 2024-05-24 RX ORDER — BLOOD SUGAR DIAGNOSTIC
STRIP MISCELLANEOUS
Qty: 200 STRIP | Refills: 3 | Status: SHIPPED | OUTPATIENT
Start: 2024-05-24

## 2024-05-24 NOTE — TELEPHONE ENCOUNTER
Patient called (identified name and ),   Asking to get new script for test strips, because she tests twice a day and has run out.  Pended and routed to Dr Munguia, because Dr Weiler is out of the office today.           Disp Refills Start End    Glucose Blood (ACCU-CHEK LORRAINE PLUS) In Vitro Strip 2 strip 5 2024 --    Sig: Check blood sugar once daily    Sent to pharmacy as: Accu-Chek Lorraine Plus In Vitro Strip    Notes to Pharmacy: Pt is non insulin dependent diabetic. DiaPgnosis code E11.9.    E-Prescribing Status: Receipt confirmed by pharmacy (2024  2:46 PM CDT)      Pharmacy    [id number] - [place], [place] - [address] [phone], [phone]

## 2024-05-30 NOTE — TELEPHONE ENCOUNTER
[facility]  [address]  [place] [address]    INDIVIDUAL THERAPY NOTE    NAME: Jose Andrea    DATE: 5/29/2024    TYPE OF SERVICE: Individual Therapy    LOCATION OF SERVICE: In Office    TOTAL TIME: 60 minutes    DIAGNOSIS:    1. Complex posttraumatic stress disorder    2. Severe episode of recurrent major depressive disorder, without psychotic features (HCC)    3. Attention deficit hyperactivity disorder (ADHD), combined type      MENTAL STATUS EXAM:  Pt appears well nourished,  Pt was appropriately dressed and groomed.  Attention span was age appropriate. Insight and judgment were age appropriate.  Speech pattern was even.  No bizarre or psychotic behaviors were observed. Motor coordination was good. Pt.s eye content was good and manner of relating appeared developmentally within normal range.    MOOD AND AFFECT: Euthymic with congruent affect    THOUGHT PROCESS: Goal-directed and logical    PLAN: CBT may be used to address goals.    Pt is progressing on goals.    Pt will decrease his symptoms of depression and anxiety by recognizing cognitive distortions and positively reframing them as evidenced by self-report and lower PHQ and DIANE scores.       SUMMARY OF SERVICE: Patient returns for follow-up individual therapy session with provider. Provider administered PHQ-9 and DIANE-7 assessments to pt. Pt's PHQ-9 and DIANE-7 scores are 24 and 21 respectively indicating severe depression and severe anxiety. Pt denied SI/HI/AVH. Provider assisted pt with processing sources/triggers for current symptoms.      Supportive therapy provided for identified psychosocial stressors to improve the pt's self-esteem, psychological functioning and adaptive skills. Patient discussed certain situational and personal stressors ongoing in her life at this time. Patient allowed to vent about the negative impact of stressors.      FOLLOW UP: Return in about 2 weeks (around 6/12/2024).    DEAN Hooper  Signed Prescriptions Disp Refills    Glucose Blood (CONNER CONTOUR NEXT TEST) In Vitro Strip 100 strip 3      Sig: For testing blood sugar once daily        Authorizing Provider: Margo Crowley        Ordering User: Luis Daniel New      Blood Glucose [facility]

## 2024-06-11 RX ORDER — MONTELUKAST SODIUM 10 MG/1
10 TABLET ORAL NIGHTLY
Qty: 90 TABLET | Refills: 3 | Status: SHIPPED | OUTPATIENT
Start: 2024-06-11

## 2024-06-11 NOTE — TELEPHONE ENCOUNTER
Refill Per Protocol     Requested Prescriptions   Pending Prescriptions Disp Refills    MONTELUKAST 10 MG Oral Tab [Pharmacy Med Name: Montelukast Sodium 10 Mg Tab Auro] 90 tablet 0     Sig: Take 1 tablet (10 mg total) by mouth nightly. At Bedtime       Asthma & COPD Medication Protocol Passed - 6/7/2024  1:30 AM        Passed - Asthma Action Score greater than or equal to 20        Passed - Appointment in past 6 or next 3 months      Recent Outpatient Visits              1 month ago Acute recurrent frontal sinusitis    [facility] Weiler, Colleen M,     Office Visit    1 month ago Heart palpitations    [facility] Weiler, Colleen M, DO    Office Visit    2 months ago Type 2 diabetes mellitus without retinopathy (HCC)    [facility] Ar Lund DPM    Office Visit    3 months ago Type 2 diabetes mellitus without retinopathy (HCC)    [facility] Weiler, Colleen M,     Office Visit    11 months ago Type 2 diabetes mellitus without retinopathy (HCC)    [facility] Neno Bailey MD    Office Visit          Future Appointments         Provider Department Appt Notes    In 3 days Bárbara Ortiz MD [facility] sinus  informed to bring id/ins    In 4 weeks Weiler, Colleen M, DO [facility] annual Medicare wellness exam                    Passed - AAP/ACT given in last 12 months     Yes  Yes  No data recorded  20                 Future Appointments         Provider Department Appt Notes    In 3 days Bárbara Ortiz MD [facility] sinus  informed to bring id/ins    In 4 weeks Weiler, Colleen M, DO [facility] annual Medicare wellness exam           Recent Outpatient Visits              1 month ago Acute recurrent frontal sinusitis    [facility] Weiler, Colleen M, DO    Office Visit    1 month ago Heart palpitations    [facility] Weiler, Colleen M, DO    Office Visit    2 months ago Type 2 diabetes mellitus without retinopathy (HCC)    [facility] [place]    Office Visit    3 months ago Type 2 diabetes mellitus without retinopathy (HCC)    [facility] [place] Weiler, Colleen M,     Office Visit    11 months ago Type 2 diabetes mellitus without retinopathy (HCC)    [facility] [place]    Office Visit

## 2024-06-14 ENCOUNTER — OFFICE VISIT (OUTPATIENT)
Dept: OTOLARYNGOLOGY | Facility: CLINIC | Age: 74
End: 2024-06-14
Payer: MEDICARE

## 2024-06-14 VITALS — BODY MASS INDEX: 35 KG/M2 | WEIGHT: 205 LBS | HEIGHT: 64 IN

## 2024-06-14 DIAGNOSIS — J34.89 SINUS PRESSURE: ICD-10-CM

## 2024-06-14 DIAGNOSIS — J34.3 NASAL TURBINATE HYPERTROPHY: ICD-10-CM

## 2024-06-14 DIAGNOSIS — H93.13 TINNITUS OF BOTH EARS: Primary | ICD-10-CM

## 2024-06-14 RX ORDER — METOPROLOL SUCCINATE 25 MG/1
25 TABLET, EXTENDED RELEASE ORAL DAILY
COMMUNITY
Start: 2024-05-30

## 2024-06-14 RX ORDER — LOSARTAN POTASSIUM 25 MG/1
25 TABLET ORAL DAILY
COMMUNITY
Start: 2024-05-30

## 2024-06-15 NOTE — PROGRESS NOTES
Silvana Morales is a 73 year old female.   Chief Complaint   Patient presents with    Sinus Problem     Sinus issues     HPI:   Patient here with sinus pressure this is better after the doxycycline.  Reports tinnitus in both ears.    Current Outpatient Medications   Medication Sig Dispense Refill    losartan 25 MG Oral Tab Take 1 tablet (25 mg total) by mouth daily.      metoprolol succinate ER 25 MG Oral Tablet 24 Hr Take 1 tablet (25 mg total) by mouth daily.      montelukast 10 MG Oral Tab Take 1 tablet (10 mg total) by mouth nightly. At Bedtime 90 tablet 3    Glucose Blood (ACCU-CHEK LORRAINE PLUS) In Vitro Strip Check blood sugar twice daily 200 strip 3    fluticasone-salmeterol 250-50 MCG/ACT Inhalation Aerosol Powder, Breath Activated INHALE ONE PUFF BY MOUTH EVERY 12 HOURS 60 each 3    Accu-Chek Softclix Lancets Does not apply Misc Check blood glucose once daily. 100 each 1    omeprazole 20 MG Oral Capsule Delayed Release Take 1 capsule (20 mg total) by mouth every morning. 90 capsule 3    albuterol 108 (90 Base) MCG/ACT Inhalation Aero Soln Inhale 2 puffs into the lungs every 4 (four) hours as needed for Wheezing. 1 each 3    fluticasone propionate (FLONASE) 50 MCG/ACT Nasal Suspension 2 sprays by Nasal route daily. 3 each 3    metFORMIN 500 MG Oral Tab Take 1 tablet (500 mg total) by mouth 2 (two) times daily with meals. 180 tablet 1    Spacer/Aero-Holding Chambers (COMPACT SPACE CHAMBER) Does not apply Device TO BE USED WITH ALBUTEROL, INHALE 2 PUFFS EVERY 4 HOURS AS NEEDED      pravastatin 10 MG Oral Tab Take 1 tablet (10 mg total) by mouth nightly. 90 tablet 3    lidocaine (LIDO DEION) 4 % External Patch Place 1 patch onto the skin daily. 15 patch 2    meclizine 25 MG Oral Tab Take 1 tablet (25 mg total) by mouth 2 (two) times daily as needed. 60 tablet 0    Lancets Misc. Does not apply Misc To check blood sugar once daily 100 each 3    Glucose Blood In Vitro Strip place 1 by Misc.(Non-Drug; Combo Route)  route 3 times every day with glucose monitor 100 strip 3    Blood Glucose Monitoring Suppl (BLOOD GLUCOSE MONITOR SYSTEM) w/Device Does not apply Kit One Touch machine. Please check accucheck once daily 1 kit 0    aspirin 81 MG Oral Tab Take 1 tablet (81 mg total) by mouth daily.      Cyanocobalamin (VITAMIN B-12 CR OR) Take  by mouth.      Multiple Vitamins-Minerals (CENTRUM SILVER) Oral Tab Take  by mouth. Take one tablet by mouth daily        Past Medical History:    Asthma (HCC)    Chest pain    Diabetes (HCC)    Diverticulosis    Esophageal reflux    Extrinsic asthma, unspecified    History of blood transfusion    Polyp of colon    None in . Repeat CLN in  if patient interested in screening/remains in good health    Pulmonary embolism (HCC)    Sleep apnea      Social History:  Social History     Socioeconomic History    Marital status:    Tobacco Use    Smoking status: Former     Current packs/day: 0.00     Types: Cigarettes     Start date: 1950     Quit date: 1980     Years since quittin.4    Smokeless tobacco: Never   Vaping Use    Vaping status: Never Used   Substance and Sexual Activity    Alcohol use: Yes     Alcohol/week: 1.0 standard drink of alcohol     Types: 1 Shots of liquor per week     Comment: \"socially\"    Drug use: No     Social Determinants of Health      [facility]    Social Connections    Received from [facility], [facility]    Housing Stability        REVIEW OF SYSTEMS:   GENERAL HEALTH: feels well otherwise  GENERAL : denies fever, chills, sweats, weight loss, weight gain  SKIN: denies any unusual skin lesions or rashes  RESPIRATORY: denies shortness of breath with exertion  NEURO: denies headaches    EXAM:   Ht 5' 4\" (1.626 m)   Wt 205 lb (93 kg)   BMI 35.19 kg/m²   System Details   Skin Inspection - Normal.   Constitutional Overall appearance - Normal.    Head/Face Facial features - Normal. Eyebrows - Normal. Skull - Normal.   Eyes Conjunctiva - Right: Normal, Left: Normal. Pupil - Right: Normal, Left: Normal.    Ears Inspection - Right: Normal, Left: Normal.   Canal - Right: Normal, Left: Normal.   TM - Right: Normal, Left: Normal.  Fork 512 Hz right louder than left   Nasal External nose - Normal.   Nasal septum - Normal.  Turbinates -congestion but no purulence or polyps noted   Oral/Oropharynx Lips - Normal, Tonsils - Normal, Tongue - Normal    Neck Exam Inspection - Normal. Palpation - Normal. Parotid gland - Normal. Thyroid gland - Normal.  No carotid bruits by auscultation   Lymph Detail Submental. Submandibular. Anterior cervical. Posterior cervical. Supraclavicular all without enlargement   Psychiatric Orientation - Oriented to time, place, person & situation. Appropriate mood and affect.   Neurological Memory - Normal. Cranial nerves - Cranial nerves II through XII grossly intact.     ASSESSMENT AND PLAN:   1. Tinnitus of both ears  Audiogram ordered to better evaluate underlying hearing loss.  Also possible asymmetric hearing loss.  Patient to reduce sodium and caffeine.  - Audiology Referral - [place] ([facility])    2. Sinus pressure  Flonase and Singulair.    3. Nasal turbinate hypertrophy  No evidence of sinusitis on the date of the visit.      The patient indicates understanding of these issues and agrees to the plan.      Bárbara Ortiz MD  6/14/2024  9:22 PM

## 2024-06-15 NOTE — PATIENT INSTRUCTIONS
Ordered an audiogram to better evaluate your underlying hearing with your tinnitus.  With tinnitus you can reduce your sodium and caffeine.  Continue Singulair and Flonase for your nasal congestion.  No evidence of sinusitis on the day to your visit.

## 2024-07-10 ENCOUNTER — OFFICE VISIT (OUTPATIENT)
Dept: FAMILY MEDICINE CLINIC | Facility: CLINIC | Age: 74
End: 2024-07-10
Payer: MEDICARE

## 2024-07-10 ENCOUNTER — LAB ENCOUNTER (OUTPATIENT)
Dept: LAB | Age: 74
End: 2024-07-10
Attending: FAMILY MEDICINE
Payer: MEDICARE

## 2024-07-10 VITALS
BODY MASS INDEX: 36.05 KG/M2 | SYSTOLIC BLOOD PRESSURE: 100 MMHG | RESPIRATION RATE: 16 BRPM | TEMPERATURE: 98 F | HEIGHT: 63.5 IN | DIASTOLIC BLOOD PRESSURE: 66 MMHG | HEART RATE: 66 BPM | WEIGHT: 206 LBS

## 2024-07-10 DIAGNOSIS — E66.01 SEVERE OBESITY (BMI 35.0-39.9) WITH COMORBIDITY (HCC): Chronic | ICD-10-CM

## 2024-07-10 DIAGNOSIS — J44.89 ASTHMA WITH COPD (CHRONIC OBSTRUCTIVE PULMONARY DISEASE) (HCC): Chronic | ICD-10-CM

## 2024-07-10 DIAGNOSIS — H25.13 AGE-RELATED NUCLEAR CATARACT OF BOTH EYES: ICD-10-CM

## 2024-07-10 DIAGNOSIS — E28.39 ESTROGEN DEFICIENCY: ICD-10-CM

## 2024-07-10 DIAGNOSIS — Z00.00 ENCOUNTER FOR ANNUAL HEALTH EXAMINATION: Primary | ICD-10-CM

## 2024-07-10 DIAGNOSIS — E11.9 TYPE 2 DIABETES MELLITUS WITHOUT RETINOPATHY (HCC): ICD-10-CM

## 2024-07-10 DIAGNOSIS — Z12.31 SCREENING MAMMOGRAM FOR BREAST CANCER: ICD-10-CM

## 2024-07-10 DIAGNOSIS — G47.33 OBSTRUCTIVE SLEEP APNEA: ICD-10-CM

## 2024-07-10 DIAGNOSIS — K57.90 DIVERTICULOSIS: ICD-10-CM

## 2024-07-10 DIAGNOSIS — Z00.00 ENCOUNTER FOR ANNUAL HEALTH EXAMINATION: ICD-10-CM

## 2024-07-10 PROBLEM — J44.9 CHRONIC OBSTRUCTIVE PULMONARY DISEASE (HCC): Status: RESOLVED | Noted: 2018-02-22 | Resolved: 2024-07-10

## 2024-07-10 LAB
ALBUMIN SERPL-MCNC: 4.8 G/DL (ref 3.2–4.8)
ALBUMIN/GLOB SERPL: 1.4 {RATIO} (ref 1–2)
ALP LIVER SERPL-CCNC: 83 U/L
ALT SERPL-CCNC: 22 U/L
ANION GAP SERPL CALC-SCNC: 6 MMOL/L (ref 0–18)
AST SERPL-CCNC: 21 U/L (ref ?–34)
BILIRUB SERPL-MCNC: 0.5 MG/DL (ref 0.2–1.1)
BUN BLD-MCNC: 16 MG/DL (ref 9–23)
BUN/CREAT SERPL: 18.4 (ref 10–20)
CALCIUM BLD-MCNC: 10.5 MG/DL (ref 8.7–10.4)
CHLORIDE SERPL-SCNC: 105 MMOL/L (ref 98–112)
CHOLEST SERPL-MCNC: 143 MG/DL (ref ?–200)
CO2 SERPL-SCNC: 30 MMOL/L (ref 21–32)
CREAT BLD-MCNC: 0.87 MG/DL
CREAT UR-SCNC: 110.1 MG/DL
DEPRECATED RDW RBC AUTO: 45.5 FL (ref 35.1–46.3)
EGFRCR SERPLBLD CKD-EPI 2021: 70 ML/MIN/1.73M2 (ref 60–?)
ERYTHROCYTE [DISTWIDTH] IN BLOOD BY AUTOMATED COUNT: 15.1 % (ref 11–15)
EST. AVERAGE GLUCOSE BLD GHB EST-MCNC: 154 MG/DL (ref 68–126)
FASTING PATIENT LIPID ANSWER: NO
FASTING STATUS PATIENT QL REPORTED: NO
GLOBULIN PLAS-MCNC: 3.5 G/DL (ref 2–3.5)
GLUCOSE BLD-MCNC: 94 MG/DL (ref 70–99)
HBA1C MFR BLD: 7 % (ref ?–5.7)
HCT VFR BLD AUTO: 43.6 %
HDLC SERPL-MCNC: 47 MG/DL (ref 40–59)
HGB BLD-MCNC: 14.4 G/DL
LDLC SERPL CALC-MCNC: 80 MG/DL (ref ?–100)
MCH RBC QN AUTO: 27.8 PG (ref 26–34)
MCHC RBC AUTO-ENTMCNC: 33 G/DL (ref 31–37)
MCV RBC AUTO: 84.2 FL
MICROALBUMIN UR-MCNC: 0.4 MG/DL
MICROALBUMIN/CREAT 24H UR-RTO: 3.6 UG/MG (ref ?–30)
NONHDLC SERPL-MCNC: 96 MG/DL (ref ?–130)
OSMOLALITY SERPL CALC.SUM OF ELEC: 293 MOSM/KG (ref 275–295)
PLATELET # BLD AUTO: 286 10(3)UL (ref 150–450)
POTASSIUM SERPL-SCNC: 4.1 MMOL/L (ref 3.5–5.1)
PROT SERPL-MCNC: 8.3 G/DL (ref 5.7–8.2)
RBC # BLD AUTO: 5.18 X10(6)UL
SODIUM SERPL-SCNC: 141 MMOL/L (ref 136–145)
TRIGL SERPL-MCNC: 84 MG/DL (ref 30–149)
VLDLC SERPL CALC-MCNC: 13 MG/DL (ref 0–30)
WBC # BLD AUTO: 9.5 X10(3) UL (ref 4–11)

## 2024-07-10 PROCEDURE — 83036 HEMOGLOBIN GLYCOSYLATED A1C: CPT

## 2024-07-10 PROCEDURE — 96160 PT-FOCUSED HLTH RISK ASSMT: CPT | Performed by: FAMILY MEDICINE

## 2024-07-10 PROCEDURE — 3078F DIAST BP <80 MM HG: CPT | Performed by: FAMILY MEDICINE

## 2024-07-10 PROCEDURE — 1159F MED LIST DOCD IN RCRD: CPT | Performed by: FAMILY MEDICINE

## 2024-07-10 PROCEDURE — G0439 PPPS, SUBSEQ VISIT: HCPCS | Performed by: FAMILY MEDICINE

## 2024-07-10 PROCEDURE — 80061 LIPID PANEL: CPT

## 2024-07-10 PROCEDURE — 85027 COMPLETE CBC AUTOMATED: CPT

## 2024-07-10 PROCEDURE — 1126F AMNT PAIN NOTED NONE PRSNT: CPT | Performed by: FAMILY MEDICINE

## 2024-07-10 PROCEDURE — 3061F NEG MICROALBUMINURIA REV: CPT | Performed by: FAMILY MEDICINE

## 2024-07-10 PROCEDURE — 82570 ASSAY OF URINE CREATININE: CPT

## 2024-07-10 PROCEDURE — 36415 COLL VENOUS BLD VENIPUNCTURE: CPT

## 2024-07-10 PROCEDURE — 82043 UR ALBUMIN QUANTITATIVE: CPT

## 2024-07-10 PROCEDURE — 3051F HG A1C>EQUAL 7.0%<8.0%: CPT | Performed by: FAMILY MEDICINE

## 2024-07-10 PROCEDURE — 3008F BODY MASS INDEX DOCD: CPT | Performed by: FAMILY MEDICINE

## 2024-07-10 PROCEDURE — 80053 COMPREHEN METABOLIC PANEL: CPT

## 2024-07-10 PROCEDURE — 3074F SYST BP LT 130 MM HG: CPT | Performed by: FAMILY MEDICINE

## 2024-07-10 PROCEDURE — 1170F FXNL STATUS ASSESSED: CPT | Performed by: FAMILY MEDICINE

## 2024-07-10 NOTE — PROGRESS NOTES
Subjective:   Silvana Morales is a 73 year old female who presents for a MA AHA (Medicare Advantage Annual Health Assessment) and Medicare Wellness Visit charge within the last 11 months and Patient may not meet criteria for AWV: Please evaluate for correct coding and scheduled follow up of multiple significant but stable problems.        73 yr old female who presents for Medicare physical. .  Traveling to [place] to visit son. Living with grandson and great grandson- age 7.  Walking regularly.  Eating healthy.     No further palpitations.  Saw cardiologist and started Metoprolol. Will be getting stress test.     Asthma well-controlled.  Taking Adviar daily. Uses Albuterol when she exercises.     Last colonoscopy in 2020.  Due in September 2025 if she wants to continue.     Due for mammogram in September.     DM controlled. Last A1C was 7.4. Saw Podiatry in March.  Needs regular foot care. Needs to see Ophtho.     Due for Dexa scan.  Has been 7 years since last which was normal.  Had lumbar fracture in 2022.     Plans to get Shingles vaccines at clinic.     History/Other:   Fall Risk Assessment:   She has been screened for Falls and is High Risk. Fall Prevention information provided to patient in After Visit Summary.    Do you feel unsteady when standing or walking?: Yes  Do you worry about falling?: Yes  Have you fallen in the past year?: No     Cognitive Assessment:   She had a completely normal cognitive assessment - see flowsheet entries     Functional Ability/Status:   Silvana Morales has some abnormal functions as listed below:  She has Hearing problems based on screening of functional status.She has Vision problems based on screening of functional status. She has problems with Memory based on screening of functional status.       Depression Screening (PHQ):  PHQ-2 SCORE: 0  , done 7/10/2024             Advanced Directives:   She does NOT have a Living Will. [Do you have a living will?: No]  She does NOT  have a Power of  for Health Care. [Do you have a healthcare power of ?: No]  Discussed Advance Care Planning with patient (and family/surrogate if present). Standard forms made available to patient in After Visit Summary.      Patient Active Problem List   Diagnosis    Obstructive sleep apnea    Asthma with COPD (chronic obstructive pulmonary disease) (HCC)    Severe obesity (BMI 35.0-39.9) with comorbidity (HCC)    Type 2 diabetes mellitus without retinopathy (HCC)    Age-related nuclear cataract of both eyes    Diverticulosis     Allergies:  She is allergic to penicillins.    Current Medications:  No outpatient medications have been marked as taking for the 7/10/24 encounter (Office Visit) with Weiler, Colleen M, DO.       Medical History:  She  has a past medical history of Asthma ([facility]), Chest pain, Diabetes (HCC), Diverticulosis, Esophageal reflux, Extrinsic asthma, unspecified, History of blood transfusion, Polyp of colon (07/2017), Pulmonary embolism ([facility]), and Sleep apnea.  Surgical History:  She  has a past surgical history that includes cholecystectomy; hysterectomy; Cardiac catheterization; colonoscopy; colonoscopy (N/A, 7/25/2017); and colonoscopy (N/A, 9/23/2020).   Family History:  Her family history includes Cancer (age of onset: 53) in her sister; DVT in her son; Diabetes in her sister; Diabetes (age of onset: 67) in her mother; Hypertension in her brother.  Social History:  She  reports that she quit smoking about 44 years ago. Her smoking use included cigarettes. She started smoking about 74 years ago. She has never used smokeless tobacco. She reports current alcohol use of about 1.0 standard drink of alcohol per week. She reports that she does not use drugs.    Tobacco:  She smoked tobacco in the past but quit greater than 12 months ago.  Social History     Tobacco Use   Smoking Status Former    Current packs/day: 0.00    Types: Cigarettes    Start date: 1/1/1950    Quit date:  1980    Years since quittin.5   Smokeless Tobacco Never          CAGE Alcohol Screen:   CAGE screening score of 0 on 7/3/2024, showing low risk of alcohol abuse.      Patient Care Team:  Weiler, Colleen M, DO as PCP - General (Family Medicine)  Weiler, Colleen M, DO as PCP - Family Practitioner (Family Medicine)    Review of Systems  ROS:   Allergic/Immuno:  Negative for environmental allergies and food allergies  Cardiovascular:  Negative for chest pain and irregular heartbeat/palpitations  Constitutional:  Negative for decreased activity, fever, irritability and lethargy  Endocrine:  Negative for abnormal sleep patterns, increased activity, polydipsia and polyphagia  ENMT:  Negative for ear drainage, hearing loss and nasal drainage  Eyes:  Negative for eye discharge and vision loss  Gastrointestinal:  Negative for abdominal pain, constipation, decreased appetite, diarrhea and vomiting  Genitourinary:  Negative for dysuria and hematuria  Hema/Lymph:  Negative for easy bleeding and easy bruising  Integumentary:  Negative for pruritus and rash  Musculoskeletal:  Negative for bone/joint symptoms  Neurological:  Negative for gait disturbance  Psychiatric:  Negative for inappropriate interaction and psychiatric symptoms      Objective:   Physical Exam       /66   Pulse 66   Temp 97.6 °F (36.4 °C) (Temporal)   Resp 16   Ht 5' 3.5\" (1.613 m)   Wt 206 lb (93.4 kg)   BMI 35.92 kg/m²  Estimated body mass index is 35.92 kg/m² as calculated from the following:    Height as of this encounter: 5' 3.5\" (1.613 m).    Weight as of this encounter: 206 lb (93.4 kg).    Medicare Hearing Assessment:   Hearing Screening    Screening Method: Questionnaire  I have a problem hearing over the telephone: Sometimes I have trouble following the conversations when two or more people are talking at the same time: Yes   I have trouble understanding things on the TV: Yes I have to strain to understand conversations: Yes   I  have to worry about missing the telephone ring or doorbell: No I have trouble hearing conversations in a noisy background such as a crowded room or restaurant: No   I get confused about where sounds come from: Yes I misunderstand some words in a sentence and need to ask people to repeat themselves: Sometimes   I especially have trouble understanding the speech of women and children: No I have trouble understanding the speaker in a large room such as at a meeting or place of Taoist: No   Many people I talk to seem to mumble (or don't speak clearly): No People get annoyed because I misunderstand what they say: No   I misunderstand what others are saying and make inappropriate responses: No I avoid social activities because I cannot hear well and fear I will reply improperly: No   Family members and friends have told me they think I may have hearing loss: Sometimes             Visual Acuity:   Right Eye Visual Acuity: Corrected Right Eye Chart Acuity: 20/25   Left Eye Visual Acuity: Corrected Left Eye Chart Acuity: 20/25   Both Eyes Visual Acuity: Corrected Both Eyes Chart Acuity: 20/25   Able To Tolerate Visual Acuity: Yes    Gen:  Well-nourished.  No distress.  HEENT: Conjunctive clear.  Ismael ear canals clear.  Ismael TMs intact with good landmarks noted.  Nares patent.  Oral mucous membrane moist.  Normal lips, teeth, and gums.  Oropharynx normal.  Neck supple.  Good ROM.  No LAD.  Thyroid normal.  CV:  Regular rate and rhythm; no murmurs  Lungs:  Clear to ausculation; good aeration               No wheezes, rales or rhonchi  Abd: soft, non-tender, non-distended          Normal bowel sounds; no masses          No hepatosplenomegaly  Breasts:  Normal appearance bilateral.  No masses or lesions noted.  Normal nipples bilateral.  No nipple discharge noted.  Normal lymph nodes.  Extremities: No cyanosis, clubbing, edema.  Pedal pulses 2+ ismael.  Bilateral barefoot skin diabetic exam is normal, visualized feet and the  appearance is normal.  Bilateral monofilament/sensation of both feet is normal.  Pulsation pedal pulse exam of both lower legs/feet is normal as well.          Assessment & Plan:   Silvana Morales is a 73 year old female who presents for a Medicare Assessment.     1. Encounter for annual health examination (Primary)    Encouraged exercise and healthy diet.  Feeling well. Labs done.  -     CBC, Platelet; No Differential; Future; Expected date: 07/10/2024  -     Comp Metabolic Panel (14); Future; Expected date: 07/10/2024  -     Lipid Panel; Future; Expected date: 07/10/2024    2. Type 2 diabetes mellitus without retinopathy (HCC)    Controlled.  Refer to [facility] for eval.  -     Ophthalmology Referral - In Network  -     Hemoglobin A1C; Future; Expected date: 07/10/2024  -     Microalb/Creat Ratio, Random Urine; Future; Expected date: 07/10/2024  -     Podiatry Referral    3. Screening mammogram for breast cancer     Mammogram ordered.  Will call with results.    -     [facility] BLAINE 2D+3D SCREENING BILAT (CPT=77067/12694); Future; Expected date: 07/10/2024    4. Estrogen deficiency    Check DEXA scan.     5. Severe obesity (BMI 35.0-39.9) with comorbidity (HCC)    Encouraged healthy diet an exercise.     6. Asthma with COPD (chronic obstructive pulmonary disease) (HCC)    Well controlled with Advair.     7. Diverticulosis    Controlled.  CPM    8. Age-related nuclear cataract of both eyes    Refer to Ophtho    9. Obstructive sleep apnea    Controlled.  CPM    The patient indicates understanding of these issues and agrees to the plan.  Reinforced healthy diet, lifestyle, and exercise.      Return in 6 months (on 1/10/2025).     Colleen Weiler, DO, 7/20/2024     Supplementary Documentation:   General Health:  In the past six months, have you lost more than 10 pounds without trying?: 2 - No  Has your appetite been poor?: No  Type of Diet: Balanced;Low Salt  How does the patient maintain a good energy level?: Appropriate  Exercise;Daily Walks  How would you describe your daily physical activity?: Moderate  How would you describe your current health state?: Fair  How do you maintain positive mental well-being?: Games;Visiting Family  On a scale of 0 to 10, with 0 being no pain and 10 being severe pain, what is your pain level?: 0 - (None)  In the past six months, have you experienced urine leakage?: 1-Yes  At any time do you feel concerned for the safety/well-being of yourself and/or your children, in your home or elsewhere?: No  Have you had any immunizations at another office such as Influenza, Hepatitis B, Tetanus, or Pneumococcal?: No    Health Maintenance   Topic Date Due    Zoster Vaccines (1 of 2) Never done    Diabetes Care Foot Exam  04/06/2017    MA Annual Health Assessment  01/01/2024    COVID-19 Vaccine (7 - 2023-24 season) 02/28/2024    Diabetes Care Dilated Eye Exam  06/21/2024    Mammogram  09/06/2024    Influenza Vaccine (1) 10/01/2024    Diabetes Care A1C  01/10/2025    Diabetes Care: GFR  07/10/2025    Diabetes Care: Microalb/Creat Ratio  07/10/2025    Colorectal Cancer Screening  09/23/2025    DEXA Scan  Completed    Annual Depression Screening  Completed    Fall Risk Screening (Annual)  Completed    Pneumococcal Vaccine: 65+ Years  Completed

## 2024-07-16 ENCOUNTER — TELEPHONE (OUTPATIENT)
Dept: CASE MANAGEMENT | Age: 74
End: 2024-07-16

## 2024-07-16 DIAGNOSIS — E28.39 ESTROGEN DEFICIENCY: Primary | ICD-10-CM

## 2024-07-16 NOTE — TELEPHONE ENCOUNTER
Dr. Weiler,     Patient is requesting an order for a bone density test.  The current order expires 7/17/24.     Thank you

## 2024-07-25 RX ORDER — LANCETS
EACH MISCELLANEOUS
Qty: 100 EACH | Refills: 1 | Status: CANCELLED | OUTPATIENT
Start: 2024-07-25

## 2024-07-30 RX ORDER — LANCETS
1 EACH MISCELLANEOUS 2 TIMES DAILY
Qty: 200 EACH | Refills: 3 | Status: SHIPPED | OUTPATIENT
Start: 2024-07-30

## 2024-08-01 RX ORDER — FLUTICASONE PROPIONATE AND SALMETEROL 250; 50 UG/1; UG/1
POWDER RESPIRATORY (INHALATION)
Qty: 60 EACH | Refills: 0 | Status: SHIPPED | OUTPATIENT
Start: 2024-08-01

## 2024-08-01 NOTE — TELEPHONE ENCOUNTER
Please review; protocol failed/ has no protocol    Requested Prescriptions   Pending Prescriptions Disp Refills    FLUTICASONE-SALMETEROL 250-50 MCG/ACT Inhalation Aerosol Powder, Breath Activated [Pharmacy Med Name: Fluticasone Propionate/Salmeterol Diskus 250-50mcg/ Aer Pras]  0     Sig: INHALE ONE PUFF BY MOUTH EVERY 12 HOURS       Asthma & COPD Medication Protocol Failed - 7/29/2024  3:01 AM        Failed - Asthma Action Score greater than or equal to 20        Failed - AAP/ACT given in last 12 months     No data recorded  No data recorded  No data recorded  No data recorded          Passed - Appointment in past 6 or next 3 months      Recent Outpatient Visits              3 weeks ago Encounter for annual health examination    [facility] Weiler, Colleen M, DO    Office Visit    1 month ago Tinnitus of both ears    [facility] Bárbara Ortiz MD    Office Visit    2 months ago Acute recurrent frontal sinusitis    [facility] Weiler, Colleen M, DO    Office Visit    3 months ago Heart palpitations    [facility] Weiler, Colleen M, DO    Office Visit    4 months ago Type 2 diabetes mellitus without retinopathy (HCC)    [facility] Ar Lund DPM    Office Visit          Future Appointments         Provider Department Appt Notes    In 1 month ADO DEXA RM1; ADO ADAMA RM1 [facility] Mammography - [place]     In 1 month ADO ADAMA RM1; ADO DEXA RM1 [facility] DEXA - [place]                        Recent Outpatient Visits              3 weeks ago Encounter for annual health examination    [facility] Weiler, Colleen M,     Office Visit    1 month ago Tinnitus of both ears    [facility] Bárbara Ortiz MD    Office  Visit    2 months ago Acute recurrent frontal sinusitis    [place] Weiler, Colleen M, DO    Office Visit    3 months ago Heart palpitations    [place] [facility] Weiler, Colleen M, DO    Office Visit    4 months ago Type 2 diabetes mellitus without retinopathy (HCC)    [facility] Ar Lund, BRENNENM    Office Visit          Future Appointments         Provider Department Appt Notes    In 1 month ADO DEXA RM1; ADO ADAMA RM1 [facility] Mammography - [place]     In 1 month ADO ADAMA RM1; ADO DEXA RM1 [facility] DEXA - [place]

## 2024-08-14 RX ORDER — PRAVASTATIN SODIUM 10 MG
10 TABLET ORAL NIGHTLY
Qty: 90 TABLET | Refills: 3 | Status: SHIPPED | OUTPATIENT
Start: 2024-08-14

## 2024-08-14 NOTE — TELEPHONE ENCOUNTER
Refill passed per [facility] protocol.  Requested Prescriptions   Pending Prescriptions Disp Refills    PRAVASTATIN 10 MG Oral Tab [Pharmacy Med Name: Pravastatin Sodium 10 Mg Tab Nort] 90 tablet 0     Sig: Take 1 tablet (10 mg total) by mouth nightly.       Cholesterol Medication Protocol Passed - 8/10/2024  3:01 AM        Passed - ALT < 80     Lab Results   Component Value Date    ALT 22 07/10/2024             Passed - ALT resulted within past year        Passed - Lipid panel within past 12 months     Lab Results   Component Value Date    CHOLEST 143 07/10/2024    TRIG 84 07/10/2024    HDL 47 07/10/2024    LDL 80 07/10/2024    VLDL 13 07/10/2024    TCHDLRATIO 3.3 08/31/2023    NONHDLC 96 07/10/2024             Passed - In person appointment or virtual visit in the past 12 mos or appointment in next 3 mos     Recent Outpatient Visits              1 month ago Encounter for annual health examination    [facility] Weiler, Colleen M, DO    Office Visit    2 months ago Tinnitus of both ears    [facility] Bárbara Ortiz MD    Office Visit    3 months ago Acute recurrent frontal sinusitis    [facility] Weiler, Colleen M, DO    Office Visit    3 months ago Heart palpitations    [facility] Weiler, Colleen M, DO    Office Visit    4 months ago Type 2 diabetes mellitus without retinopathy (HCC)    [facility] Ar Lund DPM    Office Visit          Future Appointments         Provider Department Appt Notes    In 1 month ADO DEXA RM1; ADO ADAMA RM1 [facility] - [place]     In 1 month ADO ADAMA RM1; ADO DEXA RM1 [facility] DEXA - [place]                        Recent Outpatient Visits              1 month ago Encounter for annual health examination    [facility]  [facility] Weiler, Colleen M, DO    Office Visit    2 months ago Tinnitus of both ears    [facility], [facility] Bárbara Ortiz MD    Office Visit    3 months ago Acute recurrent frontal sinusitis    [facility] Weiler, Colleen M, DO    Office Visit    3 months ago Heart palpitations    [facility] Weiler, Colleen M, DO    Office Visit    4 months ago Type 2 diabetes mellitus without retinopathy (HCC)    [facility] Ar Lund DPM    Office Visit          Future Appointments         Provider Department Appt Notes    In 1 month ADO DEXA RM1; ADO [facility] RM1 [facility] - [place]     In 1 month ADO [facility] RM1; ADO DEXA RM1 [facility] DEXA - [place]

## 2024-08-14 NOTE — TELEPHONE ENCOUNTER
Refill passed per [facility] protocol.  Requested Prescriptions   Pending Prescriptions Disp Refills    pravastatin 10 MG Oral Tab [Pharmacy Med Name: Pravastatin Sodium 10 Mg Tab Nort] 90 tablet 0     Sig: Take 1 tablet (10 mg total) by mouth nightly.       Cholesterol Medication Protocol Passed - 8/10/2024  3:01 AM        Passed - ALT < 80     Lab Results   Component Value Date    ALT 22 07/10/2024             Passed - ALT resulted within past year        Passed - Lipid panel within past 12 months     Lab Results   Component Value Date    CHOLEST 143 07/10/2024    TRIG 84 07/10/2024    HDL 47 07/10/2024    LDL 80 07/10/2024    VLDL 13 07/10/2024    TCHDLRATIO 3.3 08/31/2023    NONHDLC 96 07/10/2024             Passed - In person appointment or virtual visit in the past 12 mos or appointment in next 3 mos     Recent Outpatient Visits              1 month ago Encounter for annual health examination    [facility] Weiler, Colleen M, DO    Office Visit    2 months ago Tinnitus of both ears    [facility] Bárbara Ortiz MD    Office Visit    3 months ago Acute recurrent frontal sinusitis    [facility] Weiler, Colleen M, DO    Office Visit    3 months ago Heart palpitations    [facility] Weiler, Colleen M, DO    Office Visit    4 months ago Type 2 diabetes mellitus without retinopathy (HCC)    [facility] Ar Lund DPM    Office Visit          Future Appointments         Provider Department Appt Notes    In 1 month ADO DEXA RM1; ADO ADAMA RM1 [facility]amm[facility] - [place]     In 1 month ADO ADAMA RM1; ADO DEXA RM1 [facility] DEXA - [place]                        Recent Outpatient Visits              1 month ago Encounter for annual health examination    [facility]  [facility] Weiler, Colleen M, DO    Office Visit    2 months ago Tinnitus of both ears    [facility] Bárbara Ortiz MD    Office Visit    3 months ago Acute recurrent frontal sinusitis    [facility] Weiler, Colleen M, DO    Office Visit    3 months ago Heart palpitations    [facility] Weiler, Colleen M, DO    Office Visit    4 months ago Type 2 diabetes mellitus without retinopathy (HCC)    [facility] Ar Lund DPM    Office Visit          Future Appointments         Provider Department Appt Notes    In 1 month ADO DEXA RM1; ADO [facility] RM1 [facility] - [place]     In 1 month ADO [facility] RM1; ADO DEXA RM1 [facility] DEXA - [place]

## 2024-08-29 RX ORDER — LOSARTAN POTASSIUM 25 MG/1
25 TABLET ORAL DAILY
Qty: 90 TABLET | Refills: 0 | Status: SHIPPED | OUTPATIENT
Start: 2024-08-29

## 2024-08-29 NOTE — TELEPHONE ENCOUNTER
Please review. Protocol Pass      The original prescription was discontinued on 4/23/2024 by Weiler, Colleen M, DO for the following reason: Adverse Reaction. Renewing this prescription may not be appropriate.     Please advise if refill is appropriate.    Requested Prescriptions   Pending Prescriptions Disp Refills    LOSARTAN 25 MG Oral Tab [Pharmacy Med Name: Losartan Potassium 25 Mg Tab [facility]] 90 tablet 0     Sig: Take 1 tablet (25 mg total) by mouth daily.       Hypertension Medications Protocol Passed - 8/28/2024  3:01 AM        Passed - CMP or BMP in past 12 months        Passed - Last BP reading less than 140/90     BP Readings from Last 1 Encounters:   07/10/24 100/66               Passed - In person appointment or virtual visit in the past 12 mos or appointment in next 3 mos     Recent Outpatient Visits              1 month ago Encounter for annual health examination    [facility] Weiler, Colleen M,     Office Visit    2 months ago Tinnitus of both ears    [facility] Bárbara Ortiz MD    Office Visit    3 months ago Acute recurrent frontal sinusitis    [facility] Weiler, Colleen M,     Office Visit    4 months ago Heart palpitations    [facility] Weiler, Colleen M,     Office Visit    5 months ago Type 2 diabetes mellitus without retinopathy (HCC)    [facility] Ar Lund DPM    Office Visit          Future Appointments         Provider Department Appt Notes    In 2 weeks ADO DEXA RM1; ADO ADAMA RM1 [facility] - [place]     In 2 weeks ADO ADAMA RM1; ADO DEXA RM1 [facility] DEXA - [place]                     Passed - EGFRCR or GFRAA > 50     GFR Evaluation  EGFRCR: 70 , resulted on 7/10/2024                 Future Appointments         Provider Department Appt Notes     In 2 weeks ADO DEXA RM1; ADO ADAMA RM1 [facility] - [place]     In 2 weeks ADO ADAMA RM1; O DEXA RM1 [facility] DEXA - [place]           Recent Outpatient Visits              1 month ago Encounter for annual health examination    [facility] Weiler, Colleen M, DO    Office Visit    2 months ago Tinnitus of both ears    [facility] Bárbara Ortiz MD    Office Visit    3 months ago Acute recurrent frontal sinusitis    [facility] Weiler, Colleen M, DO    Office Visit    4 months ago Heart palpitations    [facility] Weiler, Colleen M, DO    Office Visit    5 months ago Type 2 diabetes mellitus without retinopathy (HCC)    [facility] Ar Lund DPM    Office Visit

## 2024-09-03 ENCOUNTER — NURSE TRIAGE (OUTPATIENT)
Dept: FAMILY MEDICINE CLINIC | Facility: CLINIC | Age: 74
End: 2024-09-03

## 2024-09-03 RX ORDER — NYSTATIN 100000 U/G
1 CREAM TOPICAL 2 TIMES DAILY
Qty: 30 G | Refills: 0 | Status: SHIPPED | OUTPATIENT
Start: 2024-09-03 | End: 2024-09-10

## 2024-09-03 NOTE — TELEPHONE ENCOUNTER
Please reply to pool: EM RN TRIAGE  Action Requested: Summary for Provider     []  Critical Lab, Recommendations Needed  [] Need Additional Advice  []   FYI    []   Need Orders  [x] Need Medications Sent to Pharmacy  []  Other     SUMMARY: Patient contacts clinic reporting redness and irritation beneath her breasts.  Denies broken skin or drainage.  Denies fever, body aches or chills.  Worse in warm weather.  Denies itching or pain.  Has used nystatin in the past with success, requesting prescription.  Prescription pended for signature.  Please advise.     Reason for call: Rash  Onset: Data Unavailable                       Reason for Disposition   Red, moist, irritated area between skin folds (or under larger breasts)    Protocols used: Rash or Redness - [id number]

## 2024-09-09 RX ORDER — FLUTICASONE PROPIONATE AND SALMETEROL 250; 50 UG/1; UG/1
POWDER RESPIRATORY (INHALATION)
Qty: 60 EACH | Refills: 1 | Status: SHIPPED | OUTPATIENT
Start: 2024-09-09

## 2024-09-09 NOTE — TELEPHONE ENCOUNTER
Please review. Protocol Failed; No Protocol    Requested Prescriptions   Pending Prescriptions Disp Refills    FLUTICASONE-SALMETEROL 250-50 MCG/ACT Inhalation Aerosol Powder, Breath Activated [Pharmacy Med Name: Fluticasone Propionate/Salmeterol Diskus 250-50mcg/ Aer Pras]  0     Sig: INHALE ONE PUFF BY MOUTH EVERY 12 HOURS       Asthma & COPD Medication Protocol Failed - 9/6/2024  9:32 AM        Failed - Asthma Action Score greater than or equal to 20        Failed - AAP/ACT given in last 12 months     No data recorded  No data recorded  No data recorded  No data recorded          Passed - Appointment in past 6 or next 3 months      Recent Outpatient Visits              2 months ago Encounter for annual health examination    [facility] Weiler, Colleen M,     Office Visit    2 months ago Tinnitus of both ears    [facility] Bárbara Ortiz MD    Office Visit    4 months ago Acute recurrent frontal sinusitis    [facility] Weiler, Colleen M,     Office Visit    4 months ago Heart palpitations    [facility] Weiler, Colleen M, DO    Office Visit    5 months ago Type 2 diabetes mellitus without retinopathy (HCC)    [facility] rA Lund DPM    Office Visit          Future Appointments         Provider Department Appt Notes    In 1 week ADO DEXA RM1; ADO [facility] RM1 [facility] - [place]     In 1 week ADO [facility] RM1; ADO DEXA RM1 [facility] DEXA - [place]                            Future Appointments         Provider Department Appt Notes    In 1 week ADO DEXA RM1; ADO [facility] RM1 [facility] Mammography - [place]     In 1 week ADO [facility] RM1; ADO DEXA RM1 [facility] DEXA - [place]           Recent Outpatient Visits              2 months ago Encounter for annual health  examination    [facility] Weiler, Colleen M, DO    Office Visit    2 months ago Tinnitus of both ears    [facility] Bárbara Ortiz MD    Office Visit    4 months ago Acute recurrent frontal sinusitis    [facility] Weiler, Colleen M, DO    Office Visit    4 months ago Heart palpitations    [facility] Weiler, Colleen M, DO    Office Visit    5 months ago Type 2 diabetes mellitus without retinopathy (HCC)    [facility] Ar Lund DPM    Office Visit

## 2024-09-16 ENCOUNTER — PATIENT OUTREACH (OUTPATIENT)
Dept: CASE MANAGEMENT | Age: 74
End: 2024-09-16

## 2024-09-16 NOTE — PROGRESS NOTES
Called patient for Chronic Care Management - Enrollment     Left message to call back   Call #1    [facility]  [facility] Department  [phone] work

## 2024-10-10 ENCOUNTER — NURSE TRIAGE (OUTPATIENT)
Dept: FAMILY MEDICINE CLINIC | Facility: CLINIC | Age: 74
End: 2024-10-10

## 2024-10-10 NOTE — TELEPHONE ENCOUNTER
Action Requested: Summary for Provider     []  Critical Lab, Recommendations Needed  [] Need Additional Advice  [x]   FYI    []   Need Orders  [] Need Medications Sent to Pharmacy  []  Other     SUMMARY: Spoke with patient, Date of Birth verified, she complaint of left thumb infection, swollen, pus, hurts with rate 7/10.  She took ibuprofen last noc with relief, also 2 Tylenol today.  He denies chest pain, fever, shortness of breath, no other symptom.   He was advised if symptom persist or gets worse to go to ER/ IC, she agreed and stated understanding.   She is looking for appt with PCP.  She was offered an appt with other Provider as PCP has no opening today but she declined.  She stated she will go to  for eval, patient has transportation issue.   She was advised to check coverage with her insurance, she stated she's been in  2x already.         Reason for call: thumb pain/ infection  Onset: 1 weeks                       Reason for Disposition   Looks infected (e.g., spreading redness, red streak, pus)    Protocols used: Finger Pain-A-OH

## 2024-10-14 ENCOUNTER — HOSPITAL ENCOUNTER (OUTPATIENT)
Age: 74
Discharge: HOME | End: 2024-10-14
Payer: MEDICARE

## 2024-10-14 VITALS
SYSTOLIC BLOOD PRESSURE: 150 MMHG | DIASTOLIC BLOOD PRESSURE: 71 MMHG | TEMPERATURE: 97 F | OXYGEN SATURATION: 98 % | RESPIRATION RATE: 18 BRPM | HEART RATE: 68 BPM

## 2024-10-14 DIAGNOSIS — L02.512 ABSCESS OF LEFT THUMB: Primary | ICD-10-CM

## 2024-10-14 PROCEDURE — 99213 OFFICE O/P EST LOW 20 MIN: CPT | Performed by: NURSE PRACTITIONER

## 2024-10-14 RX ORDER — SULFAMETHOXAZOLE AND TRIMETHOPRIM 800; 160 MG/1; MG/1
1 TABLET ORAL 2 TIMES DAILY
Qty: 20 TABLET | Refills: 0 | Status: SHIPPED | OUTPATIENT
Start: 2024-10-14 | End: 2024-10-24

## 2024-10-14 NOTE — ED INITIAL ASSESSMENT (HPI)
Pt states has had what she was told a wart was on her right hand. Pt states has grown in size and now is infected. Pt denies  fevers.

## 2024-10-15 NOTE — ED PROVIDER NOTES
Patient Seen in: [facility]      History     Chief Complaint   Patient presents with    Growth     Stated Complaint: thumb problem    Subjective:   HPI  Patient is a 74-year-old female who presents to the immediate care center with a concern for redness, swelling, pain to the base of her left thumb.  She has had a pedunculated mass at the base of her thumb for likely greater than 20 years (although she is confident she did not have this as an adolescent, young lady.  It has grown in size over the last several years, but has not caused her concern until the last 3 or 4 days when it has become red, swollen, and painful.  She denies direct trauma; denies motor or sensory deficit.          Objective:     Past Medical History:    Asthma (HCC)    Chest pain    Diabetes (HCC)    Diverticulosis    Esophageal reflux    Extrinsic asthma, unspecified    History of blood transfusion    Polyp of colon    None in . Repeat CLN in  if patient interested in screening/remains in good health    Pulmonary embolism (HCC)    Sleep apnea              Past Surgical History:   Procedure Laterality Date    Cardiac catheterization      Cholecystectomy      Colonoscopy      Colonoscopy N/A 2017    Procedure: COLONOSCOPY;  Surgeon: RUPERT Infante MD;  Location: [facility] ENDOSCOPY    Colonoscopy N/A 2020    Procedure: COLONOSCOPY;  Surgeon: RUPERT Infante MD;  Location: [facility] ENDOSCOPY    Hysterectomy                  Social History     Socioeconomic History    Marital status:    Tobacco Use    Smoking status: Former     Current packs/day: 0.00     Types: Cigarettes     Start date: 1950     Quit date: 1980     Years since quittin.8    Smokeless tobacco: Never   Vaping Use    Vaping status: Never Used   Substance and Sexual Activity    Alcohol use: Yes     Alcohol/week: 1.0 standard drink of alcohol     Types: 1 Shots of liquor per week     Comment: \"socially\"    Drug use: No     Social Drivers of  Health      Received from [facility], [facility]    Social Connections    Received from [facility], [facility]    Housing Stability              Review of Systems   Constitutional:  Negative for chills and fever.   Musculoskeletal:  Negative for arthralgias and myalgias.   Neurological:  Negative for weakness and numbness.       Positive for stated complaint: thumb problem  Other systems are as noted in HPI.  Constitutional and vital signs reviewed.      All other systems reviewed and negative except as noted above.    Physical Exam     ED Triage Vitals [10/14/24 1245]   /71   Pulse 68   Resp 18   Temp 97 °F (36.1 °C)   Temp src Temporal   SpO2 98 %   O2 Device None (Room air)       Current Vitals:   Vital Signs  BP: 150/71  Pulse: 68  Resp: 18  Temp: 97 °F (36.1 °C)  Temp src: Temporal    Oxygen Therapy  SpO2: 98 %  O2 Device: None (Room air)        Physical Exam  Vitals and nursing note reviewed.   Constitutional:       General: She is not in acute distress.  Pulmonary:      Effort: Pulmonary effort is normal. No respiratory distress.   Musculoskeletal:        Hands:    Skin:     General: Skin is warm and dry.   Neurological:      Mental Status: She is alert and oriented to person, place, and time.   Psychiatric:         Behavior: Behavior normal.             ED Course   Labs Reviewed - No data to display                MDM      Patient was advised that there is clear evidence of infection that will be treated today with oral antibiotics.  However, as there does appear to be notable purulence, it is believed that this will improve quicker with incision and drainage.    With patient's consent, the area was cleaned with Betadine solution.  An 18-gauge needle was introduced and a moderate amount of purulence was relieved.  Patient tolerated procedure well.    Patient was informed that this should have close follow-up to ensure  resolution of infection and for definitive diagnosis of underlying structure.  However, she should also discuss potential for excision of this mass by hand surgeon.  She states understanding and will schedule follow-up appointment.          Medical Decision Making  Differential diagnoses considered today include, but are not exclusive of: Cutaneous abscess, cutaneous horn, polydactyly, verruca vulgaris, cellulitis.    Problems Addressed:  Abscess of left thumb: self-limited or minor problem    Risk  OTC drugs.  Prescription drug management.        Disposition and Plan     Clinical Impression:  1. Abscess of left thumb         Disposition:  Discharge  10/14/2024  1:58 pm    Follow-up:  Dexter Garcia MD  [address] [facility] [address]  [phone]    Schedule an appointment as soon as possible for a visit in 2 weeks            Medications Prescribed:  Discharge Medication List as of 10/14/2024  1:59 PM        START taking these medications    Details   sulfamethoxazole-trimethoprim -160 MG Oral Tab per tablet Take 1 tablet by mouth 2 (two) times daily for 10 days., Normal, Disp-20 tablet, R-0                 Supplementary Documentation:

## 2024-10-24 ENCOUNTER — HOSPITAL ENCOUNTER (OUTPATIENT)
Dept: MAMMOGRAPHY | Age: 74
Discharge: HOME | End: 2024-10-24
Attending: FAMILY MEDICINE
Payer: MEDICARE

## 2024-10-24 DIAGNOSIS — Z12.31 SCREENING MAMMOGRAM FOR BREAST CANCER: ICD-10-CM

## 2024-10-24 PROCEDURE — 77063 BREAST TOMOSYNTHESIS BI: CPT | Performed by: FAMILY MEDICINE

## 2024-10-24 PROCEDURE — 77067 SCR MAMMO BI INCL CAD: CPT | Performed by: FAMILY MEDICINE

## 2024-10-30 ENCOUNTER — OFFICE VISIT (OUTPATIENT)
Dept: FAMILY MEDICINE CLINIC | Facility: CLINIC | Age: 74
End: 2024-10-30
Payer: MEDICARE

## 2024-10-30 VITALS
BODY MASS INDEX: 36 KG/M2 | WEIGHT: 206 LBS | HEART RATE: 84 BPM | TEMPERATURE: 98 F | DIASTOLIC BLOOD PRESSURE: 76 MMHG | SYSTOLIC BLOOD PRESSURE: 143 MMHG | RESPIRATION RATE: 16 BRPM

## 2024-10-30 DIAGNOSIS — L02.512 ABSCESS OF LEFT THUMB: Primary | ICD-10-CM

## 2024-10-30 DIAGNOSIS — E11.9 TYPE 2 DIABETES MELLITUS WITHOUT RETINOPATHY (HCC): ICD-10-CM

## 2024-10-30 PROCEDURE — 1160F RVW MEDS BY RX/DR IN RCRD: CPT | Performed by: FAMILY MEDICINE

## 2024-10-30 PROCEDURE — 3077F SYST BP >= 140 MM HG: CPT | Performed by: FAMILY MEDICINE

## 2024-10-30 PROCEDURE — 90662 IIV NO PRSV INCREASED AG IM: CPT | Performed by: FAMILY MEDICINE

## 2024-10-30 PROCEDURE — 99214 OFFICE O/P EST MOD 30 MIN: CPT | Performed by: FAMILY MEDICINE

## 2024-10-30 PROCEDURE — 3078F DIAST BP <80 MM HG: CPT | Performed by: FAMILY MEDICINE

## 2024-10-30 PROCEDURE — 1159F MED LIST DOCD IN RCRD: CPT | Performed by: FAMILY MEDICINE

## 2024-10-30 PROCEDURE — 1126F AMNT PAIN NOTED NONE PRSNT: CPT | Performed by: FAMILY MEDICINE

## 2024-10-30 PROCEDURE — G0008 ADMIN INFLUENZA VIRUS VAC: HCPCS | Performed by: FAMILY MEDICINE

## 2024-10-30 NOTE — PROGRESS NOTES
HPI: Silvana is a 74 year old female who presents for  follow-up. Has had bump on her right thumb for a long time.  Pt states it has gotten larger.  Got caught in her hair and things. Started to hurt and become red.  Went to  on 10/14. Had purulent drainage.  Had I&D done. She went home and put a needle in it herself as well.  Started on antibiotics.  Finished and improved now. Needs to see Dr. Rubi for removal.     Interested in Ozempic for diabetes.  Son has lost weight and she feels like it may work for her. Will get eyes checked on Nov 14.     PMH:    Past Medical History:    Asthma (HCC)    Chest pain    Diabetes (HCC)    Diverticulosis    Esophageal reflux    Extrinsic asthma, unspecified    History of blood transfusion    Polyp of colon    None in 2020. Repeat CLN in 2025 if patient interested in screening/remains in good health    Pulmonary embolism (HCC)    Sleep apnea      Alg:  Penicillins   Meds: Medications Ordered Prior to Encounter[1]   Tobacco Use: no    ROS: see HPI    Objective:   Gen: AOx3. NAD.   /76   Pulse 84   Temp 97.9 °F (36.6 °C) (Temporal)   Resp 16   Wt 206 lb (93.4 kg)   BMI 35.92 kg/m²   Skin: elongated, pedunculated mass noted to left lateral thumb. Mild drainage noted    Assessment:/Plan:    Encounter Diagnoses   Name Primary?    Abscess of left thumb    Infection seems to be resolved.  Redressed.  Refer to plastic surgery for removal.   Yes    Type 2 diabetes mellitus without retinopathy (HCC)    Pt is interested in Ozempic for diabetes.  Refer to Luz Baez for evaluation.       Colleen Weiler, DO           [1]   Current Outpatient Medications on File Prior to Visit   Medication Sig Dispense Refill    Accu-Chek Softclix Lancets Does not apply Misc 1 Lancet by Finger stick route 2 (two) times daily. 200 each 3    fluticasone-salmeterol 250-50 MCG/ACT Inhalation Aerosol Powder, Breath Activated INHALE ONE PUFF BY MOUTH EVERY 12 HOURS 60 each 1    losartan 25  MG Oral Tab Take 1 tablet (25 mg total) by mouth daily. 90 tablet 0    pravastatin 10 MG Oral Tab Take 1 tablet (10 mg total) by mouth nightly. 90 tablet 3    metoprolol succinate ER 25 MG Oral Tablet 24 Hr Take 1 tablet (25 mg total) by mouth daily.      montelukast 10 MG Oral Tab Take 1 tablet (10 mg total) by mouth nightly. At Bedtime 90 tablet 3    Glucose Blood (ACCU-CHEK LORRAINE PLUS) In Vitro Strip Check blood sugar twice daily 200 strip 3    Accu-Chek Softclix Lancets Does not apply Misc Check blood glucose once daily. 100 each 1    omeprazole 20 MG Oral Capsule Delayed Release Take 1 capsule (20 mg total) by mouth every morning. 90 capsule 3    albuterol 108 (90 Base) MCG/ACT Inhalation Aero Soln Inhale 2 puffs into the lungs every 4 (four) hours as needed for Wheezing. 1 each 3    fluticasone propionate (FLONASE) 50 MCG/ACT Nasal Suspension 2 sprays by Nasal route daily. 3 each 3    metFORMIN 500 MG Oral Tab Take 1 tablet (500 mg total) by mouth 2 (two) times daily with meals. 180 tablet 1    Spacer/Aero-Holding Chambers (COMPACT SPACE CHAMBER) Does not apply Device TO BE USED WITH ALBUTEROL, INHALE 2 PUFFS EVERY 4 HOURS AS NEEDED      lidocaine (LIDO DEION) 4 % External Patch Place 1 patch onto the skin daily. 15 patch 2    meclizine 25 MG Oral Tab Take 1 tablet (25 mg total) by mouth 2 (two) times daily as needed. 60 tablet 0    Glucose Blood In Vitro Strip place 1 by Misc.(Non-Drug; Combo Route) route 3 times every day with glucose monitor 100 strip 3    Blood Glucose Monitoring Suppl (BLOOD GLUCOSE MONITOR SYSTEM) w/Device Does not apply Kit One Touch machine. Please check accucheck once daily 1 kit 0    aspirin 81 MG Oral Tab Take 1 tablet (81 mg total) by mouth daily.      Cyanocobalamin (VITAMIN B-12 CR OR) Take  by mouth.      Multiple Vitamins-Minerals (CENTRUM SILVER) Oral Tab Take  by mouth. Take one tablet by mouth daily       No current facility-administered medications on file prior to visit.

## 2024-11-04 ENCOUNTER — OFFICE VISIT (OUTPATIENT)
Dept: SURGERY | Facility: CLINIC | Age: 74
End: 2024-11-04
Payer: MEDICARE

## 2024-11-04 ENCOUNTER — HOSPITAL ENCOUNTER (OUTPATIENT)
Dept: GENERAL RADIOLOGY | Facility: HOSPITAL | Age: 74
Discharge: HOME | End: 2024-11-04
Attending: PLASTIC SURGERY
Payer: MEDICARE

## 2024-11-04 DIAGNOSIS — D23.62: Primary | ICD-10-CM

## 2024-11-04 DIAGNOSIS — D23.62: ICD-10-CM

## 2024-11-04 DIAGNOSIS — Q69.1: ICD-10-CM

## 2024-11-04 PROCEDURE — 99204 OFFICE O/P NEW MOD 45 MIN: CPT | Performed by: PLASTIC SURGERY

## 2024-11-04 PROCEDURE — 1126F AMNT PAIN NOTED NONE PRSNT: CPT | Performed by: PLASTIC SURGERY

## 2024-11-04 PROCEDURE — 1159F MED LIST DOCD IN RCRD: CPT | Performed by: PLASTIC SURGERY

## 2024-11-04 PROCEDURE — 73140 X-RAY EXAM OF FINGER(S): CPT | Performed by: PLASTIC SURGERY

## 2024-11-04 RX ORDER — HYDROCODONE BITARTRATE AND ACETAMINOPHEN 7.5; 325 MG/1; MG/1
1 TABLET ORAL
Qty: 10 TABLET | Refills: 0 | Status: SHIPPED | OUTPATIENT
Start: 2024-11-04

## 2024-11-04 NOTE — H&P (VIEW-ONLY)
Silvana Morales is a 74 year old female that presents with   Chief Complaint   Patient presents with    Lesion     LTH preaxial polydactyly    .    REFERRED BY:  Colleen M Weiler    Pacemaker: No  Latex Allergy: no  Coumadin: No  Plavix: No  Other anticoagulants: No  Diet medication: No  Cardiac stents: No    HAND DOMINANCE:  Right    Profession: Retired    RECONSTRUCTIVE HISTORY    SUN EXPOSURE   Current no   Past no   Sunburns no   Tanning salons current no   Tanning salons past no     SKIN CANCER    Personal history of skin cancer: none      HPI:       74-year-old female right-hand-dominant with a left thumb base mass    Over 20 years duration    She always had \"a bump\" but feels it grew approximately 20 years ago    Pain with bleeding infection    Increase in size    Immediate care, incision and drainage and placed on oral antibiotics          Review of Systems:   Constitutional: No change in appetite, chill/rigors, or fatigue  GI: No jaundice  Endocrine: No generalized weakness  Neurological: No aphasia, loss of consciousness, or seizures       Integumentary:     LESION 1:    Location Left preaxial polydactyly    Onset:   Congentital     Pain:   yes    Itching:   no    Bleeding:  yes    Infection:  yes    Size change?  increased:    Color change?  no    Biopsy?:   no  Seen by [facility] I&D and placed on oral antibiotics that has been completed          PMH:     MEDICAL  Past Medical History:    Asthma (HCC)    Chest pain    Diabetes (HCC)    Diverticulosis    Esophageal reflux    Extrinsic asthma, unspecified    History of blood transfusion    Polyp of colon    None in 2020. Repeat CLN in 2025 if patient interested in screening/remains in good health    Pulmonary embolism (HCC)    Sleep apnea        SURGICAL  Past Surgical History:   Procedure Laterality Date    Cardiac catheterization      Cholecystectomy      Colonoscopy      Colonoscopy N/A 7/25/2017    Procedure: COLONOSCOPY;  Surgeon: RUPERT Infante MD;   Location: [facility] ENDOSCOPY    Colonoscopy N/A 9/23/2020    Procedure: COLONOSCOPY;  Surgeon: RUPERT Infante MD;  Location: [facility] ENDOSCOPY    Hysterectomy          ALLERIGIES  Allergies[1]     MEDICATIONS  Current Outpatient Medications   Medication Sig Dispense Refill    fluticasone-salmeterol 250-50 MCG/ACT Inhalation Aerosol Powder, Breath Activated INHALE ONE PUFF BY MOUTH EVERY 12 HOURS 60 each 1    losartan 25 MG Oral Tab Take 1 tablet (25 mg total) by mouth daily. 90 tablet 0    pravastatin 10 MG Oral Tab Take 1 tablet (10 mg total) by mouth nightly. 90 tablet 3    Accu-Chek Softclix Lancets Does not apply Misc 1 Lancet by Finger stick route 2 (two) times daily. 200 each 3    metoprolol succinate ER 25 MG Oral Tablet 24 Hr Take 1 tablet (25 mg total) by mouth daily.      montelukast 10 MG Oral Tab Take 1 tablet (10 mg total) by mouth nightly. At Bedtime 90 tablet 3    Glucose Blood (ACCU-CHEK LORRAINE PLUS) In Vitro Strip Check blood sugar twice daily 200 strip 3    Accu-Chek Softclix Lancets Does not apply Misc Check blood glucose once daily. 100 each 1    omeprazole 20 MG Oral Capsule Delayed Release Take 1 capsule (20 mg total) by mouth every morning. 90 capsule 3    albuterol 108 (90 Base) MCG/ACT Inhalation Aero Soln Inhale 2 puffs into the lungs every 4 (four) hours as needed for Wheezing. 1 each 3    fluticasone propionate (FLONASE) 50 MCG/ACT Nasal Suspension 2 sprays by Nasal route daily. 3 each 3    metFORMIN 500 MG Oral Tab Take 1 tablet (500 mg total) by mouth 2 (two) times daily with meals. 180 tablet 1    Spacer/Aero-Holding Chambers (COMPACT SPACE CHAMBER) Does not apply Device TO BE USED WITH ALBUTEROL, INHALE 2 PUFFS EVERY 4 HOURS AS NEEDED      lidocaine (LIDO DEION) 4 % External Patch Place 1 patch onto the skin daily. 15 patch 2    meclizine 25 MG Oral Tab Take 1 tablet (25 mg total) by mouth 2 (two) times daily as needed. 60 tablet 0    Glucose Blood In Vitro Strip place 1 by Misc.(Non-Drug;  Combo Route) route 3 times every day with glucose monitor 100 strip 3    Blood Glucose Monitoring Suppl (BLOOD GLUCOSE MONITOR SYSTEM) w/Device Does not apply Kit One Touch machine. Please check accucheck once daily 1 kit 0    aspirin 81 MG Oral Tab Take 1 tablet (81 mg total) by mouth daily.      Cyanocobalamin (VITAMIN B-12 CR OR) Take  by mouth.      Multiple Vitamins-Minerals (CENTRUM SILVER) Oral Tab Take  by mouth. Take one tablet by mouth daily          SOCIAL HISTORY  Social History     Socioeconomic History    Marital status:    Tobacco Use    Smoking status: Former     Current packs/day: 0.00     Types: Cigarettes     Start date: 1950     Quit date: 1980     Years since quittin.8    Smokeless tobacco: Never   Vaping Use    Vaping status: Never Used   Substance and Sexual Activity    Alcohol use: Yes     Alcohol/week: 1.0 standard drink of alcohol     Types: 1 Shots of liquor per week     Comment: \"socially\"    Drug use: No   Other Topics Concern    Right Handed Yes     Social Drivers of Health      [facility]    Social Connections    [facility]    Housing Stability        FAMILY HISTORY  Family History   Problem Relation Age of Onset    Diabetes Mother 67        CAUSE OF DEATH    Diabetes Sister     Cancer Sister 53        RENAL  and pancreatic ca    Hypertension Brother     Other (DVT) Son     Glaucoma Neg     Macular degeneration Neg           PHYSICAL EXAM:     CONSTITUTIONAL: Overall appearance - Normal  HEENT: Normocephalic  EYES: Conjunctiva - Right: Normal, Left: Normal; EOMI  EARS: Inspection - Right: Normal, Left: Normal  NECK/THYROID: Inspection - Normal, Palpation - Normal, Thyroid gland - Normal, No adenopathy  RESPIRATORY: Inspection - Normal, Effort - Normal  CARDIOVASCULAR: Regular rhythm, No murmurs  ABDOMEN: Inspection - Normal, No  abdominal tenderness  NEURO: Memory intact  PSYCH: Oriented to person, place, time, and situation, Appropriate mood and affect      Integument Physical Exam:       2 x 1 cm preaxial polydactyly    1 cm sessile base    X-ray independently interpreted: Soft tissue mass emanating from the radial and just proximal to MP area  No bone involvement      ASSESSMENT/PLAN:     IMPRESSION:    Left thumb preaxial polydactyly, persistent, with history of infection        We had a long discussion.  I explained to the patient the nature of this lesion / these lesions, as well as treatment options.    We will plan on excision.  She will need a flap for reconstruction and a thumb spica splint    RISKS:     Bleeding  Infection  Loss of flap  Scar  Pain  Stiffness  Weakness  Loss of function  Anesthesia risks          11/4/2024  Dexter Tobin MD    [facility] [place], operated by [facility] Data Reviewed.               +++++++++++++++++++++++++++++++++++++++++++++++++    MEDICAL DECISION MAKING    PROBLEMS      MODERATE    (number / complexity)          Undiagnosed new problem with uncertain prognosis    DATA         STRAIGHTFORWARD    (amount / complexity)           MANAGEMENT RISK  HIGH    (complications/ morbidity)       Major surgery with risk factors                  MDM LEVEL    MODERATE            [1]   Allergies  Allergen Reactions    Penicillins WHEEZING

## 2024-11-04 NOTE — PROGRESS NOTES
Silvana Morales is a 74 year old female that presents with   Chief Complaint   Patient presents with    Lesion     LTH preaxial polydactyly    .    REFERRED BY:  Colleen M Weiler    Pacemaker: No  Latex Allergy: no  Coumadin: No  Plavix: No  Other anticoagulants: No  Diet medication: No  Cardiac stents: No    HAND DOMINANCE:  Right    Profession: Retired    RECONSTRUCTIVE HISTORY    SUN EXPOSURE   Current no   Past no   Sunburns no   Tanning salons current no   Tanning salons past no     SKIN CANCER    Personal history of skin cancer: none      HPI:       74-year-old female right-hand-dominant with a left thumb base mass    Over 20 years duration    She always had \"a bump\" but feels it grew approximately 20 years ago    Pain with bleeding infection    Increase in size    Immediate care, incision and drainage and placed on oral antibiotics          Review of Systems:   Constitutional: No change in appetite, chill/rigors, or fatigue  GI: No jaundice  Endocrine: No generalized weakness  Neurological: No aphasia, loss of consciousness, or seizures       Integumentary:     LESION 1:    Location Left preaxial polydactyly    Onset:   Congentital     Pain:   yes    Itching:   no    Bleeding:  yes    Infection:  yes    Size change?  increased:    Color change?  no    Biopsy?:   no  Seen by [facility] I&D and placed on oral antibiotics that has been completed          PMH:     MEDICAL  Past Medical History:    Asthma (HCC)    Chest pain    Diabetes (HCC)    Diverticulosis    Esophageal reflux    Extrinsic asthma, unspecified    History of blood transfusion    Polyp of colon    None in 2020. Repeat CLN in 2025 if patient interested in screening/remains in good health    Pulmonary embolism (HCC)    Sleep apnea        SURGICAL  Past Surgical History:   Procedure Laterality Date    Cardiac catheterization      Cholecystectomy      Colonoscopy      Colonoscopy N/A 7/25/2017    Procedure: COLONOSCOPY;  Surgeon: RUPERT Infante MD;   Location: [facility] ENDOSCOPY    Colonoscopy N/A 9/23/2020    Procedure: COLONOSCOPY;  Surgeon: RUPERT Infante MD;  Location: [facility] ENDOSCOPY    Hysterectomy          ALLERIGIES  Allergies[1]     MEDICATIONS  Current Outpatient Medications   Medication Sig Dispense Refill    fluticasone-salmeterol 250-50 MCG/ACT Inhalation Aerosol Powder, Breath Activated INHALE ONE PUFF BY MOUTH EVERY 12 HOURS 60 each 1    losartan 25 MG Oral Tab Take 1 tablet (25 mg total) by mouth daily. 90 tablet 0    pravastatin 10 MG Oral Tab Take 1 tablet (10 mg total) by mouth nightly. 90 tablet 3    Accu-Chek Softclix Lancets Does not apply Misc 1 Lancet by Finger stick route 2 (two) times daily. 200 each 3    metoprolol succinate ER 25 MG Oral Tablet 24 Hr Take 1 tablet (25 mg total) by mouth daily.      montelukast 10 MG Oral Tab Take 1 tablet (10 mg total) by mouth nightly. At Bedtime 90 tablet 3    Glucose Blood (ACCU-CHEK LORRAINE PLUS) In Vitro Strip Check blood sugar twice daily 200 strip 3    Accu-Chek Softclix Lancets Does not apply Misc Check blood glucose once daily. 100 each 1    omeprazole 20 MG Oral Capsule Delayed Release Take 1 capsule (20 mg total) by mouth every morning. 90 capsule 3    albuterol 108 (90 Base) MCG/ACT Inhalation Aero Soln Inhale 2 puffs into the lungs every 4 (four) hours as needed for Wheezing. 1 each 3    fluticasone propionate (FLONASE) 50 MCG/ACT Nasal Suspension 2 sprays by Nasal route daily. 3 each 3    metFORMIN 500 MG Oral Tab Take 1 tablet (500 mg total) by mouth 2 (two) times daily with meals. 180 tablet 1    Spacer/Aero-Holding Chambers (COMPACT SPACE CHAMBER) Does not apply Device TO BE USED WITH ALBUTEROL, INHALE 2 PUFFS EVERY 4 HOURS AS NEEDED      lidocaine (LIDO DEION) 4 % External Patch Place 1 patch onto the skin daily. 15 patch 2    meclizine 25 MG Oral Tab Take 1 tablet (25 mg total) by mouth 2 (two) times daily as needed. 60 tablet 0    Glucose Blood In Vitro Strip place 1 by Misc.(Non-Drug;  Combo Route) route 3 times every day with glucose monitor 100 strip 3    Blood Glucose Monitoring Suppl (BLOOD GLUCOSE MONITOR SYSTEM) w/Device Does not apply Kit One Touch machine. Please check accucheck once daily 1 kit 0    aspirin 81 MG Oral Tab Take 1 tablet (81 mg total) by mouth daily.      Cyanocobalamin (VITAMIN B-12 CR OR) Take  by mouth.      Multiple Vitamins-Minerals (CENTRUM SILVER) Oral Tab Take  by mouth. Take one tablet by mouth daily          SOCIAL HISTORY  Social History     Socioeconomic History    Marital status:    Tobacco Use    Smoking status: Former     Current packs/day: 0.00     Types: Cigarettes     Start date: 1950     Quit date: 1980     Years since quittin.8    Smokeless tobacco: Never   Vaping Use    Vaping status: Never Used   Substance and Sexual Activity    Alcohol use: Yes     Alcohol/week: 1.0 standard drink of alcohol     Types: 1 Shots of liquor per week     Comment: \"socially\"    Drug use: No   Other Topics Concern    Right Handed Yes     Social Drivers of Health      Received [facility], [facility]    Social Connections    Received from [facility], [facility]    Housing Stability        FAMILY HISTORY  Family History   Problem Relation Age of Onset    Diabetes Mother 67        CAUSE OF DEATH    Diabetes Sister     Cancer Sister 53        RENAL  and pancreatic ca    Hypertension Brother     Other (DVT) Son     Glaucoma Neg     Macular degeneration Neg           PHYSICAL EXAM:     CONSTITUTIONAL: Overall appearance - Normal  HEENT: Normocephalic  EYES: Conjunctiva - Right: Normal, Left: Normal; EOMI  EARS: Inspection - Right: Normal, Left: Normal  NECK/THYROID: Inspection - Normal, Palpation - Normal, Thyroid gland - Normal, No adenopathy  RESPIRATORY: Inspection - Normal, Effort - Normal  CARDIOVASCULAR: Regular rhythm, No murmurs  ABDOMEN: Inspection - Normal, No  abdominal tenderness  NEURO: Memory intact  PSYCH: Oriented to person, place, time, and situation, Appropriate mood and affect      Integument Physical Exam:       2 x 1 cm preaxial polydactyly    1 cm sessile base    X-ray independently interpreted: Soft tissue mass emanating from the radial and just proximal to MP area  No bone involvement      ASSESSMENT/PLAN:     IMPRESSION:    Left thumb preaxial polydactyly, persistent, with history of infection        We had a long discussion.  I explained to the patient the nature of this lesion / these lesions, as well as treatment options.    We will plan on excision.  She will need a flap for reconstruction and a thumb spica splint    RISKS:     Bleeding  Infection  Loss of flap  Scar  Pain  Stiffness  Weakness  Loss of function  Anesthesia risks          11/4/2024  Dexter Tobin MD    [facility] Data Reviewed.               +++++++++++++++++++++++++++++++++++++++++++++++++    MEDICAL DECISION MAKING    PROBLEMS      MODERATE    (number / complexity)          Undiagnosed new problem with uncertain prognosis    DATA         STRAIGHTFORWARD    (amount / complexity)           MANAGEMENT RISK  HIGH    (complications/ morbidity)       Major surgery with risk factors                  MDM LEVEL    MODERATE            [1]   Allergies  Allergen Reactions    Penicillins WHEEZING

## 2024-11-04 NOTE — PROGRESS NOTES
Patient request for surgery signed by patient and witnessed and signed by RN.  Prescription for norco 7.5 mg electronically sent to pharmacy per Dr. Tobin's order and patient instructed to  prescription before surgery.   Pre-Surgical Instruction Handout, Hand Elevation Handout, Dressing Protector Handout, and Post-Operative Instruction Handout given to and reviewed w/patient.  All questions and concerns answered; pt verbalized an understanding of all pre-operative teaching.  Patient instructed to call the office with any further questions and/or concerns.  Patient escorted to surgery scheduling to schedule surgery and post-operative appointments.

## 2024-11-06 ENCOUNTER — PATIENT MESSAGE (OUTPATIENT)
Dept: FAMILY MEDICINE CLINIC | Facility: CLINIC | Age: 74
End: 2024-11-06

## 2024-11-07 RX ORDER — METOPROLOL SUCCINATE 25 MG/1
25 TABLET, EXTENDED RELEASE ORAL DAILY
Refills: 0 | OUTPATIENT
Start: 2024-11-07

## 2024-11-07 RX ORDER — ALBUTEROL SULFATE 90 UG/1
2 INHALANT RESPIRATORY (INHALATION) EVERY 4 HOURS PRN
Qty: 25.5 G | Refills: 3 | Status: SHIPPED | OUTPATIENT
Start: 2024-11-07

## 2024-11-07 NOTE — TELEPHONE ENCOUNTER
Refill passed per [facility] protocol.  Requested Prescriptions   Pending Prescriptions Disp Refills    metFORMIN 500 MG Oral Tab 180 tablet 1     Sig: Take 1 tablet (500 mg total) by mouth 2 (two) times daily with meals.       Diabetes Medication Protocol Passed - 11/7/2024  2:38 PM        Passed - Last A1C < 7.5 and within past 6 months     Lab Results   Component Value Date    A1C 7.0 (H) 07/10/2024             Passed - In person appointment or virtual visit in the past 6 mos or appointment in next 3 mos     Recent Outpatient Visits              3 days ago Benign neoplasm of skin of upper limb, including shoulder, left    [facility] Dexter Garcia MD    Office Visit    1 week ago Abscess of left thumb    [facility] Weiler, Colleen M, DO    Office Visit    4 months ago Encounter for annual health examination    [facility] Weiler, Colleen M, DO    Office Visit    4 months ago Tinnitus of both ears    [facility] Bárbara Ortiz MD    Office Visit    6 months ago Acute recurrent frontal sinusitis    [facility] Weiler, Colleen M, DO    Office Visit          Future Appointments         Provider Department Appt Notes    In 3 weeks Dexter Garcia MD [facility], [place] EXCISION MASS LTH FLAP RECONSTRUCTION; NORCO 7.5MG; 12/17 OT RN, 12/19 OT, 12/23 OT    In 1 month TIGIST FREY RN [facility] 2[facility]    In 1 month Sai Harrison [facility] 1ST    In 1 month Sai Harrison [facility]    In 1 month [facility]ai [facility]    In 2 months Luz Baez APRN [place]  [facility] [place]     In 2 months [facility] DEXA RM1 [facility]                     Passed - Microalbumin procedure in past 12 months or taking ACE/ARB        Passed - EGFRCR or GFRAA > 50     GFR Evaluation  EGFRCR: 70 , resulted on 7/10/2024          Passed - GFR in the past 12 months          albuterol 108 (90 Base) MCG/ACT Inhalation Aero Soln 1 each 3     Sig: Inhale 2 puffs into the lungs every 4 (four) hours as needed for Wheezing.       Asthma & COPD Medication Protocol Passed - 11/7/2024  2:38 PM        Passed - Appointment in past 6 or next 3 months      Recent Outpatient Visits              3 days ago Benign neoplasm of skin of upper limb, including shoulder, left    [facility] Dexter Garcia MD    Office Visit    1 week ago Abscess of left thumb    [facility] Weiler, Colleen M, DO    Office Visit    4 months ago Encounter for annual health examination    [facility] Weiler, Colleen M,     Office Visit    4 months ago Tinnitus of both ears    [facility] Bárbara Ortiz MD    Office Visit    6 months ago Acute recurrent frontal sinusitis    [facility] Weiler, Colleen M, DO    Office Visit          Future Appointments         Provider Department Appt Notes    In 3 weeks Dexter Garcia MD [facility], [place] EXCISION MASS LT FLAP RECONSTRUCTION; NORCO 7.5MG; 12/17 OT RN, 12/19 OT, 12/23 OT    In 1 month TIGIST FREY RN [facility] [place] OT 2ND    In 1 month Sai Harrison [facility]IZABETH [facility]    In 1 month Sai Harrison [facility]    In 1 month Sai Harrison OTRL  [facility] OT    In 2 months Luz Baez APRN [facility]     In 2 months [facility] DEXA RM1 [facility]                       metoprolol succinate ER 25 MG Oral Tablet 24 Hr  0     Sig: Take 1 tablet (25 mg total) by mouth daily.       Hypertension Medications Protocol Failed - 11/7/2024  2:38 PM        Failed - Last BP reading less than 140/90     BP Readings from Last 1 Encounters:   10/30/24 143/76               Passed - CMP or BMP in past 12 months        Passed - In person appointment or virtual visit in the past 12 mos or appointment in next 3 mos     Recent Outpatient Visits              3 days ago [facility]ign [facility] skin of upper limb, including shoulder, left    [facility]    Office Visit    1 week ago Abscess of left thumb    [facility] Weiler, Colleen M, DO    Office Visit    4 months ago Encounter for annual health examination    [facility] Weiler, Colleen M, DO    Office Visit    4 months ago Tinnitus of both ears    [facility] Bárbara Ortiz MD    Office Visit    6 months ago Acute recurrent frontal sinusitis    [facility] Weiler, Colleen M, DO    Office Visit          Future Appointments         Provider Department Appt Notes    In 3 weeks [facility] FLAP RECONSTRUCTION; NORCO 7.5MG; 12/17 OT RN, 12/19 OT, 12/23 OT    In 1 month [facility]    In 1 month [facility]    In 1 month Sai Harrison OTRL [facility]  [place], [place] OT    In 1 month Sai Harrison OTRL [facility], [place] OT    In 2 months Luz Baez APRN [facility]     In 2 months [facility] DEXA [address] [facility]                     Passed - EGFRCR or GFRAA > 50     GFR Evaluation  EGFRCR: 70 , resulted on 7/10/2024             Future Appointments         Provider Department Appt Notes    In 3 weeks Dexter Garcia MD [facility], [place] EXCISION MASS LT FLAP RECONSTRUCTION; NORCO 7.5MG; 12/17 OT RN, 12/19 OT, 12/23 OT    In 1 month EC TK MAHAN [facility], [place] OT 2ND    In 1 month Sai Harrison OTRL [facility], [place] RN 1ST    In 1 month Sai Harrison OTRL [facility], [place] OT    In 1 month Sai Harrison OTRL [facility], [place] OT    In 2 months Luz Baez APRN [facility]     In 2 months [facility] DEXA [address] [facility]           Recent Outpatient Visits              3 days ago Benign neoplasm of skin of upper limb, including shoulder, left    [facility] Dexter Garcia MD    Office Visit    1 week ago Abscess of left thumb    [facility] Weiler, Colleen M, DO    Office Visit    4 months ago Encounter for annual health examination    [facility] Weiler, Colleen M, DO    Office Visit    4 months ago Tinnitus of both ears    [facility] Bárbara Ortiz MD    Office Visit    6 months ago Acute recurrent frontal sinusitis    [facility] Weiler, Colleen M, DO    Office Visit

## 2024-11-07 NOTE — TELEPHONE ENCOUNTER
Metoprolol ER 25mg: written by Dr. Arley Dumont. Sent MyChart to patient to contact [facility]

## 2024-11-07 NOTE — TELEPHONE ENCOUNTER
Please review; protocol failed/No Protocol    ACT scores did not attach to protocol.     Requested Prescriptions   Pending Prescriptions Disp Refills    albuterol 108 (90 Base) MCG/ACT Inhalation Aero Soln 1 each 3     Sig: Inhale 2 puffs into the lungs every 4 (four) hours as needed for Wheezing.       Asthma & COPD Medication Protocol Passed - 11/7/2024  2:44 PM        Passed - Appointment in past 6 or next 3 months      Recent Outpatient Visits              3 days ago Benign neoplasm of skin of upper limb, including shoulder, left    [facility]    Office Visit    1 week ago Abscess of left thumb    [facility] Weiler, Colleen M, DO    Office Visit    4 months ago Encounter for annual health examination    [facility] Weiler, Colleen M, DO    Office Visit    4 months ago Tinnitus of both ears    [facility] Bárbara Ortiz MD    Office Visit    6 months ago Acute recurrent frontal sinusitis    [facility] Weiler, Colleen M, DO    Office Visit          Future Appointments         Provider Department Appt Notes    In 3 weeks Dexter Garcia MD [facility] [place] EXCISION MASS LTH FLAP RECONSTRUCTION; NORCO 7.5MG; 12/17 OT RN, 12/19 OT, 12/23 OT    In 1 month EC TK MAHAN [facility] [place] OT 2ND    In 1 month Sai Harrison OTRL [facility] [place] RN 1ST    In 1 month Sai Harrison OTRL [facility] [place] OT    In 1 month Sai Harrison OTRL [facility] [place] OT    In 2 months Luz Baez APRN [facility]     In 2 months [address]  [facility]                      Signed Prescriptions Disp Refills    metFORMIN 500 MG Oral Tab 180 tablet 3     Sig: Take 1 tablet (500 mg total) by mouth 2 (two) times daily with meals.       Diabetes Medication Protocol Passed - 11/7/2024  2:44 PM        Passed - Last A1C < 7.5 and within past 6 months     Lab Results   Component Value Date    A1C 7.0 (H) 07/10/2024             Passed - In person appointment or virtual visit in the past 6 mos or appointment in next 3 mos     Recent Outpatient Visits              3 days ago Benign neoplasm of skin of upper limb, including shoulder, left    [facility]    [facility] Visit    1 week ago Abscess of left thumb    [facility] Weiler, Colleen M, DO    Office Visit    4 months ago Encounter for annual health examination    [facility] We[place], Colleen M, DO    Office Visit    4 months ago Tinnitus of both ears    [facility] Bárbara Ortiz MD    Office Visit    6 months ago Acute recurrent frontal sinusitis    [facility] We[place], Colleen M, DO    Office Visit          Future Appointments         Provider Department Appt Notes    In 3 weeks Dexter Garcia MD [facility], [place] EXCISION MASS LTH FLAP RECONSTRUCTION; NORCO 7.5MG; 12/17 OT RN, 12/19 OT, 12/23 OT    In 1 month TIGIST FREY RN [facility], [place] OT 2ND    In 1 month [facility]    In 1 month Sai Harrison OTRL [facility], [place] OT    In 1 month Sai Harrison OTRL [facility], [place] OT    In 2 months Luz Baez APRN [facility],  [facility]     In 2 months [facility] DEXA RM1 [facility]                     Passed - Microalbumin procedure in past 12 months or taking ACE/ARB        Passed - EGFRCR or GFRAA > 50     GFR Evaluation  EGFRCR: 70 , resulted on 7/10/2024          Passed - GFR in the past 12 months         Refused Prescriptions Disp Refills    metoprolol succinate ER 25 MG Oral Tablet 24 Hr  0     Sig: Take 1 tablet (25 mg total) by mouth daily.       Hypertension Medications Protocol Failed - 11/7/2024  2:44 PM        Failed - Last BP reading less than 140/90     BP Readings from Last 1 Encounters:   10/30/24 143/76               Passed - CMP or BMP in past 12 months        Passed - In person appointment or virtual visit in the past 12 mos or appointment in next 3 mos     Recent Outpatient Visits              3 days ago Benign neoplasm of skin of upper limb, including shoulder, left    [facility] Dexter Garcia MD    Office Visit    1 week ago Abscess of left thumb    [facility] Weiler, Colleen M, DO    Office Visit    4 months ago Encounter for annual health examination    [facility] We[facility], Colleen M, DO    Office Visit    4 months ago Tinnitus of both ears    [facility] Bárbara Ortiz MD    Office Visit    6 months ago Acute recurrent frontal sinusitis    [facility] Weiler, Colleen M, DO    Office Visit          Future Appointments         Provider Department Appt Notes    In 3 weeks [facility] EXCISION MASS LTH FLAP RECONSTRUCTION; NORCO 7.5MG; 12/17 OT RN, 12/19 OT, 12/23 OT    In 1 month EC TK MAHAN [facility] OT 2ND    In 1 month Sai Harrison, PAULRL [place]  [facility], [place] RN 1ST    In 1 month Sai Harrison OTRL [facility] [place] OT    In 1 month Sai Harrison OTRL [facility] [place] OT    In 2 months Luz Baez APRN [facility]     In 2 months [facility] DEXA RM1 [facility]                     Passed - EGFRCR or GFRAA > 50     GFR Evaluation  EGFRCR: 70 , resulted on 7/10/2024             Future Appointments         Provider Department Appt Notes    In 3 weeks Dexter Garcia MD [facility] [place] EXCISION MASS LTH FLAP RECONSTRUCTION; NORCO 7.5MG; 12/17 OT RN, 12/19 OT, 12/23 OT    In 1 month EC TK MAHAN [facility] [place] OT 2ND    In 1 month Sai Harrison OTRL [facility], [place] RN 1ST    In 1 month Sai Harrison OTRL [facility] [place] OT    In 1 month Sai Harrison OTRL [facility], [place] OT    In 2 months Luz Baez APRN [facility]     In 2 months [facility] DEXA [address] [facility]           Recent Outpatient Visits              3 days ago Benign neoplasm of skin of upper limb, including shoulder, left    [facility]    Office Visit    1 week ago Abscess of left thumb    [facility] Weiler, Colleen M,     Office Visit    4 months ago Encounter for annual health examination    [facility] Weiler, Colleen M,     Office Visit    4 months ago Tinnitus of both ears    [facility] Bárbara Ortiz MD    Office Visit    6 months ago Acute recurrent  frontal sinusitis    [facility] Weiler, Colleen M, DO    Office Visit

## 2024-11-12 RX ORDER — FLUTICASONE PROPIONATE AND SALMETEROL 250; 50 UG/1; UG/1
POWDER RESPIRATORY (INHALATION)
Qty: 60 EACH | Refills: 3 | Status: SHIPPED | OUTPATIENT
Start: 2024-11-12

## 2024-11-12 NOTE — TELEPHONE ENCOUNTER
Refill passed per [facility] protocol.  Requested Prescriptions   Pending Prescriptions Disp Refills    FLUTICASONE-SALMETEROL 250-50 MCG/ACT Inhalation Aerosol Powder, Breath Activated [Pharmacy Med Name: Fluticasone Propionate/Salmeterol Diskus 250-50mcg/ Aer Pras]  0     Sig: INHALE ONE PUFF BY MOUTH EVERY 12 HOURS       Asthma & COPD Medication Protocol Passed - 11/12/2024  7:37 AM        Passed - Appointment in past 6 or next 3 months      Recent Outpatient Visits              1 week ago Benign neoplasm of skin of upper limb, including shoulder, left    [facility]    Office Visit    1 week ago Abscess of left thumb    [facility] Weiler, Colleen M, DO    Office Visit    4 months ago Encounter for annual health examination    [facility] Weiler, Colleen M, DO    Office Visit    5 months ago Tinnitus of both ears    [facility] Bárbara Ortiz MD    Office Visit    6 months ago Acute recurrent frontal sinusitis    [facility] We[facility], Colleen M, DO    Office Visit          Future Appointments         Provider Department Appt Notes    In 3 weeks [facility] MD [facility] EXCISION MASS LTH FLAP RECONSTRUCTION; NORCO 7.5MG; 12/17 OT RN, 12/19 OT, 12/23 OT    In 1 month EC TK MAHAN [facility] 2ND    In 1 month [facility]    In 1 month Sai Harrison O[facility]L [facility]    In 1 month Sai Harrison OTRL [facility] [place] OT    In 1 month Luz Baez APRN [facility]     In 2 months [facility] DEXA  [address]                        Recent Outpatient Visits              1 week ago Benign neoplasm of skin of upper limb, including shoulder, left    [facility] MD    Office Visit    1 week ago Abscess of left thumb    [facility] We[facility], Colleen M, DO    Office Visit    4 months ago Encounter for annual health examination    [facility] We[facility], Colleen M, DO    Office Visit    5 months ago Tinnitus of both ears    [facility] Bárbara Ortiz MD    Office Visit    6 months ago Acute recurrent frontal sinusitis    [facility] Weiler, Colleen M, DO    Office Visit          Future Appointments         Provider Department Appt Notes    In 3 weeks Dexter Garcia MD [facility] [place] EXCISION MASS LTH FLAP RECONSTRUCTION; NORCO 7.5MG; 12/17 OT RN, 12/19 OT, 12/23 OT    In 1 month TIGIST FREY RN [facility] [place] OT 2ND    In 1 month Sai Harrison OTRL [facility] [place] RN 1ST    In 1 month Sai Harrison OTELIZABETH [facility] [place] OT    In 1 month Sai Harrison OTELIZABETH [facility] [place] OT    In 1 month Luz Baez APRN [facility]     In 2 months [facility] DEXA [address]

## 2024-11-27 RX ORDER — METOPROLOL SUCCINATE 25 MG/1
25 TABLET, EXTENDED RELEASE ORAL DAILY
COMMUNITY

## 2024-11-27 NOTE — DISCHARGE INSTRUCTIONS
HOME INSTRUCTIONS  Do not remive dressing for any reason  Keep dressing ing clean and dry  Some drainage through the dressing is expected  Some swelling is normal  Take medication as directed  Keep elevated at all times (above heart level)  Do not try to move fingers or hand within the dressing  Keep in sling at all times  You have an appointment at the office on 12/17/2024      [facility] HOME CARE INSTRUCTIONS: POST-OP ANESTHESIA  The medication that you received for sedation or general anesthesia can last up to 24 hours. Your judgment and reflexes may be altered, even if you feel like your normal self.      We Recommend:   Do not drive any motor vehicle or bicycle   Avoid mowing the lawn, playing sports, or working with power tools/applicances (power saws, electric knives or mixers)   That you have someone stay with you on your first night home   Do not drink alcohol or take sleeping pills or tranquilizers   Do not sign legal documents within 24 hours of your procedure   If you had a nerve block for your surgery, take extra care not to put any pressure on your arm or hand for 24 hours    It is normal:  For you to have a sore throat if you had a breathing tube during surgery (while you were asleep!). The sore throat should get better within 48 hours. You can gargle with warm salt water (1/2 tsp in 4 oz warm water) or use a throat lozenge for comfort  To feel muscle aches or soreness especially in the abdomen, chest or neck. The achy feeling should go away in the next 24 hours  To feel weak, sleepy or \"wiped out\". Your should start feeling better in the next 24 hours.   To experience mild discomforts such as sore lip or tongue, headache, cramps, gas pains or a bloated feeling in your abdomen.   To experience mild back pain or soreness for a day or two if you had spinal or epidural anesthesia.   If you had laparoscopic surgery, to feel shoulder pain or discomfort on the day of surgery.   For some patients to have  nausea after surgery/anesthesia    If you feel nausea or experience vomiting:   Try to move around less.   Eat less than usual or drink only liquids until the next morning   Nausea should resolve in about 24 hours    If you have a problem when you are at home:    Call your surgeons office   Discharge Instructions: After Your Surgery  You’ve just had surgery. During surgery, you were given medicine called anesthesia to keep you relaxed and free of pain. After surgery, you may have some pain or nausea. This is common. Here are some tips for feeling better and getting well after surgery.   Going home  Your healthcare provider will show you how to take care of yourself when you go home. They'll also answer your questions. Have an adult family member or friend drive you home. For the first 24 hours after your surgery:   Don't drive or use heavy equipment.  Don't make important decisions or sign legal papers.  Take medicines as directed.  Don't drink alcohol.  Have someone stay with you, if needed. They can watch for problems and help keep you safe.  Be sure to go to all follow-up visits with your healthcare provider. And rest after your surgery for as long as your provider tells you to.   Coping with pain  If you have pain after surgery, pain medicine will help you feel better. Take it as directed, before pain becomes severe. Also, ask your healthcare provider or pharmacist about other ways to control pain. This might be with heat, ice, or relaxation. And follow any other instructions your surgeon or nurse gives you.      Stay on schedule with your medicine.     Tips for taking pain medicine  To get the best relief possible, remember these points:   Pain medicines can upset your stomach. Taking them with a little food may help.  Most pain relievers taken by mouth need at least 20 to 30 minutes to start to work.  Don't wait till your pain becomes severe before you take your medicine. Try to time your medicine so that you  can take it before starting an activity. This might be before you get dressed, go for a walk, or sit down for dinner.  Constipation is a common side effect of some pain medicines. Call your healthcare provider before taking any medicines such as laxatives or stool softeners to help ease constipation. Also ask if you should skip any foods. Drinking lots of fluids and eating foods such as fruits and vegetables that are high in fiber can also help. Remember, don't take laxatives unless your surgeon has prescribed them.  Drinking alcohol and taking pain medicine can cause dizziness and slow your breathing. It can even be deadly. Don't drink alcohol while taking pain medicine.  Pain medicine can make you react more slowly to things. Don't drive or run machinery while taking pain medicine.  Your healthcare provider may tell you to take acetaminophen to help ease your pain. Ask them how much you're supposed to take each day. Acetaminophen or other pain relievers may interact with your prescription medicines or other over-the-counter (OTC) medicines. Some prescription medicines have acetaminophen and other ingredients in them. Using both prescription and OTC acetaminophen for pain can cause you to accidentally overdose. Read the labels on your OTC medicines with care. This will help you to clearly know the list of ingredients, how much to take, and any warnings. It may also help you not take too much acetaminophen. If you have questions or don't understand the information, ask your pharmacist or healthcare provider to explain it to you before you take the OTC medicine.   Managing nausea  Some people have an upset stomach (nausea) after surgery. This is often because of anesthesia, pain, or pain medicine, less movement of food in the stomach, or the stress of surgery. These tips will help you handle nausea and eat healthy foods as you get better. If you were on a special food plan before surgery, ask your healthcare provider  if you should follow it while you get better. Check with your provider on how your eating should progress. It may depend on the surgery you had. These general tips may help:   Don't push yourself to eat. Your body will tell you when to eat and how much.  Start off with clear liquids and soup. They're easier to digest.  Next try semi-solid foods as you feel ready. These include mashed potatoes, applesauce, and gelatin.  Slowly move to solid foods. Don’t eat fatty, rich, or spicy foods at first.  Don't force yourself to have 3 large meals a day. Instead eat smaller amounts more often.  Take pain medicines with a small amount of solid food, such as crackers or toast. This helps prevent nausea.  When to call your healthcare provider  Call your healthcare provider right away if you have any of these:   You still have too much pain, or the pain gets worse, after taking the medicine. The medicine may not be strong enough. Or there may be a complication from the surgery.  You feel too sleepy, dizzy, or groggy. The medicine may be too strong.  Side effects such as nausea or vomiting. Your healthcare provider may advise taking other medicines to .  Skin changes such as rash, itching, or hives. This may mean you have an allergic reaction. Your provider may advise taking other medicines.  The incision looks different (for instance, part of it opens up).  Bleeding or fluid leaking from the incision site, and weren't told to expect that.  Fever of 100.4°F (38°C) or higher, or as directed by your provider.  Call 911  Call 911 right away if you have:   Trouble breathing  Facial swelling    If you have obstructive sleep apnea   You were given anesthesia medicine during surgery to keep you comfortable and free of pain. After surgery, you may have more apnea spells because of this medicine and other medicines you were given. The spells may last longer than normal.    At home:  Keep using the continuous positive airway pressure (CPAP)  device when you sleep. Unless your healthcare provider tells you not to, use it when you sleep, day or night. CPAP is a common device used to treat obstructive sleep apnea.  Talk with your provider before taking any pain medicine, muscle relaxants, or sedatives. Your provider will tell you about the possible dangers of taking these medicines.  Contact your provider if your sleeping changes a lot even when taking medicines as directed.  [employer] last reviewed this educational content on 10/1/2021  © 7647-5428 [employer]. All rights reserved. This information is not intended as a substitute for professional medical care. Always follow your healthcare professional's instructions.

## 2024-11-28 NOTE — TELEPHONE ENCOUNTER
Please review.  Protocol failed / Has no protocol.      The original prescription was discontinued on 11/27/2024 by Aysha Avelar, RN for the following reason: Therapy completed. Renewing this prescription may not be appropriate.   No further explanation in chart about discontinued. No encounter for 11/27/24.     Requested Prescriptions   Pending Prescriptions Disp Refills    LOSARTAN 25 MG Oral Tab [Pharmacy Med Name: Losartan Potassium 25 Mg Tab Camb] 90 tablet 0     Sig: Take 1 tablet (25 mg total) by mouth daily.       Hypertension Medications Protocol Failed - 11/28/2024 12:14 PM        Failed - Last BP reading less than 140/90     BP Readings from Last 1 Encounters:   10/30/24 143/76               Passed - CMP or BMP in past 12 months        Passed - In person appointment or virtual visit in the past 12 mos or appointment in next 3 mos     Recent Outpatient Visits              3 weeks ago Benign neoplasm of skin of upper limb, including shoulder, left    [facility] Dexter Garcia MD    Office Visit    4 weeks ago Abscess of left thumb    [facility] Weiler, Colleen M, DO    Office Visit    4 months ago Encounter for annual health examination    [facility] Weiler, Colleen M, DO    Office Visit    5 months ago Tinnitus of both ears    [facility] Bárbara Ortiz MD    Office Visit    6 months ago Acute recurrent frontal sinusitis    [facility] Weiler, Colleen M, DO    Office Visit          Future Appointments         Provider Department Appt Notes    In 5 days Dexter Garcia MD [facility] EXCISION MASS LTH FLAP RECONSTRUCTION; NORCO 7.5MG; 12/17 OT RN, 12/19 OT, 12/23 OT    In 2 weeks TIGIST FREY RN [facility],  [place] OT 2ND    In 2 weeks Sai Harrison OTRL [facility] [place], [place] RN 1ST    In 3 weeks Sai Harrison OTRL [facility] [place], [place] OT    In 3 weeks Sai Harrison OTRL [facility] [place], [place] OT    In 1 month Luz Baez APRN [facility]     In 2 months [facility] DEXA [address] [place] [address] for Health                     Passed - EGFRCR or GFRAA > 50     GFR Evaluation  EGFRCR: 70 , resulted on 7/10/2024             Future Appointments         Provider Department Appt Notes    In 5 days Dexter Garcia MD [facility] [place], [place] EXCISION MASS LTH FLAP RECONSTRUCTION; NORCO 7.5MG; 12/17 OT RN, 12/19 OT, 12/23 OT    In 2 weeks EC TK MAHAN [facility] [place], [place] OT 2ND    In 2 weeks Sai Harrison OTRL [facility] [place], [place] RN 1ST    In 3 weeks Sai Harrison OTRL [facility] [place], [place] OT    In 3 weeks Sai Harrison OTELIZABETH [facility] [place], [place] OT    In 1 month Luz Baez APRN [facility]     In 2 months [facility] DEXA [address] for Health           Recent Outpatient Visits              3 weeks ago Benign neoplasm of skin of upper limb, including shoulder, left    [facility]    Office Visit    4 weeks ago Abscess of left thumb    [facility] Weiler, Colleen M, DO    Office Visit    4 months ago Encounter for annual health examination    [facility] Weiler, Colleen M, DO    Office Visit    5 months ago Tinnitus of both ears    [facility] [place],  Bárbara ZAZUETA MD    Office Visit    6 months ago Acute recurrent frontal sinusitis    [facility] We[facility], Colleen M, DO    Office Visit

## 2024-11-29 RX ORDER — LOSARTAN POTASSIUM 25 MG/1
25 TABLET ORAL DAILY
Qty: 90 TABLET | Refills: 0 | Status: SHIPPED | OUTPATIENT
Start: 2024-11-29

## 2024-12-02 ENCOUNTER — ANESTHESIA EVENT (OUTPATIENT)
Dept: SURGERY | Facility: HOSPITAL | Age: 74
End: 2024-12-02
Payer: MEDICARE

## 2024-12-03 ENCOUNTER — HOSPITAL DOCUMENTATION (OUTPATIENT)
Dept: SURGERY | Facility: CLINIC | Age: 74
End: 2024-12-03

## 2024-12-03 ENCOUNTER — HOSPITAL ENCOUNTER (OUTPATIENT)
Facility: HOSPITAL | Age: 74
Setting detail: HOSPITAL OUTPATIENT SURGERY
Discharge: HOME | End: 2024-12-03
Attending: PLASTIC SURGERY | Admitting: PLASTIC SURGERY
Payer: MEDICARE

## 2024-12-03 ENCOUNTER — ANESTHESIA (OUTPATIENT)
Dept: SURGERY | Facility: HOSPITAL | Age: 74
End: 2024-12-03
Payer: MEDICARE

## 2024-12-03 VITALS
HEIGHT: 64 IN | OXYGEN SATURATION: 93 % | TEMPERATURE: 98 F | SYSTOLIC BLOOD PRESSURE: 125 MMHG | WEIGHT: 208 LBS | HEART RATE: 69 BPM | RESPIRATION RATE: 16 BRPM | DIASTOLIC BLOOD PRESSURE: 66 MMHG | BODY MASS INDEX: 35.51 KG/M2

## 2024-12-03 DIAGNOSIS — Q69.1: ICD-10-CM

## 2024-12-03 DIAGNOSIS — D23.62: Primary | ICD-10-CM

## 2024-12-03 PROBLEM — Z04.9 OBSERVATION FOR SUSPECTED CONDITION: Status: ACTIVE | Noted: 2024-12-03

## 2024-12-03 LAB
GLUCOSE BLDC GLUCOMTR-MCNC: 155 MG/DL (ref 70–99)
GLUCOSE BLDC GLUCOMTR-MCNC: 158 MG/DL (ref 70–99)

## 2024-12-03 PROCEDURE — 14040 TIS TRNFR F/C/C/M/N/A/G/H/F: CPT | Performed by: PLASTIC SURGERY

## 2024-12-03 PROCEDURE — 0JXK0ZB TRANSFER LEFT HAND SUBCUTANEOUS TISSUE AND FASCIA WITH SKIN AND SUBCUTANEOUS TISSUE, OPEN APPROACH: ICD-10-PCS | Performed by: PLASTIC SURGERY

## 2024-12-03 PROCEDURE — 0JBK0ZZ EXCISION OF LEFT HAND SUBCUTANEOUS TISSUE AND FASCIA, OPEN APPROACH: ICD-10-PCS | Performed by: PLASTIC SURGERY

## 2024-12-03 RX ORDER — HYDROMORPHONE HYDROCHLORIDE 1 MG/ML
0.2 INJECTION, SOLUTION INTRAMUSCULAR; INTRAVENOUS; SUBCUTANEOUS EVERY 5 MIN PRN
Status: DISCONTINUED | OUTPATIENT
Start: 2024-12-03 | End: 2024-12-03

## 2024-12-03 RX ORDER — FAMOTIDINE 20 MG/1
20 TABLET, FILM COATED ORAL ONCE
Status: DISCONTINUED | OUTPATIENT
Start: 2024-12-03 | End: 2024-12-03 | Stop reason: HOSPADM

## 2024-12-03 RX ORDER — HYDROCODONE BITARTRATE AND ACETAMINOPHEN 7.5; 325 MG/1; MG/1
1 TABLET ORAL EVERY 4 HOURS PRN
Status: DISCONTINUED | OUTPATIENT
Start: 2024-12-03 | End: 2024-12-03

## 2024-12-03 RX ORDER — EPHEDRINE SULFATE 50 MG/ML
INJECTION INTRAVENOUS AS NEEDED
Status: DISCONTINUED | OUTPATIENT
Start: 2024-12-03 | End: 2024-12-03 | Stop reason: SURG

## 2024-12-03 RX ORDER — LIDOCAINE HYDROCHLORIDE 10 MG/ML
INJECTION, SOLUTION EPIDURAL; INFILTRATION; INTRACAUDAL; PERINEURAL AS NEEDED
Status: DISCONTINUED | OUTPATIENT
Start: 2024-12-03 | End: 2024-12-03 | Stop reason: SURG

## 2024-12-03 RX ORDER — METOCLOPRAMIDE 10 MG/1
10 TABLET ORAL ONCE
Status: COMPLETED | OUTPATIENT
Start: 2024-12-03 | End: 2024-12-03

## 2024-12-03 RX ORDER — FAMOTIDINE 10 MG/ML
20 INJECTION, SOLUTION INTRAVENOUS ONCE
Status: DISCONTINUED | OUTPATIENT
Start: 2024-12-03 | End: 2024-12-03 | Stop reason: HOSPADM

## 2024-12-03 RX ORDER — DEXAMETHASONE SODIUM PHOSPHATE 4 MG/ML
VIAL (ML) INJECTION AS NEEDED
Status: DISCONTINUED | OUTPATIENT
Start: 2024-12-03 | End: 2024-12-03 | Stop reason: SURG

## 2024-12-03 RX ORDER — NICOTINE POLACRILEX 4 MG
30 LOZENGE BUCCAL
Status: DISCONTINUED | OUTPATIENT
Start: 2024-12-03 | End: 2024-12-03

## 2024-12-03 RX ORDER — ACETAMINOPHEN 500 MG
1000 TABLET ORAL ONCE
Status: COMPLETED | OUTPATIENT
Start: 2024-12-03 | End: 2024-12-03

## 2024-12-03 RX ORDER — NALOXONE HYDROCHLORIDE 0.4 MG/ML
0.08 INJECTION, SOLUTION INTRAMUSCULAR; INTRAVENOUS; SUBCUTANEOUS AS NEEDED
Status: DISCONTINUED | OUTPATIENT
Start: 2024-12-03 | End: 2024-12-03

## 2024-12-03 RX ORDER — CLINDAMYCIN PHOSPHATE 150 MG/ML
INJECTION, SOLUTION INTRAVENOUS AS NEEDED
Status: DISCONTINUED | OUTPATIENT
Start: 2024-12-03 | End: 2024-12-03 | Stop reason: SURG

## 2024-12-03 RX ORDER — DEXTROSE MONOHYDRATE 25 G/50ML
50 INJECTION, SOLUTION INTRAVENOUS
Status: DISCONTINUED | OUTPATIENT
Start: 2024-12-03 | End: 2024-12-03

## 2024-12-03 RX ORDER — ONDANSETRON 2 MG/ML
INJECTION INTRAMUSCULAR; INTRAVENOUS AS NEEDED
Status: DISCONTINUED | OUTPATIENT
Start: 2024-12-03 | End: 2024-12-03 | Stop reason: SURG

## 2024-12-03 RX ORDER — METOCLOPRAMIDE HYDROCHLORIDE 5 MG/ML
10 INJECTION INTRAMUSCULAR; INTRAVENOUS ONCE
Status: COMPLETED | OUTPATIENT
Start: 2024-12-03 | End: 2024-12-03

## 2024-12-03 RX ORDER — SODIUM CHLORIDE, SODIUM LACTATE, POTASSIUM CHLORIDE, CALCIUM CHLORIDE 600; 310; 30; 20 MG/100ML; MG/100ML; MG/100ML; MG/100ML
INJECTION, SOLUTION INTRAVENOUS CONTINUOUS
Status: DISCONTINUED | OUTPATIENT
Start: 2024-12-03 | End: 2024-12-03

## 2024-12-03 RX ORDER — METOPROLOL TARTRATE 25 MG/1
25 TABLET, FILM COATED ORAL ONCE AS NEEDED
Status: DISCONTINUED | OUTPATIENT
Start: 2024-12-03 | End: 2024-12-03 | Stop reason: HOSPADM

## 2024-12-03 RX ORDER — PHENYLEPHRINE HCL 10 MG/ML
VIAL (ML) INJECTION AS NEEDED
Status: DISCONTINUED | OUTPATIENT
Start: 2024-12-03 | End: 2024-12-03 | Stop reason: SURG

## 2024-12-03 RX ORDER — NICOTINE POLACRILEX 4 MG
15 LOZENGE BUCCAL
Status: DISCONTINUED | OUTPATIENT
Start: 2024-12-03 | End: 2024-12-03

## 2024-12-03 RX ADMIN — CLINDAMYCIN PHOSPHATE 900 MG: 150 INJECTION, SOLUTION INTRAVENOUS at 07:31:00

## 2024-12-03 RX ADMIN — EPHEDRINE SULFATE 10 MG: 50 INJECTION INTRAVENOUS at 08:06:00

## 2024-12-03 RX ADMIN — LIDOCAINE HYDROCHLORIDE 50 MG: 10 INJECTION, SOLUTION EPIDURAL; INFILTRATION; INTRACAUDAL; PERINEURAL at 07:27:00

## 2024-12-03 RX ADMIN — DEXAMETHASONE SODIUM PHOSPHATE 4 MG: 4 MG/ML VIAL (ML) INJECTION at 07:41:00

## 2024-12-03 RX ADMIN — ONDANSETRON 4 MG: 2 INJECTION INTRAMUSCULAR; INTRAVENOUS at 07:41:00

## 2024-12-03 RX ADMIN — EPHEDRINE SULFATE 5 MG: 50 INJECTION INTRAVENOUS at 07:55:00

## 2024-12-03 RX ADMIN — PHENYLEPHRINE HCL 100 MCG: 10 MG/ML VIAL (ML) INJECTION at 07:43:00

## 2024-12-03 NOTE — ANESTHESIA PREPROCEDURE EVALUATION
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]    Relevant Problems   No relevant active problems       NPO:  Last Liquid Consumption Date: 12/02/24  Last Liquid Consumption Time: 2100  Last Solid Consumption Date: 12/02/24  Last Solid Consumption Time: 2100  Last Liquid Consumption Date: 12/02/24          History Review:  Patient Active Problem List    Diagnosis Date Noted    Diverticulosis     Age-related nuclear cataract of both eyes 12/13/2018    Severe obesity (BMI 35.0-39.9) with comorbidity (HCC) 02/22/2018    Type 2 diabetes mellitus without retinopathy (HCC) 02/22/2018    Asthma with COPD (chronic obstructive pulmonary disease) (HCC) 04/05/2017    Obstructive sleep apnea 06/02/2010       Past Medical History:    Arrhythmia    Asthma (HCC)    Chest pain    Diabetes (HCC)    Diverticulosis    Esophageal reflux    Extrinsic asthma, unspecified    High cholesterol    History of blood transfusion    no reaction    Pneumonia due to organism    Polyp of colon    None in 2020. Repeat CLN in 2025 if patient interested in screening/remains in good health    Pulmonary embolism (HCC)    Sleep apnea    Visual impairment    wear glasses       Past Surgical History:   Procedure Laterality Date    Cardiac catheterization      Cholecystectomy      Colonoscopy      Colonoscopy N/A 7/25/2017    Procedure: COLONOSCOPY;  Surgeon: RUPERT Infante MD;  Location: [facility] ENDOSCOPY    Colonoscopy N/A 9/23/2020    Procedure: COLONOSCOPY;  Surgeon: RUPERT Infante MD;  Location: [facility] ENDOSCOPY    Hysterectomy         Prescriptions Prior to Admission[1]  Current Medications and Prescriptions Ordered in Epic[2]    Allergies[3]    Family History   Problem Relation Age of Onset    Diabetes  Mother 67        CAUSE OF DEATH    Diabetes Sister     Cancer Sister 53        RENAL  and pancreatic ca    Hypertension Brother     Other (DVT) Son     Glaucoma Neg     Macular degeneration Neg      Social History     Socioeconomic History    Marital status:    Tobacco Use    Smoking status: Former     Current packs/day: 0.00     Types: Cigarettes     Start date: 1950     Quit date: 1980     Years since quittin.9    Smokeless tobacco: Never   Vaping Use    Vaping status: Never Used   Substance and Sexual Activity    Alcohol use: Not Currently     Alcohol/week: 1.0 standard drink of alcohol     Types: 1 Shots of liquor per week     Comment: \"socially\"    Drug use: No   Other Topics Concern    Right Handed Yes       Available pre-op labs reviewed.     Lab Results   Component Value Date    PGLU 158 (H) 2024          Vital Signs:  Body mass index is 35.7 kg/m².   height is 1.626 m (5' 4\") and weight is 94.3 kg (208 lb). Her oral temperature is 98.3 °F (36.8 °C). Her blood pressure is 155/79 and her pulse is 67. Her respiration is 16 and oxygen saturation is 95%.   Vitals:    24 1205 24 0610   BP:  155/79   Pulse:  67   Resp:  16   Temp:  98.3 °F (36.8 °C)   TempSrc:  Oral   SpO2:  95%   Weight: 92.1 kg (203 lb) 94.3 kg (208 lb)   Height: 1.626 m (5' 4\")         Anesthesia Evaluation     Patient summary reviewed and Nursing notes reviewed    Airway   Mallampati: III  TM distance: <3 FB  Neck ROM: limited  Dental      Pulmonary     breath sounds clear to auscultation  (+) COPD, asthma, sleep apnea  Cardiovascular     Rhythm: regular    Neuro/Psych      GI/Hepatic/Renal    (+) GERD    Endo/Other    (+) diabetes mellitus  Abdominal                  Anesthesia Plan:   ASA:  3  Plan:   General  Airway:  LMA  Informed Consent Plan and Risks Discussed With:  Patient  Discussed plan with:  Attending      I have informed Silvana Morales and/or legal guardian or family member of the nature of  the anesthetic plan, benefits, risks including possible dental damage if relevant, major complications, and any alternative forms of anesthetic management.   All of the patient's questions were answered to the best of my ability. The patient desires the anesthetic management as planned.  Bella Antunez MD  12/3/2024 7:20 AM  Present on Admission:  **None**           [1]   Medications Prior to Admission   Medication Sig Dispense Refill Last Dose/Taking    IBUPROFEN OR Take 1 tablet by mouth daily as needed.   2024    MELATONIN OR Take 1 tablet by mouth at bedtime.   2024    metoprolol succinate ER 25 MG Oral Tablet 24 Hr Take 1 tablet (25 mg total) by mouth daily.   12/3/2024 at  4:15 AM    fluticasone-salmeterol 250-50 MCG/ACT Inhalation Aerosol Powder, Breath Activated INHALE ONE PUFF BY MOUTH EVERY 12 HOURS 60 each 3 2024 at  7:00 AM    metFORMIN 500 MG Oral Tab Take 1 tablet (500 mg total) by mouth 2 (two) times daily with meals. 180 tablet 3 2024 at  9:00 PM    [] nystatin 100,000 Units/g External Cream Apply 1 Application topically 2 (two) times daily for 7 days. (Patient taking differently: Apply 1 Application topically daily as needed.) 30 g 0 Taking Differently    pravastatin 10 MG Oral Tab Take 1 tablet (10 mg total) by mouth nightly. 90 tablet 3 12/3/2024 at  3:00 AM    montelukast 10 MG Oral Tab Take 1 tablet (10 mg total) by mouth nightly. At Bedtime 90 tablet 3 2024    omeprazole 20 MG Oral Capsule Delayed Release Take 1 capsule (20 mg total) by mouth every morning. 90 capsule 3 12/3/2024 at  4:15 AM    fluticasone propionate (FLONASE) 50 MCG/ACT Nasal Suspension 2 sprays by Nasal route daily. 3 each 3 2024 at  8:00 AM    aspirin 81 MG Oral Tab Take 1 tablet (81 mg total) by mouth daily.   2024    Cyanocobalamin (VITAMIN B-12 CR OR) Take  by mouth.   2024 at  8:00 AM    Multiple Vitamins-Minerals (CENTRUM SILVER) Oral Tab Take  by mouth. Take one tablet  by mouth daily   12/2/2024 at  8:00 AM    losartan 25 MG Oral Tab Take 1 tablet (25 mg total) by mouth daily. 90 tablet 0 More than a month    albuterol 108 (90 Base) MCG/ACT Inhalation Aero Soln Inhale 2 puffs into the lungs every 4 (four) hours as needed for Wheezing. 25.5 g 3 More than a month    HYDROcodone-acetaminophen 7.5-325 MG Oral Tab Take 1 tablet by mouth every 4 to 6 hours as needed for Pain. 10 tablet 0 More than a month    Accu-Chek Softclix Lancets Does not apply Misc 1 Lancet by Finger stick route 2 (two) times daily. 200 each 3     Glucose Blood (ACCU-CHEK LORRAINE PLUS) In Vitro Strip Check blood sugar twice daily 200 strip 3     Accu-Chek Softclix Lancets Does not apply Misc Check blood glucose once daily. 100 each 1     Spacer/Aero-Holding Chambers (COMPACT SPACE CHAMBER) Does not apply Device TO BE USED WITH ALBUTEROL, INHALE 2 PUFFS EVERY 4 HOURS AS NEEDED       meclizine 25 MG Oral Tab Take 1 tablet (25 mg total) by mouth 2 (two) times daily as needed. 60 tablet 0 More than a month    Glucose Blood In Vitro Strip place 1 by Misc.(Non-Drug; Combo Route) route 3 times every day with glucose monitor 100 strip 3     Blood Glucose Monitoring Suppl (BLOOD GLUCOSE MONITOR SYSTEM) w/Device Does not apply Kit One Touch machine. Please check accucheck once daily 1 kit 0    [2]   Current Facility-Administered Medications Ordered in Epic   Medication Dose Route Frequency Provider Last Rate Last Admin    lactated ringers infusion   Intravenous Continuous Dexter Garcia MD 20 mL/hr at 12/03/24 0619 New Bag at 12/03/24 0619    metoprolol tartrate (Lopressor) tab 25 mg  25 mg Oral Once PRN Dexter Garcia MD        famotidine (Pepcid) tab 20 mg  20 mg Oral Once Dexter Garcia MD        Or    famotidine (Pepcid) 20 mg/2mL injection 20 mg  20 mg Intravenous Once Dexter Garcia MD        ceFAZolin (Ancef) 2g in 10mL IV syringe premix  2 g Intravenous Once Dexter Garcia MD          No current Epic-ordered outpatient medications on file.   [3]   Allergies  Allergen Reactions    Penicillins WHEEZING     No skin peeling, no blister formation, no organ damage

## 2024-12-03 NOTE — BRIEF OP NOTE
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

## 2024-12-03 NOTE — OPERATIVE REPORT
[facility]    PATIENT'S NAME: SANTOS HAIRSTON   ATTENDING PHYSICIAN: Dexter Tobin MD   OPERATING PHYSICIAN: Dexter Tobin MD   PATIENT ACCOUNT#:   [id number]    LOCATION:  [facility] PACU 3 [facility] 10  MEDICAL RECORD #:   [id number]       YOB: 1950  ADMISSION DATE:       12/03/2024      OPERATION DATE:  12/03/2024    OPERATIVE REPORT      PREOPERATIVE DIAGNOSIS:  Left thumb mass.  POSTOPERATIVE DIAGNOSIS:  Left thumb mass.  PROCEDURE:  Excision of left thumb mass, rotation flap reconstruction.    INDICATIONS:  A 74-year-old female, right-hand dominant, with a mass of the left thumb.  She has had a \"bump\" there most of her life.  Over the last 20 years, it has increase in size and become painful.  It bleeds.  It has been infected once.  She is now admitted to the operating amphitheater for excision.    FINDINGS:  A firm mass measuring 14 x 13 mm and protruding 20 mm is present on the radial aspect of the proximal portion of the proximal phalanx of the thumb.    OPERATIVE TECHNIQUE:  The patient was placed under general anesthesia.  Hand and forearm were prepped and draped in usual sterile fashion.  A pneumatic tourniquet was inflated to 250 mmHg.    We circumscribed the base of the mass and it was dissected from surrounding structures.  A proximal neurectomy was performed of 1 nerve extending into the digit and we electrocauterized the feeding vessel.    This left us with a sizable defect which could not be closed directly; hence, we incised proximally as well as distally and dorsally and elevated a dorsally-based flap which rotate volarly and somewhat proximally to fill the soft tissue defect.  Skin edges were reapproximated with 4-0 Prolene.  A thumb spica splint was placed.    The tourniquet was released.  Total tourniquet time 23 minutes.    The patient tolerated the procedure well and left the operating suite in satisfactory condition.    Dictated By Dexter Tobin,  MD  d: 12/03/2024 08:21:32  t: 12/03/2024 08:37:19  [facility] [id number]  J/    cc: Raymond V. Janevicius, MD Colleen Weiler,

## 2024-12-03 NOTE — PLAN OF CARE
Per policy- Post-Operative Care and Discharge Criteria, PAT_Prep_Rec_4.1    The patient is provided with written instructions, which the Pre-Op/Recovery Unit nurse reviews with the patient and/or significant other person responsible for the patient at home.    Patients who receive other than local anesthesia are accompanied at discharge by a designated adult who is responsible, or assumes responsibility for the patient.    Pt meets Discharge Criteria  Fully awake and oriented.  Takes oral fluids.  No persistent nausea or vomiting.  Ambulate, when applicable.  No excessive pain or bleeding present.  Vital signs stable or within 20% of pre-operative status.  Voiding if applicable  IV discontinued, site clean and dry and intact.  *Nursing personnel are allowed to discharge patient, based on the above approved discharge criteria.    Pt changed into clothes by self & with assist of  .    Asked if there were any needs/questions at this time.   All needs/questions addressed to Pt satisfaction.  No further issues noted at this time.    Monica ZAZUETA RN  \"Shirin\"

## 2024-12-03 NOTE — PLAN OF CARE
Per policy- Discharge from PACU to a Nursing Unit, PACU_2.05    Dolores Post Anesthesia Recovery Score per PACU staff= 10     Patients are discharged from the PACU or recovery area when they have achieved a score of 8 or greater on the Dolores Post Anesthesia Recovery Score for Discharge to an inpatient room or Pre-op Recovery. Patients may be discharged to CCU/PCCU with scores <8.    ACTIVITY  Able to move 4 extremities voluntarily or on command=2  Able to move 2 extremities voluntarily or on command=1  Unable to move extremities voluntarily or on command=0    RESPIRATION  Able to breathe deeply and cough freely=2  Dyspneic, limited breathing or tachypnea=1  Apneic or on mechanical ventilator=0    CIRCULATION  BP +/- 20% of preanesthetic level=2  BP +/- 20-49 % of preanesthetic level=1  BP +/- 51% of preanesthetic level=0    CONSCIOUSNESS  Fully Awake=2  Arousable on calling=1  Not responding=0    Oxygen Saturation  Able to maintain baseline room air=2  Need 02 inhalation to maintain 02 Saturation >90%=1  02 Saturation <90% even with 02 supplement=0    PAIN= 0  Pain is at a level of 4 or below or at 50% of the incoming pain rating.     Report received from Sai ZAZUETA RN  \"Shirin\"

## 2024-12-03 NOTE — PLAN OF CARE
Pt arrived to Post-op SDS department. Arrived via cart by OR transport. Here with  and called into room after Pt assessment completed. Encouraged to drink and eat and void. All belongings at bedside.     Postop sign and held orders reviewed, released and followed.     Pt in no acute distress. All questions and needs acknowledged and addressed to satisfaction. Pt given call light and aware to call with needs.     Monica ZAZUETA RN  \"Shirin\"

## 2024-12-03 NOTE — ANESTHESIA POSTPROCEDURE EVALUATION
Patient: Silvana Morales    Procedure Summary       Date: 12/03/24 Room / Location: [facility] MAIN OR  / [facility] MAIN OR    Anesthesia Start: 0722 Anesthesia Stop: 0817    Procedure: Excision mass left thumb flap reconstruction (Left) Diagnosis:       Preaxial polydactyly of hand      (Preaxial polydactyly of hand [Q69.1])    Surgeons: Dexter Garcia MD Anesthesiologist: Bella Antunez MD    Anesthesia Type: general ASA Status: 3            Anesthesia Type: general    Vitals Value Taken Time   /66 12/03/24 0832   Temp 98 °F (36.7 °C) 12/03/24 0822   Pulse 69 12/03/24 0838   Resp 16 12/03/24 0838   SpO2 93 % 12/03/24 0838   Vitals shown include unfiled device data.    [facility] AN Post Evaluation:   Patient Evaluated in PACU  Patient Participation: complete - patient participated  Level of Consciousness: awake  Pain Score: 0  Pain Management: adequate  Airway Patency:patent  Dental exam unchanged from preop  Yes    Nausea/Vomiting: none  Cardiovascular Status: acceptable  Respiratory Status: acceptable  Postoperative Hydration acceptable      Bella Antunez MD  12/3/2024 9:11 AM

## 2024-12-03 NOTE — ANESTHESIA PROCEDURE NOTES
Airway  Date/Time: 12/3/2024 7:31 AM  Urgency: elective      General Information and Staff    Patient location during procedure: OR  Anesthesiologist: Bella Antunez MD  Performed: anesthesiologist   Performed by: Bella Antunez MD  Authorized by: Bella Antunez MD      Indications and Patient Condition  Indications for airway management: anesthesia  Spontaneous Ventilation: absent  Sedation level: deep  Preoxygenated: yes  Patient position: sniffing  Mask difficulty assessment: 1 - vent by mask    Final Airway Details  Final airway type: supraglottic airway      Successful airway: classic  Size 4       Number of attempts at approach: 1  Number of other approaches attempted: 0

## 2024-12-04 ENCOUNTER — TELEPHONE (OUTPATIENT)
Dept: SURGERY | Facility: CLINIC | Age: 74
End: 2024-12-04

## 2024-12-04 NOTE — TELEPHONE ENCOUNTER
Left voice message for pt to keep dressings and splint CDI,elevate LH at all times in the sling,next appointment 12/17/24 with RN/OT and please call the office with any questions and/or concerns.

## 2024-12-10 ENCOUNTER — TELEPHONE (OUTPATIENT)
Dept: FAMILY MEDICINE CLINIC | Facility: CLINIC | Age: 74
End: 2024-12-10

## 2024-12-10 NOTE — TELEPHONE ENCOUNTER
Patient called office. Date of birth and full name both confirmed.   Asking for pain medication refill - 12/3/24 left Hand Surgery with Dr Dexter Frey     Per patient - she has a new pain in her hand.   RN advised her to contact the Surgeon's office now and discuss with surgeon's Nurse for further guidance -- may need sooner follow-up appointment (scheduled 12/17/24 at this time) or maybe they can provide the refill     She verbalizes understanding of all information, and agreeable to plan.           Future Appointments   Date Time Provider Department [place]   12/17/2024 12:00 PM TIGIST FREY RN [facility]   12/17/2024 12:30 PM Sai Harrison OTRL [facility]   12/19/2024  2:00 PM Sai Harrison OTRL [facility]   12/23/2024  1:00 PM Sai Harrison OTRL [facility]   1/6/2025  2:00 PM Luz Baez APRN [facility]   1/31/2025  2:00 PM [facility] DEXA RM1 [facility] DEXA EM [facility]

## 2024-12-13 ENCOUNTER — TELEPHONE (OUTPATIENT)
Dept: SURGERY | Facility: CLINIC | Age: 74
End: 2024-12-13

## 2024-12-13 NOTE — TELEPHONE ENCOUNTER
Spoke with pt.  Pt c/o intermittent \"stinging\" LTH. Rates pain  8/10.  Has taken a couple of tylenol prn for pain, helps a little.  Requesting refill of Norco po.  Denies numbness and tingling.  Splint and ace wrap CDI, not too tight.  No s/s of edema.  Offered for pt to come in to office to have dressing checked,refused.  Elevating in sling at all times,but it slides down sometimes per pt.  Pt told typically a refill of Norco is not needed after this surgery, continue to elevate LH at the level of the heart at all times, can try holding her hand over her head for a few minutes frequently through out the day may provide relief, try taking OTC pain medication per manufactures instructions to relieve [pain, Ibuprofen if you tolerate it may provide more relief then tylenol ,you shouldn't be using the L hand and please let us know if you have any other questions and/or concerns.  Verbalized understanding.

## 2024-12-16 ENCOUNTER — TELEPHONE (OUTPATIENT)
Dept: SURGERY | Facility: CLINIC | Age: 74
End: 2024-12-16

## 2024-12-16 DIAGNOSIS — Q69.1: Primary | ICD-10-CM

## 2024-12-17 ENCOUNTER — APPOINTMENT (OUTPATIENT)
Dept: SURGERY | Facility: CLINIC | Age: 74
End: 2024-12-17
Payer: MEDICARE

## 2024-12-17 ENCOUNTER — NURSE ONLY (OUTPATIENT)
Dept: SURGERY | Facility: CLINIC | Age: 74
End: 2024-12-17
Payer: MEDICARE

## 2024-12-17 DIAGNOSIS — M62.81 DISTAL MUSCLE WEAKNESS: Primary | ICD-10-CM

## 2024-12-17 DIAGNOSIS — D23.62: ICD-10-CM

## 2024-12-17 DIAGNOSIS — Z48.02 VISIT FOR SUTURE REMOVAL: Primary | ICD-10-CM

## 2024-12-17 DIAGNOSIS — M25.642 STIFFNESS OF JOINT, HAND, LEFT: ICD-10-CM

## 2024-12-17 PROCEDURE — 1126F AMNT PAIN NOTED NONE PRSNT: CPT | Performed by: PLASTIC SURGERY

## 2024-12-17 PROCEDURE — 1159F MED LIST DOCD IN RCRD: CPT | Performed by: PLASTIC SURGERY

## 2024-12-17 PROCEDURE — 97110 THERAPEUTIC EXERCISES: CPT | Performed by: OCCUPATIONAL THERAPIST

## 2024-12-17 PROCEDURE — 1159F MED LIST DOCD IN RCRD: CPT | Performed by: OCCUPATIONAL THERAPIST

## 2024-12-17 PROCEDURE — 97165 OT EVAL LOW COMPLEX 30 MIN: CPT | Performed by: OCCUPATIONAL THERAPIST

## 2024-12-17 NOTE — PROGRESS NOTES
Surgery 1: L TH mass  - Date: 24  - Days Since: 14   Pt is in the office today for a Nurse visit for suture removal LTH then OT  Pt identified by name and .  Orders reviewed.  Denies pain,numbness and tingling.  Took Ibuprofen 600 mg po prior to appointment fro suture removal.  Arrived with no sling, coban and splint CDI,removed.  LTH incision with sutures CDI,edges well approximated without s/s of infection,distal LTH with edema.  Sutures removed without difficulty. Pt very nervous about having them removed and complained of pain, provided a lot of support and reassurance, provided breaks.  Pt washed scar at the sink with soap and water then escorted to OT.  Reminded next appointment 24 and to please call the office with any questions and/or concerns.  Verbalized understanding.  Dr Tobin notified.    Sutures removed.  Scar healing well without s/s of infection.  Distal LTH edema,arrived without sling.  To OT  24 OT  24 OT

## 2024-12-18 NOTE — PROGRESS NOTES
OCCUPATIONAL THERAPY EVALUATION:   Silvana Morales   [id number]       SUBJECTIVE:    HX of Injury: Left Thumb Mass  Chief Complaint:  Left Thumb tightness..    Precautions: None  Premorbid Functional Status: Independent w/ driving / sitting, Independent w/ ADL's  Current Level of Function: As noted above  Employment: Retired  Hand Dominance: right  Living Situation: Family  Barriers to Learning: None  Patient Goals: Full use of the left hand.    Imaging/Tests: N/A        OBJECTIVE DATA:   PAIN:   Rating (1/10): 1/10 at rest, 1/10 with activity  Location:       SCAR/INCISION: Flat, Non-adhered, and Flexible    SENSORY:  Intact      AROM/PROM:  (Degrees)  LEFT HAND:    Thumb IF MF RF SF   MP 50       PIP 40       DIP        FRANCIS 90 FRANCIS                 STRENGTH: (lbs) Right Average Left Average   : NT NT   2 pt Pinch:     3 pt Pinch:     Lateral Pinch:         ASSESSMENT & PLAN OF CARE:    Treatment Provided: Patient was seen for an initial evaluation, hand washing: Following suture removal by nursing, OT reviewed cold cream scar massage technique  HEP:  AROM, Tendon glides, x 20 reps per set, x 5 sets daily. Reviewed hand elevation importance. Written handout was provided to reinforce today's treatment and educational session.       Rehabilitation Potential: Excellent    CLINICAL ASSESSMENT:    Patient/Caregiver Education Provided: Yes    Treatment Plan:  Therapeutic Exercise  Therapeutic Activities  Scar Management  Patient/Family Education    GOALS:  Short term goals to be reached in x 1 session:    1) Independent with HEP..    Long term goals to be reached in x 1 - 2 weeks:    1) Full functional use of the involved extremity for self-care, leisure and work related tasks:.        Patient will be seen 1 x /week for 2 weeks or a total of 2 visits.   Pt. was advised regarding the findings of this evaluation and agrees to the plan of care.     Sai ZAZUETA OTRL

## 2024-12-30 ENCOUNTER — APPOINTMENT (OUTPATIENT)
Dept: GENERAL RADIOLOGY | Age: 74
End: 2024-12-30
Attending: NURSE PRACTITIONER
Payer: MEDICARE

## 2024-12-30 ENCOUNTER — HOSPITAL ENCOUNTER (OUTPATIENT)
Age: 74
Discharge: HOME | End: 2024-12-30
Payer: MEDICARE

## 2024-12-30 VITALS
SYSTOLIC BLOOD PRESSURE: 123 MMHG | TEMPERATURE: 98 F | HEART RATE: 80 BPM | DIASTOLIC BLOOD PRESSURE: 60 MMHG | OXYGEN SATURATION: 98 % | RESPIRATION RATE: 20 BRPM

## 2024-12-30 DIAGNOSIS — J11.1 INFLUENZA: Primary | ICD-10-CM

## 2024-12-30 LAB
POCT INFLUENZA A: POSITIVE
POCT INFLUENZA B: NEGATIVE

## 2024-12-30 PROCEDURE — 71046 X-RAY EXAM CHEST 2 VIEWS: CPT | Performed by: NURSE PRACTITIONER

## 2024-12-30 PROCEDURE — 99214 OFFICE O/P EST MOD 30 MIN: CPT | Performed by: NURSE PRACTITIONER

## 2024-12-30 PROCEDURE — 87502 INFLUENZA DNA AMP PROBE: CPT | Performed by: NURSE PRACTITIONER

## 2024-12-30 RX ORDER — OSELTAMIVIR PHOSPHATE 75 MG/1
75 CAPSULE ORAL 2 TIMES DAILY
Qty: 10 CAPSULE | Refills: 0 | Status: SHIPPED | OUTPATIENT
Start: 2024-12-30 | End: 2025-01-04

## 2024-12-30 NOTE — ED PROVIDER NOTES
Patient Seen in: [facility]      History     Chief Complaint   Patient presents with    Cough/URI     Stated Complaint: cough ,runny nose, sore throat  Subjective:   HPI    This is a well-appearing 74-year-old female with a history of GERD, asthma, hyperlipidemia, hypertension, diabetes, PE who presents with a chief complaint of cough, congestion and chills for the last few days..  Denies having any fever.  Also reports body aches.  States chills worse at night.  Denies any shortness of breath or chest pain.  Denies any headache or dizziness.  Her  currently admitted into the hospital with pneumonia.    Objective:   Past Medical History:    Arrhythmia    Asthma (HCC)    Chest pain    Diabetes (HCC)    Diverticulosis    Esophageal reflux    Extrinsic asthma, unspecified    High cholesterol    History of blood transfusion    no reaction    Pneumonia due to organism    Polyp of colon    None in . Repeat CLN in  if patient interested in screening/remains in good health    Pulmonary embolism (HCC)    Radial polydactyly    Left thumb mass    Sleep apnea    Visual impairment    wear glasses            Past Surgical History:   Procedure Laterality Date    Adj tiss xfer head,fac,hand <10sqcm Left 2024    Excision of left thumb mass, rotation flap reconstruction    Cardiac catheterization      Cholecystectomy      Colonoscopy      Colonoscopy N/A 2017    Procedure: COLONOSCOPY;  Surgeon: RUPERT Infante MD;  Location: [facility] ENDOSCOPY    Colonoscopy N/A 2020    Procedure: COLONOSCOPY;  Surgeon: RUPERT Infante MD;  Location: [facility] ENDOSCOPY    Hysterectomy                Social History     Socioeconomic History    Marital status:    Tobacco Use    Smoking status: Former     Current packs/day: 0.00     Types: Cigarettes     Start date: 1950     Quit date: 1980     Years since quittin.0    Smokeless tobacco: Never   Vaping Use    Vaping status: Never Used    Substance and Sexual Activity    Alcohol use: Not Currently     Alcohol/week: 1.0 standard drink of alcohol     Types: 1 Shots of liquor per week     Comment: \"socially\"    Drug use: No   Other Topics Concern    Right Handed Yes     Social Drivers of Health      [facility]    Social Connections    [facility]    Housing Stability            Review of Systems   All other systems reviewed and are negative.      Positive for stated complaint: Cough/URI    Other systems are as noted in HPI.  Constitutional and vital signs reviewed.      All other systems reviewed and negative except as noted above.    Physical Exam     ED Triage Vitals [12/30/24 0959]   /60   Pulse 80   Resp 20   Temp 98 °F (36.7 °C)   Temp src Oral   SpO2 98 %   O2 Device      Current:/60   Pulse 80   Temp 98 °F (36.7 °C) (Oral)   Resp 20   SpO2 98%     Physical Exam  Vitals and nursing note reviewed.   Constitutional:       General: She is awake. She is not in acute distress.     Appearance: Normal appearance. She is not ill-appearing, toxic-appearing or diaphoretic.   HENT:      Head: Normocephalic and atraumatic.      Right Ear: Tympanic membrane, ear canal and external ear normal.      Left Ear: Tympanic membrane, ear canal and external ear normal.      Nose: Rhinorrhea present.      Mouth/Throat:      Mouth: Mucous membranes are moist.      Pharynx: Oropharynx is clear. Uvula midline.   Eyes:      General: Lids are normal.      Extraocular Movements: Extraocular movements intact.      Conjunctiva/sclera: Conjunctivae normal.      Pupils: Pupils are equal, round, and reactive to light.   Cardiovascular:      Rate and Rhythm: Normal rate and regular rhythm.      Pulses: Normal pulses.      Heart sounds: Normal heart sounds.   Pulmonary:      Effort: Pulmonary effort is normal.      Breath sounds: Normal  breath sounds and air entry. No stridor, decreased air movement or transmitted upper airway sounds.   Skin:     General: Skin is warm and dry.      Capillary Refill: Capillary refill takes less than 2 seconds.   Neurological:      General: No focal deficit present.      Mental Status: She is alert and oriented to person, place, and time.   Psychiatric:         Mood and Affect: Mood normal.         Behavior: Behavior normal. Behavior is cooperative.         Thought Content: Thought content normal.         Judgment: Judgment normal.       ED Course   Influenza A positive  , No acute airspace disease.  XR CHEST PA + LAT CHEST (CPT=71046)    Result Date: 12/30/2024  CONCLUSION:  1. No acute airspace disease.  Minimal bibasilar scarring/atelectasis.    Dictated by (CST): Quinn Beltran MD on 12/30/2024 at 10:48 AM     Finalized by (CST): Quinn Beltran MD on 12/30/2024 at 10:49 AM          XR CHEST PA + LAT CHEST (CPT=71046)    Result Date: 12/30/2024  CONCLUSION:  1. No acute airspace disease.  Minimal bibasilar scarring/atelectasis.    Dictated by (CST): Quinn Beltran MD on 12/30/2024 at 10:48 AM     Finalized by (CST): Quinn Beltran MD on 12/30/2024 at 10:49 AM         Labs Reviewed   POCT FLU TEST - Abnormal; Notable for the following components:       Result Value    POCT INFLUENZA A Positive (*)     All other components within normal limits    Narrative:     This assay is a rapid molecular in vitro test utilizing nucleic acid amplification of influenza A and B viral RNA.       MDM     Medical Decision Making  Differential diagnoses reflecting the complexity of care include but are not limited to pneumonia, influenza, COVID-19.    Comorbidities that add complexity to management include: Diabetes, hypertension, hyperlipidemia, PE  History obtained by an independent source was from: N/A  Discussions of management was done with: N/A  My independent interpretations of studies include: Influenza A positive.  CXR  reviewed, unremarkable.   Shared decision making was done by: Patient and Myself   Patient is well appearing, non-toxic and in no acute distress.  Vital signs are stable.  Patient now mentions that her grandson tested positive for influenza A recently.  Patient states she has been caring for him.  The patient the chest x-ray findings here are unremarkable.  Discussed based on her history I do recommend Tamiflu.  She agrees.  She is not hypoxic or tachycardic, in no distress.  Strict ER precautions given.  Patient verbalized plan of care and states understanding    Problems Addressed:  Influenza: acute illness or injury    Amount and/or Complexity of Data Reviewed  Radiology: ordered and independent interpretation performed. Decision-making details documented in ED Course.  ECG/medicine tests: ordered and independent interpretation performed. Decision-making details documented in ED Course.    Risk  OTC drugs.  Prescription drug management.        Disposition and Plan     Clinical Impression:  1. Influenza         Disposition:  Discharge  12/30/2024 10:55 am    Follow-up:  Weiler, Colleen M, DO  [address] [place] [address]  [phone]                Medications Prescribed:  Discharge Medication List as of 12/30/2024 10:59 AM        START taking these medications    Details   oseltamivir (TAMIFLU) 75 MG Oral Cap Take 1 capsule (75 mg total) by mouth 2 (two) times daily for 5 days., Normal, Disp-10 capsule, R-0                Note to patient: The 21st Century cares act makes medical notes like these available to patients in the interest of transparency.  However, be advised this medical document and is intended as peer to peer communication.  It is read the medical language and may contain abbreviations or verbiage that are unfamiliar.  It may appear blunt or direct.  Medical documents are intended to carry relevant information, fax is evident and the clinical opinion of the practitioner.    This  note was prepared using Dragon Medical voice recognition dictation software.  As a result, errors may occur.  When identified, these errors have been corrected.  While every attempt is made to correct errors during dictation, discrepancies may still exist.    NIKKIE Polo  12/30/2024  10:20 AM

## 2024-12-30 NOTE — DISCHARGE INSTRUCTIONS
Your lungs are clear, there is no evidence of pneumonia on x-ray.  Close follow-up with your primary care provider is recommended.  You may take Tylenol as needed for fever.  Close follow-up with your primary care provider is recommended.  Any worsening symptoms please go to the ER.

## 2024-12-30 NOTE — ED INITIAL ASSESSMENT (HPI)
Pt presents with cough, congestion, chills x 5 days.     No medication taken today for symptoms.

## 2025-01-06 ENCOUNTER — OFFICE VISIT (OUTPATIENT)
Dept: ENDOCRINOLOGY CLINIC | Facility: CLINIC | Age: 75
End: 2025-01-06
Payer: MEDICARE

## 2025-01-06 VITALS
HEIGHT: 64 IN | SYSTOLIC BLOOD PRESSURE: 147 MMHG | WEIGHT: 206 LBS | DIASTOLIC BLOOD PRESSURE: 82 MMHG | BODY MASS INDEX: 35.17 KG/M2 | HEART RATE: 80 BPM

## 2025-01-06 DIAGNOSIS — E11.9 TYPE 2 DIABETES MELLITUS WITHOUT RETINOPATHY (HCC): Primary | ICD-10-CM

## 2025-01-06 LAB
GLUCOSE BLOOD: 117
HEMOGLOBIN A1C: 7.1 % (ref 4.3–5.6)
TEST STRIP LOT #: NORMAL NUMERIC

## 2025-01-06 PROCEDURE — 3008F BODY MASS INDEX DOCD: CPT

## 2025-01-06 PROCEDURE — 1159F MED LIST DOCD IN RCRD: CPT

## 2025-01-06 PROCEDURE — 82947 ASSAY GLUCOSE BLOOD QUANT: CPT

## 2025-01-06 PROCEDURE — 3079F DIAST BP 80-89 MM HG: CPT

## 2025-01-06 PROCEDURE — 3051F HG A1C>EQUAL 7.0%<8.0%: CPT

## 2025-01-06 PROCEDURE — 3077F SYST BP >= 140 MM HG: CPT

## 2025-01-06 PROCEDURE — 99215 OFFICE O/P EST HI 40 MIN: CPT

## 2025-01-06 PROCEDURE — 1160F RVW MEDS BY RX/DR IN RCRD: CPT

## 2025-01-06 PROCEDURE — 83036 HEMOGLOBIN GLYCOSYLATED A1C: CPT

## 2025-01-06 RX ORDER — TIRZEPATIDE 2.5 MG/.5ML
2.5 INJECTION, SOLUTION SUBCUTANEOUS WEEKLY
Qty: 2 ML | Refills: 0 | Status: SHIPPED | OUTPATIENT
Start: 2025-01-06

## 2025-01-06 RX ORDER — TIRZEPATIDE 5 MG/.5ML
5 INJECTION, SOLUTION SUBCUTANEOUS WEEKLY
Qty: 2 ML | Refills: 5 | Status: SHIPPED | OUTPATIENT
Start: 2025-01-06

## 2025-01-06 NOTE — PATIENT INSTRUCTIONS
Start Mounjaro 2.5 mg subcutaneous weekly x 4 weeks and then increase to 5 mg subcutaneous weekly

## 2025-01-06 NOTE — PROGRESS NOTES
Name: Silvana Morales  Date: 1/6/2025    Referring Physician: No ref. provider found    HISTORY OF PRESENT ILLNESS   Silvana Morales is a 74 year old female who presents for consult for diabetes       Diabetes History:  Diagnosed - about 5 years ago   Patient has not had hospitalizations for blood sugar issues    Prior glycohemoglobin were:  7.0% 7/2024; 7.1% POC today   Glucose in clinic today - 117 mg/dl     Dietary compliance: Fairly high in CHO     Recall:  Breakfast- oatmeal or cornflakes with coffee   Lunch- leftovers from dinner- fish with fries with vegetable  Dinner- fish with fries or chicken wings with fries with vegetable   Snack- cheese puffs   Beverages- flavored water, water, or coffee in the morning     Exercise: Yes- treadmill at home. 10-15 minutes daily   Polyuria/polydipsia: No  Blurred vision: No    Episodes of hypoglycemia: No  Blood Glucose:  Checking 1 times per day    Evening- 118-170's     Current DM Regimen:  Metformin 500 mg PO BID- at times will take third tablet in the evening.     Modifying factors:  Medication adherence: Yes   Recent steroids, illness or infections: Influenza within the last month       REVIEW OF SYSTEMS  Eyes: Diabetic retinopathy present: No, cataracts- mild             Most recent visit to eye doctor in last 12 months: Yes- 1/2024- [facility]- [facility]     CV: Cardiovascular disease present: No         Hypertension present: No         Hyperlipidemia present: No         Peripheral Vascular Disease present: No    : Nephropathy present: No    Neuro: Neuropathy present: No    Skin: Infection or ulceration: No    Osteoporosis: No    Thyroid disease: No      Medications:     Current Outpatient Medications:     Tirzepatide (MOUNJARO) 2.5 MG/0.5ML Subcutaneous Solution Auto-injector, Inject 2.5 mg into the skin once a week., Disp: 2 mL, Rfl: 0    Tirzepatide (MOUNJARO) 5 MG/0.5ML Subcutaneous Solution Auto-injector, Inject 5 mg into the skin once a week., Disp: 2  mL, Rfl: 5    IBUPROFEN OR, Take 1 tablet by mouth daily as needed., Disp: , Rfl:     MELATONIN OR, Take 1 tablet by mouth at bedtime., Disp: , Rfl:     metoprolol succinate ER 25 MG Oral Tablet 24 Hr, Take 1 tablet (25 mg total) by mouth daily., Disp: , Rfl:     fluticasone-salmeterol 250-50 MCG/ACT Inhalation Aerosol Powder, Breath Activated, INHALE ONE PUFF BY MOUTH EVERY 12 HOURS, Disp: 60 each, Rfl: 3    metFORMIN 500 MG Oral Tab, Take 1 tablet (500 mg total) by mouth 2 (two) times daily with meals., Disp: 180 tablet, Rfl: 3    albuterol 108 (90 Base) MCG/ACT Inhalation Aero Soln, Inhale 2 puffs into the lungs every 4 (four) hours as needed for Wheezing., Disp: 25.5 g, Rfl: 3    pravastatin 10 MG Oral Tab, Take 1 tablet (10 mg total) by mouth nightly., Disp: 90 tablet, Rfl: 3    Accu-Chek Softclix Lancets Does not apply Misc, 1 Lancet by Finger stick route 2 (two) times daily., Disp: 200 each, Rfl: 3    montelukast 10 MG Oral Tab, Take 1 tablet (10 mg total) by mouth nightly. At Bedtime, Disp: 90 tablet, Rfl: 3    Glucose Blood (ACCU-CHEK LORRAINE PLUS) In Vitro Strip, Check blood sugar twice daily, Disp: 200 strip, Rfl: 3    Accu-Chek Softclix Lancets Does not apply Misc, Check blood glucose once daily., Disp: 100 each, Rfl: 1    omeprazole 20 MG Oral Capsule Delayed Release, Take 1 capsule (20 mg total) by mouth every morning., Disp: 90 capsule, Rfl: 3    fluticasone propionate (FLONASE) 50 MCG/ACT Nasal Suspension, 2 sprays by Nasal route daily., Disp: 3 each, Rfl: 3    Spacer/Aero-Holding Chambers (COMPACT SPACE CHAMBER) Does not apply Device, TO BE USED WITH ALBUTEROL, INHALE 2 PUFFS EVERY 4 HOURS AS NEEDED, Disp: , Rfl:     meclizine 25 MG Oral Tab, Take 1 tablet (25 mg total) by mouth 2 (two) times daily as needed., Disp: 60 tablet, Rfl: 0    Glucose Blood In Vitro Strip, place 1 by Misc.(Non-Drug; Combo Route) route 3 times every day with glucose monitor, Disp: 100 strip, Rfl: 3    Blood Glucose Monitoring  Suppl (BLOOD GLUCOSE MONITOR SYSTEM) w/Device Does not apply Kit, One Touch machine. Please check accucheck once daily, Disp: 1 kit, Rfl: 0    aspirin 81 MG Oral Tab, Take 1 tablet (81 mg total) by mouth daily., Disp: , Rfl:     Cyanocobalamin (VITAMIN B-12 CR OR), Take  by mouth., Disp: , Rfl:     Multiple Vitamins-Minerals (CENTRUM SILVER) Oral Tab, Take  by mouth. Take one tablet by mouth daily, Disp: , Rfl:     HYDROcodone-acetaminophen 7.5-325 MG Oral Tab, Take 1 tablet by mouth every 4 to 6 hours as needed for Pain. (Patient not taking: Reported on 2025), Disp: 10 tablet, Rfl: 0     Allergies:   Allergies[1]    Social History:   Social History     Socioeconomic History    Marital status:    Tobacco Use    Smoking status: Former     Current packs/day: 0.00     Types: Cigarettes     Start date: 1950     Quit date: 1980     Years since quittin.0    Smokeless tobacco: Never   Vaping Use    Vaping status: Never Used   Substance and Sexual Activity    Alcohol use: Not Currently     Alcohol/week: 1.0 standard drink of alcohol     Types: 1 Shots of liquor per week     Comment: \"socially\"    Drug use: No   Other Topics Concern    Right Handed Yes       Medical History:   Past Medical History:    Arrhythmia    Asthma (HCC)    Chest pain    Diabetes (HCC)    Diverticulosis    Esophageal reflux    Extrinsic asthma, unspecified    High cholesterol    History of blood transfusion    no reaction    Pneumonia due to organism    Polyp of colon    None in . Repeat CLN in  if patient interested in screening/remains in good health    Pulmonary embolism (HCC)    Radial polydactyly    Left thumb mass    Sleep apnea    Visual impairment    wear glasses       Surgical history:   Past Surgical History:   Procedure Laterality Date    Adj tiss xfer head,fac,hand <10sqcm Left 2024    Excision of left thumb mass, rotation flap reconstruction    Cardiac catheterization      Cholecystectomy       Colonoscopy      Colonoscopy N/A 07/25/2017    Procedure: COLONOSCOPY;  Surgeon: RUPERT Infante MD;  Location: [facility] ENDOSCOPY    Colonoscopy N/A 09/23/2020    Procedure: COLONOSCOPY;  Surgeon: RUPERT Infante MD;  Location: [facility] ENDOSCOPY    Hysterectomy           PHYSICAL EXAM  Vitals:    01/06/25 1353   BP: 147/82   Pulse: 80   Weight: 206 lb (93.4 kg)   Height: 5' 4\" (1.626 m)       General Appearance:  alert, well developed, in no acute distress  Eyes:  normal conjunctivae, sclera, and normal pupils  Neck: Trachea midline: Normal  Back: no kyphosis or back tenderness  Musculoskeletal:  normal muscle strength and tone  Skin:  normal moisture and skin texture  Hair & Nails:  normal scalp hair     Hematologic:  no excessive bruising  Neuro:  sensory grossly intact and motor grossly intact.  Psychiatric:  oriented to time, self, and place  Nutritional:  no abnormal weight gain or loss  Feet: Bilateral barefoot skin diabetic exam is normal, visualized feet and the appearance is normal.  Bilateral monofilament/sensation of both feet is normal.  Pulsation pedal pulse exam of both lower legs/feet is normal as well.      ASSESSMENT/PLAN:    Diabetes mellitus type 2   - HgA1c - 7.1% POC today   - Reviewed ABC's of diabetes   - Reviewed pathogenesis of diabetes.   - Reviewed importance of good glycemic control to prevent microvascular and macrovascular complications including nephropathy, neuropathy, retinopathy, and cardiovascular disease.  - Reviewed importance of SBGM- check glucose 1 time daily   - Reviewed target glucose goals for patient  fasting and <180 post prandially   - Reviewed importance of following diabetic diet- recommended 135 grams of CHO per day or 45 grams per meal.   - Provided patient education materials     - Start Mounjaro 2.5 mg subcutaneous weekly x 4 weeks and then increase to 5 mg subcutaneous weekly. Reviewed side effects and risks vs benefits of medications. Reviewed pen injection and  dose titration.     - Stop Mounjaro     - Bp elevated  but improved on repeat   - UTD with optho- has yearly visit this month   - normal foot exam   - normal lipids 7/2024  - No nephropathy- last lab 7/2024    RTC in 3 months   1/6/2025  NIKKIE Iniguez    A total of  40 minutes was spent on obtaining history, reviewing pertinent imaging/labs and specialists notes, evaluating patient, providing multiple treatment options, reinforcing diet/exercise and compliance, and completing documentation.                 [1]   Allergies  Allergen Reactions    Penicillins WHEEZING     No skin peeling, no blister formation, no organ damage

## 2025-01-07 ENCOUNTER — TELEPHONE (OUTPATIENT)
Dept: ENDOCRINOLOGY CLINIC | Facility: CLINIC | Age: 75
End: 2025-01-07

## 2025-01-07 NOTE — TELEPHONE ENCOUNTER
[facility]/pharm calling to request diagnosis codes to determine if prior authorization for Mounjaro.  Please call at [phone],thanks.

## 2025-01-07 NOTE — TELEPHONE ENCOUNTER
Patient called to speak with a nurse in regards to the Mounjaro that was sent over to the patient's pharmacy. Patient was informed by the pharmacy that the order was added in incorrectly and will need to be resent.         Tirzepatide (MOUNJARO) 2.5 MG/0.5ML Subcutaneous Solution Auto-injector, Inject 2.5 mg into the skin once a week., Disp: 2 mL, Rfl: 0

## 2025-01-08 NOTE — TELEPHONE ENCOUNTER
Returned A[facility]'s call at [phone] and spoke to representative, provided them with Dx at last office visit note, E11.9.

## 2025-01-24 ENCOUNTER — NURSE TRIAGE (OUTPATIENT)
Dept: FAMILY MEDICINE CLINIC | Facility: CLINIC | Age: 75
End: 2025-01-24

## 2025-01-24 RX ORDER — NYSTATIN 100000 U/G
1 CREAM TOPICAL
Qty: 30 G | Refills: 0 | Status: SHIPPED | OUTPATIENT
Start: 2025-01-24 | End: 2025-01-31

## 2025-01-24 NOTE — TELEPHONE ENCOUNTER
Action Requested: Summary for Provider     []  Critical Lab, Recommendations Needed  [x] Need Additional Advice  []   FYI    []   Need Orders  [x] Need Medications Sent to Pharmacy  []  Other     SUMMARY: Per protocol, patient should be able to manage symptoms with home care. Requesting refill of nystatin cream, last prescribed 9/3/24.    Dr. Ireland (for Dr. Weiler) please advise. Pended prescription for review and approval if appropriate.     Reason for call: Rash  Onset: 1/19    Patient reports she gets frequent skin irritation and has vitiligo. Complains of redness and itching under the breast area and the lower abdomen since Sunday. Skin not broken but \"inflamed.\" Denies fever, chills or pain. She has been applying hydrocortisone cream with no relief. States she was prescribed a cream late last year for this and is requesting refill. Care advice given per protocol. Patient verbalized understanding and agreed with plan of care.     Reason for Disposition   Mild localized rash    Protocols used: Rash or Redness - [id number]

## 2025-02-03 ENCOUNTER — TELEPHONE (OUTPATIENT)
Dept: ENDOCRINOLOGY CLINIC | Facility: CLINIC | Age: 75
End: 2025-02-03

## 2025-02-03 RX ORDER — TIRZEPATIDE 2.5 MG/.5ML
2.5 INJECTION, SOLUTION SUBCUTANEOUS WEEKLY
Qty: 6 ML | Refills: 0 | Status: SHIPPED | OUTPATIENT
Start: 2025-02-03

## 2025-02-03 NOTE — TELEPHONE ENCOUNTER
Patient states Mounjaro dose is incorrect - states she is currently on Mounkaro 5 and thought dose is suppose to increase.  Please call.  Thank you.

## 2025-02-03 NOTE — TELEPHONE ENCOUNTER
Endocrine Refill protocol for oral and injectable diabetic medications    Protocol Criteria:  PASSED  Reason: N/A    If all below requirements are met, send a 90-day supply with 1 refill per provider protocol.    Verify appointment with Endocrinology completed in the last 6 months or scheduled in the next 3 months.  Verify A1C has been completed within the last 6 months and is below 8.5%     Last completed office visit: 1/6/2025 Luz Baez APRN   Next scheduled Follow up:   Future Appointments   Date Time Provider Department Center   4/7/2025 12:30 PM Luz Baez APRN [facility]   5/16/2025 10:40 AM [facility]EXA RM1 [facility] DEXA [facility]      Last A1c result: Last A1c value was 7.1% done 1/6/2025.

## 2025-02-04 RX ORDER — TIRZEPATIDE 7.5 MG/.5ML
7.5 INJECTION, SOLUTION SUBCUTANEOUS WEEKLY
Qty: 2 ML | Refills: 2 | Status: SHIPPED | OUTPATIENT
Start: 2025-02-04 | End: 2025-04-07 | Stop reason: DRUGHIGH

## 2025-02-04 NOTE — TELEPHONE ENCOUNTER
Ok to increase to Mounjaro 7.5mg subcutaneous weekly after she completes the full month of 5mg dose. Prescription sent to pharmacy.

## 2025-02-04 NOTE — TELEPHONE ENCOUNTER
Per LOV     \"- Start Mounjaro 2.5 mg subcutaneous weekly x 4 weeks and then increase to 5 mg subcutaneous weekly. Reviewed side effects and risks vs benefits of medications. Reviewed pen injection and dose titration.      - Stop Mounjaro \"    Would you like to increase to 7.5?  Or would like us to call and review bg's before increase?

## 2025-02-12 ENCOUNTER — TELEPHONE (OUTPATIENT)
Dept: FAMILY MEDICINE CLINIC | Facility: CLINIC | Age: 75
End: 2025-02-12

## 2025-02-12 DIAGNOSIS — E11.9 TYPE 2 DIABETES MELLITUS WITHOUT RETINOPATHY (HCC): ICD-10-CM

## 2025-02-12 DIAGNOSIS — H40.9 GLAUCOMA, UNSPECIFIED GLAUCOMA TYPE, UNSPECIFIED LATERALITY: Primary | ICD-10-CM

## 2025-02-12 NOTE — TELEPHONE ENCOUNTER
Dr. Weiler,     Patient called requesting referral to Dr. Smith.     Pended referral please review diagnosis and sign off if you agree.    Thank you.  Vangie Fairbanks  [facility]

## 2025-02-12 NOTE — TELEPHONE ENCOUNTER
Patient called asking for a referral for a different eye Dr. She stated the Dr at [facility] will not see her right now and told her to callback at a later date and they will try again. Patient is looking to be referred to another provider.

## 2025-02-27 RX ORDER — OMEPRAZOLE 20 MG/1
20 CAPSULE, DELAYED RELEASE ORAL EVERY MORNING
Qty: 90 CAPSULE | Refills: 3 | Status: SHIPPED | OUTPATIENT
Start: 2025-02-27

## 2025-02-27 RX ORDER — LOSARTAN POTASSIUM 25 MG/1
25 TABLET ORAL DAILY
Qty: 90 TABLET | Refills: 3 | OUTPATIENT
Start: 2025-02-27

## 2025-03-05 RX ORDER — FLUTICASONE PROPIONATE AND SALMETEROL 250; 50 UG/1; UG/1
POWDER RESPIRATORY (INHALATION)
Qty: 180 EACH | Refills: 3 | Status: SHIPPED | OUTPATIENT
Start: 2025-03-05

## 2025-03-05 NOTE — TELEPHONE ENCOUNTER
Refill Per Protocol     Requested Prescriptions   Pending Prescriptions Disp Refills    FLUTICASONE-SALMETEROL 250-50 MCG/ACT Inhalation Aerosol Powder, Breath Activated [Pharmacy Med Name: Fluticasone Propionate/Salmeterol Diskus 250-50mcg/ Aer Pras]  0     Sig: INHALE ONE PUFF BY MOUTH EVERY 12 HOURS       Asthma & COPD Medication Protocol Passed - 3/5/2025 11:48 AM        Passed - Appointment in past 6 or next 3 months      Recent Outpatient Visits              1 month ago Type 2 diabetes mellitus without retinopathy (HCC)    [facility] Luz Baez APRN    Office Visit    2 months ago Visit for suture removal    [facility] [place]    Nurse Only    4 months ago Benign neoplasm of skin of upper limb, including shoulder, left    [facility] Dexter Garcia MD    Office Visit    4 months ago Abscess of left thumb    [facility] Weiler, Colleen M, DO    Office Visit    7 months ago Encounter for annual health examination    [facility] Weiler, Colleen M,     Office Visit          Future Appointments         Provider Department Appt Notes    In 1 month Luz Baez APRN [facility]     In 2 months [facility] DEXA [address] D[facility]A [facility] My lower back and psychotic nerve    In 3 months Weiler, Colleen M, DO [facility] annual phx; last phx 7/10/2024                    Passed - Medication is active on med list

## 2025-04-07 ENCOUNTER — TELEPHONE (OUTPATIENT)
Dept: ENDOCRINOLOGY CLINIC | Facility: CLINIC | Age: 75
End: 2025-04-07

## 2025-04-07 ENCOUNTER — OFFICE VISIT (OUTPATIENT)
Dept: ENDOCRINOLOGY CLINIC | Facility: CLINIC | Age: 75
End: 2025-04-07
Payer: MEDICARE

## 2025-04-07 VITALS
WEIGHT: 186 LBS | HEIGHT: 64 IN | SYSTOLIC BLOOD PRESSURE: 120 MMHG | HEART RATE: 75 BPM | DIASTOLIC BLOOD PRESSURE: 78 MMHG | BODY MASS INDEX: 31.76 KG/M2

## 2025-04-07 DIAGNOSIS — E11.9 TYPE 2 DIABETES MELLITUS WITHOUT RETINOPATHY (HCC): Primary | ICD-10-CM

## 2025-04-07 LAB
GLUCOSE BLOOD: 132
HEMOGLOBIN A1C: 5.7 % (ref 4.3–5.6)
TEST STRIP LOT #: NORMAL NUMERIC

## 2025-04-07 PROCEDURE — 1159F MED LIST DOCD IN RCRD: CPT

## 2025-04-07 PROCEDURE — 3078F DIAST BP <80 MM HG: CPT

## 2025-04-07 PROCEDURE — 3074F SYST BP LT 130 MM HG: CPT

## 2025-04-07 PROCEDURE — 3044F HG A1C LEVEL LT 7.0%: CPT

## 2025-04-07 PROCEDURE — 99214 OFFICE O/P EST MOD 30 MIN: CPT

## 2025-04-07 PROCEDURE — 3008F BODY MASS INDEX DOCD: CPT

## 2025-04-07 PROCEDURE — 82947 ASSAY GLUCOSE BLOOD QUANT: CPT

## 2025-04-07 PROCEDURE — 83036 HEMOGLOBIN GLYCOSYLATED A1C: CPT

## 2025-04-07 PROCEDURE — 1160F RVW MEDS BY RX/DR IN RCRD: CPT

## 2025-04-07 RX ORDER — TIRZEPATIDE 10 MG/.5ML
10 INJECTION, SOLUTION SUBCUTANEOUS WEEKLY
Qty: 6 ML | Refills: 1 | Status: SHIPPED | OUTPATIENT
Start: 2025-04-07 | End: 2025-04-08

## 2025-04-07 NOTE — PROGRESS NOTES
Name: Silvana Morales  Date: 4/7/25    Referring Physician: No ref. provider found    HISTORY OF PRESENT ILLNESS   Silvana Morales is a 74 year old female who presents for follow up for diabetes       Diabetes History:  Diagnosed - about 5 years ago   Patient has not had hospitalizations for blood sugar issues    Prior glycohemoglobin were:  7.0% 7/2024; 7.1% 1/2025; 5.7% POC today     Dietary compliance: Fairly high in CHO. Following weight watchers      Recall:  Breakfast- oatmeal or cornflakes with coffee   Lunch- leftovers from dinner- fish with fries with vegetable  Dinner- fish with fries or chicken wings with fries with vegetable   Snack- cheese puffs   Beverages- flavored water, water, or coffee in the morning     Exercise: Yes- treadmill at home. 20 minutes daily.     Polyuria/polydipsia: No  Blurred vision: No    Episodes of hypoglycemia: No  Blood Glucose:  Checking 1 times per day    Averaging- 's     Current DM Regimen:  Mounjaro 7.5 mg subcutaneous weekly     Modifying factors:  Medication adherence: Yes   Recent steroids, illness or infections: None       REVIEW OF SYSTEMS  Eyes: Diabetic retinopathy present: No, cataracts- mild             Most recent visit to eye doctor in last 12 months: No 1/2024; had appt 1/2025 but it was cancelled. She will reschedule.     CV: Cardiovascular disease present: No         Hypertension present: No         Hyperlipidemia present: No         Peripheral Vascular Disease present: No    : Nephropathy present: No    Neuro: Neuropathy present: No    Skin: Infection or ulceration: No    Osteoporosis: No    Thyroid disease: No      Medications:     Current Outpatient Medications:     Tirzepatide (MOUNJARO) 10 MG/0.5ML Subcutaneous Solution Auto-injector, Inject 10 mg into the skin once a week., Disp: 6 mL, Rfl: 1    fluticasone-salmeterol 250-50 MCG/ACT Inhalation Aerosol Powder, Breath Activated, INHALE ONE PUFF BY MOUTH EVERY 12 HOURS, Disp: 180 each, Rfl: 3     omeprazole 20 MG Oral Capsule Delayed Release, Take 1 capsule (20 mg total) by mouth every morning., Disp: 90 capsule, Rfl: 3    IBUPROFEN OR, Take 1 tablet by mouth daily as needed., Disp: , Rfl:     MELATONIN OR, Take 1 tablet by mouth at bedtime., Disp: , Rfl:     metoprolol succinate ER 25 MG Oral Tablet 24 Hr, Take 1 tablet (25 mg total) by mouth daily., Disp: , Rfl:     albuterol 108 (90 Base) MCG/ACT Inhalation Aero Soln, Inhale 2 puffs into the lungs every 4 (four) hours as needed for Wheezing., Disp: 25.5 g, Rfl: 3    pravastatin 10 MG Oral Tab, Take 1 tablet (10 mg total) by mouth nightly., Disp: 90 tablet, Rfl: 3    Accu-Chek Softclix Lancets Does not apply Misc, 1 Lancet by Finger stick route 2 (two) times daily., Disp: 200 each, Rfl: 3    montelukast 10 MG Oral Tab, Take 1 tablet (10 mg total) by mouth nightly. At Bedtime, Disp: 90 tablet, Rfl: 3    Glucose Blood (ACCU-CHEK LORRAINE PLUS) In Vitro Strip, Check blood sugar twice daily, Disp: 200 strip, Rfl: 3    Accu-Chek Softclix Lancets Does not apply Misc, Check blood glucose once daily., Disp: 100 each, Rfl: 1    fluticasone propionate (FLONASE) 50 MCG/ACT Nasal Suspension, 2 sprays by Nasal route daily., Disp: 3 each, Rfl: 3    Spacer/Aero-Holding Chambers (COMPACT SPACE CHAMBER) Does not apply Device, TO BE USED WITH ALBUTEROL, INHALE 2 PUFFS EVERY 4 HOURS AS NEEDED, Disp: , Rfl:     meclizine 25 MG Oral Tab, Take 1 tablet (25 mg total) by mouth 2 (two) times daily as needed., Disp: 60 tablet, Rfl: 0    Glucose Blood In Vitro Strip, place 1 by Misc.(Non-Drug; Combo Route) route 3 times every day with glucose monitor, Disp: 100 strip, Rfl: 3    Blood Glucose Monitoring Suppl (BLOOD GLUCOSE MONITOR SYSTEM) w/Device Does not apply Kit, One Touch machine. Please check accucheck once daily, Disp: 1 kit, Rfl: 0    aspirin 81 MG Oral Tab, Take 1 tablet (81 mg total) by mouth daily., Disp: , Rfl:     Cyanocobalamin (VITAMIN B-12 CR OR), Take  by mouth., Disp:  , Rfl:     Multiple Vitamins-Minerals (CENTRUM SILVER) Oral Tab, Take  by mouth. Take one tablet by mouth daily, Disp: , Rfl:     metFORMIN 500 MG Oral Tab, Take 1 tablet (500 mg total) by mouth 2 (two) times daily with meals. (Patient not taking: Reported on 2025), Disp: 180 tablet, Rfl: 3    HYDROcodone-acetaminophen 7.5-325 MG Oral Tab, Take 1 tablet by mouth every 4 to 6 hours as needed for Pain. (Patient not taking: Reported on 2024), Disp: 10 tablet, Rfl: 0     Allergies:   Allergies[1]    Social History:   Social History     Socioeconomic History    Marital status:    Tobacco Use    Smoking status: Former     Current packs/day: 0.00     Types: Cigarettes     Start date: 1950     Quit date: 1980     Years since quittin.2    Smokeless tobacco: Never   Vaping Use    Vaping status: Never Used   Substance and Sexual Activity    Alcohol use: Not Currently     Alcohol/week: 1.0 standard drink of alcohol     Types: 1 Shots of liquor per week     Comment: \"socially\"    Drug use: No   Other Topics Concern    Right Handed Yes       Medical History:   Past Medical History:    Arrhythmia    Asthma (HCC)    Chest pain    Diabetes (HCC)    Diverticulosis    Esophageal reflux    Extrinsic asthma, unspecified    High cholesterol    History of blood transfusion    no reaction    Pneumonia due to organism    Polyp of colon    None in . Repeat CLN in  if patient interested in screening/remains in good health    Pulmonary embolism (HCC)    Radial polydactyly    Left thumb mass    Sleep apnea    Visual impairment    wear glasses       Surgical history:   Past Surgical History:   Procedure Laterality Date    Adj tiss xfer head,fac,hand <10sqcm Left 2024    Excision of left thumb mass, rotation flap reconstruction    Cardiac catheterization      Cholecystectomy      Colonoscopy      Colonoscopy N/A 2017    Procedure: COLONOSCOPY;  Surgeon: RUPERT Infante MD;  Location: [facility]  ENDOSCOPY    Colonoscopy N/A 09/23/2020    Procedure: COLONOSCOPY;  Surgeon: RUPERT Infante MD;  Location: [facility] ENDOSCOPY    Hysterectomy           PHYSICAL EXAM  Vitals:    04/07/25 1211   BP: 120/78   Pulse: 75   Weight: 186 lb (84.4 kg)   Height: 5' 4\" (1.626 m)         General Appearance:  alert, well developed, in no acute distress  Eyes:  normal conjunctivae, sclera, and normal pupils  Neck: Trachea midline: Normal  Back: no kyphosis or back tenderness  Musculoskeletal:  normal muscle strength and tone  Skin:  normal moisture and skin texture  Hair & Nails:  normal scalp hair     Hematologic:  no excessive bruising  Neuro:  sensory grossly intact and motor grossly intact.  Psychiatric:  oriented to time, self, and place  Nutritional:  no abnormal weight gain or loss  Feet: Bilateral barefoot skin diabetic exam is normal, visualized feet and the appearance is normal.  Bilateral monofilament/sensation of both feet is normal.  Pulsation pedal pulse exam of both lower legs/feet is normal as well.      ASSESSMENT/PLAN:    Diabetes mellitus type 2   - HgA1c - 5.7% POC today   - Congratulated patient on improved glycemic control   - Reviewed ABC's of diabetes   - Reviewed pathogenesis of diabetes.   - Reviewed importance of good glycemic control to prevent microvascular and macrovascular complications including nephropathy, neuropathy, retinopathy, and cardiovascular disease.  - Reviewed importance of SBGM- check glucose 1 time daily   - Reviewed target glucose goals for patient  fasting and <180 post prandially   - Reviewed importance of following diabetic diet- recommended 135 grams of CHO per day or 45 grams per meal.   - Provided patient education materials     - Increase Mounjaro to 10 mg subcutaneous once weekly. Reviewed side effects and risks vs benefits of medication.     - normotensive   - UTD with optho- reminded her to reschedule follow up appt   - normal foot exam   - normal lipids 7/2024  - No  nephropathy- last lab 7/2024    RTC in 4 months   4/7/25  NIKKIE Iniguez    A total of  30 minutes was spent on obtaining history, reviewing pertinent imaging/labs and specialists notes, evaluating patient, providing multiple treatment options, reinforcing diet/exercise and compliance, and completing documentation.                 [1]   Allergies  Allergen Reactions    Penicillins WHEEZING     No skin peeling, no blister formation, no organ damage

## 2025-04-07 NOTE — TELEPHONE ENCOUNTER
[facility] calling regarding prescription for Mounjaro, as it needs to have a diagnosis on the prescription, in order to be able to bill Medicare Insurance.

## 2025-04-08 RX ORDER — TIRZEPATIDE 10 MG/.5ML
10 INJECTION, SOLUTION SUBCUTANEOUS WEEKLY
Qty: 6 ML | Refills: 1 | Status: SHIPPED | OUTPATIENT
Start: 2025-04-08

## 2025-05-01 RX ORDER — FLUTICASONE PROPIONATE 50 MCG
2 SPRAY, SUSPENSION (ML) NASAL DAILY
Qty: 3 EACH | Refills: 3 | Status: SHIPPED | OUTPATIENT
Start: 2025-05-01

## 2025-05-01 NOTE — TELEPHONE ENCOUNTER
Refill passed per [facility] protocol.    Requested Prescriptions   Pending Prescriptions Disp Refills    fluticasone propionate (FLONASE) 50 MCG/ACT Nasal Suspension 3 each 3     Si sprays by Nasal route daily.       Allergy Medication Protocol Passed - 2025  2:09 PM        Passed - In person appointment or virtual visit in the past 12 mos or appointment in next 3 mos     Recent Outpatient Visits              3 weeks ago Type 2 diabetes mellitus without retinopathy (HCC)    [facility] Luz Baez APRN    Office Visit    3 months ago Type 2 diabetes mellitus without retinopathy (HCC)    [facility] Luz Baez APRN    Office Visit    4 months ago Visit for suture removal    [facility]    Nurse Only    5 months ago Benign neoplasm of skin of upper limb, including shoulder, left    [facility] Dexter Garcia MD    Office Visit    6 months ago Abscess of left thumb    [facility] Weiler, Colleen M,     Office Visit          Future Appointments         Provider Department Appt Notes    In 2 weeks [facility] DEXA 1 [facility] My lower back and psychotic nerve    In 2 months Weiler, Colleen M, DO [facility] annual phx; last phx 7/10/2024    In 3 months Luz Baez APRN [facility]                     Passed - Medication is active on med list           Future Appointments         Provider Department Appt Notes    In 2 weeks [facility] DEXA RM1 [facility] DEXA - [facility] My lower back and psychotic nerve    In 2 months Weiler, Colleen M,  [facility] annual phx; last phx 7/10/2024    In 3 months  Luz Baez APRN [facility]           Recent Outpatient Visits              3 weeks ago Type 2 diabetes mellitus without retinopathy (HCC)    [facility] Luz Baez APRN    Office Visit    3 months ago Type 2 diabetes mellitus without retinopathy (HCC)    [facility] Luz Baez APRN    Office Visit    4 months ago Visit for suture removal    [facility]    Nurse Only    5 months ago Benign neoplasm of skin of upper limb, including shoulder, left    [facility] Dexter Garcia MD    Office Visit    6 months ago Abscess of left thumb    [facility] Weiler, Colleen M, DO    Office Visit

## 2025-05-27 RX ORDER — MONTELUKAST SODIUM 10 MG/1
10 TABLET ORAL NIGHTLY
Qty: 90 TABLET | Refills: 3 | Status: SHIPPED | OUTPATIENT
Start: 2025-05-27

## 2025-05-27 NOTE — TELEPHONE ENCOUNTER
Refill Per Protocol     Requested Prescriptions   Pending Prescriptions Disp Refills    MONTELUKAST 10 MG Oral Tab [Pharmacy Med Name: Montelukast Sodium 10 Mg Tab Auro] 90 tablet 0     Sig: Take 1 tablet (10 mg total) by mouth nightly. At Bedtime       Asthma & COPD Medication Protocol Passed - 5/27/2025  3:32 PM        Passed - Appointment in past 6 or next 3 months      Recent Outpatient Visits              1 month ago Type 2 diabetes mellitus without retinopathy (HCC)    [facility] Luz Baez APRN    Office Visit    4 months ago Type 2 diabetes mellitus without retinopathy (HCC)    [facility] Luz Baez APRN    Office Visit    5 months ago Visit for suture removal    [facility]    Nurse Only    6 months ago Benign neoplasm of skin of upper limb, including shoulder, left    [facility] Dexter Garcia MD    Office Visit    6 months ago Abscess of left thumb    [facility] Weiler, Colleen M, DO    Office Visit          Future Appointments         Provider Department Appt Notes    In 3 weeks [facility] DEXA RM1 [facility] DEXA - [facility] Back    In 1 month Weiler, Colleen M, DO [facility] annual phx; last phx 7/10/2024    In 2 months Luz Baez APRN [facility] Diabetes Follow Up                    Passed - Medication is active on med list

## 2025-06-03 RX ORDER — NYSTATIN 100000 U/G
1 CREAM TOPICAL 2 TIMES DAILY
Qty: 30 G | Refills: 3 | Status: SHIPPED | OUTPATIENT
Start: 2025-06-03

## 2025-06-03 NOTE — TELEPHONE ENCOUNTER
Patient states she has a skin condition vitiligo and from time to time will need to put nystatin cream on. Patient has rash under her breasts and abdominal fold. Medication pended.

## 2025-06-23 RX ORDER — BLOOD-GLUCOSE METER
1 EACH MISCELLANEOUS AS DIRECTED
Qty: 1 KIT | Refills: 0 | Status: SHIPPED | OUTPATIENT
Start: 2025-06-23

## 2025-06-23 RX ORDER — BLOOD SUGAR DIAGNOSTIC
1 STRIP MISCELLANEOUS 2 TIMES DAILY
Qty: 200 STRIP | Refills: 3 | Status: SHIPPED | OUTPATIENT
Start: 2025-06-23

## 2025-06-23 NOTE — TELEPHONE ENCOUNTER
Medications - Current[1]  Glucose Blood (ACCU-CHEK LORRAINE PLUS) In Vitro Strip, Check blood sugar twice daily, Disp: 200 strip, Rfl: 3        [1]   Current Outpatient Medications:     Tirzepatide (MOUNJARO) 10 MG/0.5ML Subcutaneous Solution Auto-injector, Inject 10 mg into the skin once a week., Disp: 6 mL, Rfl: 1    nystatin 100,000 Units/g External Cream, Apply 1 Application  topically 2 (two) times daily., Disp: 30 g, Rfl: 3    montelukast 10 MG Oral Tab, Take 1 tablet (10 mg total) by mouth nightly. At Bedtime, Disp: 90 tablet, Rfl: 3    fluticasone propionate (FLONASE) 50 MCG/ACT Nasal Suspension, 2 sprays by Nasal route daily., Disp: 3 each, Rfl: 3    fluticasone-salmeterol 250-50 MCG/ACT Inhalation Aerosol Powder, Breath Activated, INHALE ONE PUFF BY MOUTH EVERY 12 HOURS, Disp: 180 each, Rfl: 3    omeprazole 20 MG Oral Capsule Delayed Release, Take 1 capsule (20 mg total) by mouth every morning., Disp: 90 capsule, Rfl: 3    IBUPROFEN OR, Take 1 tablet by mouth daily as needed., Disp: , Rfl:     MELATONIN OR, Take 1 tablet by mouth at bedtime., Disp: , Rfl:     metoprolol succinate ER 25 MG Oral Tablet 24 Hr, Take 1 tablet (25 mg total) by mouth daily., Disp: , Rfl:     metFORMIN 500 MG Oral Tab, Take 1 tablet (500 mg total) by mouth 2 (two) times daily with meals. (Patient not taking: Reported on 4/7/2025), Disp: 180 tablet, Rfl: 3    albuterol 108 (90 Base) MCG/ACT Inhalation Aero Soln, Inhale 2 puffs into the lungs every 4 (four) hours as needed for Wheezing., Disp: 25.5 g, Rfl: 3    HYDROcodone-acetaminophen 7.5-325 MG Oral Tab, Take 1 tablet by mouth every 4 to 6 hours as needed for Pain. (Patient not taking: Reported on 12/17/2024), Disp: 10 tablet, Rfl: 0    pravastatin 10 MG Oral Tab, Take 1 tablet (10 mg total) by mouth nightly., Disp: 90 tablet, Rfl: 3    Accu-Chek Softclix Lancets Does not apply Misc, 1 Lancet by Finger stick route 2 (two) times daily., Disp: 200 each, Rfl: 3    Glucose Blood  (ACCU-CHEK LORRAINE PLUS) In Vitro Strip, Check blood sugar twice daily, Disp: 200 strip, Rfl: 3    Accu-Chek Softclix Lancets Does not apply Misc, Check blood glucose once daily., Disp: 100 each, Rfl: 1    Spacer/Aero-Holding Chambers (COMPACT SPACE CHAMBER) Does not apply Device, TO BE USED WITH ALBUTEROL, INHALE 2 PUFFS EVERY 4 HOURS AS NEEDED, Disp: , Rfl:     meclizine 25 MG Oral Tab, Take 1 tablet (25 mg total) by mouth 2 (two) times daily as needed., Disp: 60 tablet, Rfl: 0    Glucose Blood In Vitro Strip, place 1 by Misc.(Non-Drug; Combo Route) route 3 times every day with glucose monitor, Disp: 100 strip, Rfl: 3    Blood Glucose Monitoring Suppl (BLOOD GLUCOSE MONITOR SYSTEM) w/Device Does not apply Kit, One Touch machine. Please check accucheck once daily, Disp: 1 kit, Rfl: 0    aspirin 81 MG Oral Tab, Take 1 tablet (81 mg total) by mouth daily., Disp: , Rfl:     Cyanocobalamin (VITAMIN B-12 CR OR), Take  by mouth., Disp: , Rfl:     Multiple Vitamins-Minerals (CENTRUM SILVER) Oral Tab, Take  by mouth. Take one tablet by mouth daily, Disp: , Rfl:

## 2025-06-25 PROBLEM — E66.01 SEVERE OBESITY (BMI 35.0-39.9) WITH COMORBIDITY (HCC): Chronic | Status: RESOLVED | Noted: 2018-02-22 | Resolved: 2025-06-25

## 2025-07-02 ENCOUNTER — OFFICE VISIT (OUTPATIENT)
Dept: FAMILY MEDICINE CLINIC | Facility: CLINIC | Age: 75
End: 2025-07-02
Payer: MEDICARE

## 2025-07-02 VITALS
RESPIRATION RATE: 16 BRPM | TEMPERATURE: 97 F | DIASTOLIC BLOOD PRESSURE: 72 MMHG | SYSTOLIC BLOOD PRESSURE: 114 MMHG | WEIGHT: 185 LBS | HEIGHT: 63.5 IN | HEART RATE: 73 BPM | BODY MASS INDEX: 32.37 KG/M2

## 2025-07-02 DIAGNOSIS — H25.13 AGE-RELATED NUCLEAR CATARACT OF BOTH EYES: ICD-10-CM

## 2025-07-02 DIAGNOSIS — Z12.11 SCREENING FOR COLON CANCER: ICD-10-CM

## 2025-07-02 DIAGNOSIS — Z00.00 ENCOUNTER FOR ANNUAL HEALTH EXAMINATION: Primary | ICD-10-CM

## 2025-07-02 DIAGNOSIS — J44.89 ASTHMA WITH COPD (CHRONIC OBSTRUCTIVE PULMONARY DISEASE) (HCC): Chronic | ICD-10-CM

## 2025-07-02 DIAGNOSIS — K57.90 DIVERTICULOSIS: ICD-10-CM

## 2025-07-02 DIAGNOSIS — E28.39 ESTROGEN DEFICIENCY: ICD-10-CM

## 2025-07-02 DIAGNOSIS — G47.33 OBSTRUCTIVE SLEEP APNEA: ICD-10-CM

## 2025-07-02 DIAGNOSIS — N89.8 VAGINAL ITCHING: ICD-10-CM

## 2025-07-02 DIAGNOSIS — E11.9 TYPE 2 DIABETES MELLITUS WITHOUT RETINOPATHY (HCC): ICD-10-CM

## 2025-07-02 DIAGNOSIS — Z12.31 SCREENING MAMMOGRAM FOR BREAST CANCER: ICD-10-CM

## 2025-07-02 PROBLEM — Z04.9 OBSERVATION FOR SUSPECTED CONDITION: Status: RESOLVED | Noted: 2024-12-03 | Resolved: 2025-07-02

## 2025-07-02 LAB
ALBUMIN SERPL-MCNC: 4.9 G/DL (ref 3.2–4.8)
ALBUMIN/GLOB SERPL: 1.8 {RATIO} (ref 1–2)
ALP LIVER SERPL-CCNC: 94 U/L (ref 55–142)
ALT SERPL-CCNC: 33 U/L (ref 10–49)
ANION GAP SERPL CALC-SCNC: 8 MMOL/L (ref 0–18)
AST SERPL-CCNC: 29 U/L (ref ?–34)
BILIRUB SERPL-MCNC: 0.6 MG/DL (ref 0.2–1.1)
BUN BLD-MCNC: 16 MG/DL (ref 9–23)
BUN/CREAT SERPL: 16.8 (ref 10–20)
CALCIUM BLD-MCNC: 9.9 MG/DL (ref 8.7–10.4)
CHLORIDE SERPL-SCNC: 104 MMOL/L (ref 98–112)
CHOLEST SERPL-MCNC: 113 MG/DL (ref ?–200)
CO2 SERPL-SCNC: 26 MMOL/L (ref 21–32)
CREAT BLD-MCNC: 0.95 MG/DL (ref 0.55–1.02)
CREAT UR-SCNC: 116.5 MG/DL
DEPRECATED RDW RBC AUTO: 46.9 FL (ref 35.1–46.3)
EGFRCR SERPLBLD CKD-EPI 2021: 62 ML/MIN/1.73M2 (ref 60–?)
ERYTHROCYTE [DISTWIDTH] IN BLOOD BY AUTOMATED COUNT: 14.8 % (ref 11–15)
FASTING PATIENT LIPID ANSWER: NO
FASTING STATUS PATIENT QL REPORTED: NO
GLOBULIN PLAS-MCNC: 2.8 G/DL (ref 2–3.5)
GLUCOSE BLD-MCNC: 91 MG/DL (ref 70–99)
HCT VFR BLD AUTO: 43.8 % (ref 35–48)
HDLC SERPL-MCNC: 44 MG/DL (ref 40–59)
HGB BLD-MCNC: 13.9 G/DL (ref 12–16)
LDLC SERPL CALC-MCNC: 54 MG/DL (ref ?–100)
MCH RBC QN AUTO: 27.9 PG (ref 26–34)
MCHC RBC AUTO-ENTMCNC: 31.7 G/DL (ref 31–37)
MCV RBC AUTO: 87.8 FL (ref 80–100)
MICROALBUMIN UR-MCNC: <0.3 MG/DL
NONHDLC SERPL-MCNC: 69 MG/DL (ref ?–130)
OSMOLALITY SERPL CALC.SUM OF ELEC: 287 MOSM/KG (ref 275–295)
PLATELET # BLD AUTO: 175 10(3)UL (ref 150–450)
POTASSIUM SERPL-SCNC: 4.5 MMOL/L (ref 3.5–5.1)
PROT SERPL-MCNC: 7.7 G/DL (ref 5.7–8.2)
RBC # BLD AUTO: 4.99 X10(6)UL (ref 3.8–5.3)
SODIUM SERPL-SCNC: 138 MMOL/L (ref 136–145)
TRIGL SERPL-MCNC: 73 MG/DL (ref 30–149)
TSI SER-ACNC: 2.6 UIU/ML (ref 0.55–4.78)
VLDLC SERPL CALC-MCNC: 11 MG/DL (ref 0–30)
WBC # BLD AUTO: 8.1 X10(3) UL (ref 4–11)

## 2025-07-02 PROCEDURE — 82043 UR ALBUMIN QUANTITATIVE: CPT | Performed by: FAMILY MEDICINE

## 2025-07-02 PROCEDURE — 80061 LIPID PANEL: CPT | Performed by: FAMILY MEDICINE

## 2025-07-02 PROCEDURE — 85027 COMPLETE CBC AUTOMATED: CPT | Performed by: FAMILY MEDICINE

## 2025-07-02 PROCEDURE — 80053 COMPREHEN METABOLIC PANEL: CPT | Performed by: FAMILY MEDICINE

## 2025-07-02 PROCEDURE — 82570 ASSAY OF URINE CREATININE: CPT | Performed by: FAMILY MEDICINE

## 2025-07-02 PROCEDURE — 84443 ASSAY THYROID STIM HORMONE: CPT | Performed by: FAMILY MEDICINE

## 2025-07-02 RX ORDER — FLUCONAZOLE 150 MG/1
150 TABLET ORAL ONCE
Qty: 1 TABLET | Refills: 0 | Status: SHIPPED | OUTPATIENT
Start: 2025-07-02 | End: 2025-07-02

## 2025-07-02 NOTE — PROGRESS NOTES
Subjective:   Silvana Morales is a 74 year old female who presents for a MA AHA (Medicare Advantage Annual Health Assessment) and Medicare Subsequent Annual Wellness visit (Pt already had Initial Annual Wellness) and scheduled follow up of multiple significant but stable problems.   History of Present Illness  Silvana Morales is a 74 year old female who presents for a routine follow-up visit.    She has experienced significant weight loss, losing 21 pounds since starting Mounjaro, with a total weight loss of over 25 pounds since December 2024. She is currently on a 10 mg dose of Mounjaro and experiences gastrointestinal side effects, including diarrhea, particularly after consuming certain foods like baked beans and pork chops. She is also following the Weight Watchers program and feels more energetic, able to walk faster and climb stairs without difficulty. She no longer takes metformin.    She experiences persistent vaginal itching that worsens after showering, sometimes leading to bleeding due to scratching. She wonders if it could be a yeast infection or related to detergent use. She declines exam.       Her asthma is well-controlled with daily Advair use, and she uses albuterol only when exercising, such as on the treadmill.    She has a history of vertigo and occasional urinary incontinence, particularly when laughing or coughing. She has not had any recent falls resulting in injury, although she did fall once while visiting someone in the hospital.    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 Her  is on oxygen and has limited mobility due to ongoing health issues.    She experiences ringing in her ears and has noticed better hearing in one ear compared to the other. She has not had a hearing test and does not express interest in one at this time.      History/Other:   Fall Risk Assessment:   She has been screened for  Falls and is High Risk. Fall Prevention information provided to patient in After Visit Summary.    Do you feel unsteady when standing or walking?: (Patient-Rptd) Yes  Do you worry about falling?: (Patient-Rptd) Yes  Have you fallen in the past year?: (Patient-Rptd) No     Cognitive Assessment:   She had a completely normal cognitive assessment - see flowsheet entries     Functional Ability/Status:   Silvana Morales has some abnormal functions as listed below:  She has Vision problems based on screening of functional status.       Depression Screening (PHQ):  PHQ-2 SCORE: 0  , done 7/2/2025   Last [place] Suicide Screening on 7/2/2025 was No Risk.          Advanced Directives:   She does NOT have a Living Will. [Do you have a living will?: (Patient-Rptd) No]  She does NOT have a Power of  for Health Care. [Do you have a healthcare power of ?: (Patient-Rptd) No]  Discussed Advance Care Planning with patient (and family/surrogate if present). Standard forms made available to patient in After Visit Summary.      Patient Active Problem List   Diagnosis    Obstructive sleep apnea    Asthma with COPD (chronic obstructive pulmonary disease) (HCC)    Type 2 diabetes mellitus without retinopathy (HCC)    Age-related nuclear cataract of both eyes    Diverticulosis    BMI 31.0-31.9,adult     Allergies:  She is allergic to penicillins.    Current Medications:  Outpatient Medications Marked as Taking for the 7/2/25 encounter (Office Visit) with Weiler, Colleen M, DO   Medication Sig    fluconazole (DIFLUCAN) 150 MG Oral Tab Take 1 tablet (150 mg total) by mouth once for 1 dose. Repeat dose in 72 hours if no improvement    nystatin 100,000 Units/g External Cream Apply 1 Application  topically 2 (two) times daily.    montelukast 10 MG Oral Tab Take 1 tablet (10 mg total) by mouth nightly. At Bedtime    fluticasone propionate (FLONASE) 50 MCG/ACT Nasal Suspension 2 sprays by Nasal route daily.    Tirzepatide  (MOUNJARO) 10 MG/0.5ML Subcutaneous Solution Auto-injector Inject 10 mg into the skin once a week.    fluticasone-salmeterol 250-50 MCG/ACT Inhalation Aerosol Powder, Breath Activated INHALE ONE PUFF BY MOUTH EVERY 12 HOURS    omeprazole 20 MG Oral Capsule Delayed Release Take 1 capsule (20 mg total) by mouth every morning.    IBUPROFEN OR Take 1 tablet by mouth daily as needed.    MELATONIN OR Take 1 tablet by mouth at bedtime.    metoprolol succinate ER 25 MG Oral Tablet 24 Hr Take 1 tablet (25 mg total) by mouth daily.    albuterol 108 (90 Base) MCG/ACT Inhalation Aero Soln Inhale 2 puffs into the lungs every 4 (four) hours as needed for Wheezing.    pravastatin 10 MG Oral Tab Take 1 tablet (10 mg total) by mouth nightly.    meclizine 25 MG Oral Tab Take 1 tablet (25 mg total) by mouth 2 (two) times daily as needed.    aspirin 81 MG Oral Tab Take 1 tablet (81 mg total) by mouth daily.    Cyanocobalamin (VITAMIN B-12 CR OR) Take  by mouth.    Multiple Vitamins-Minerals (CENTRUM SILVER) Oral Tab Take  by mouth. Take one tablet by mouth daily       Medical History:  She  has a past medical history of Arrhythmia, Asthma (HCC), Chest pain, Diabetes (HCC), Diverticulosis, Esophageal reflux, Extrinsic asthma, unspecified, High cholesterol, History of blood transfusion, Pneumonia due to organism, Polyp of colon (07/2017), Pulmonary embolism (HCC), Radial polydactyly (11/04/2024), Sleep apnea, and Visual impairment.  Surgical History:  She  has a past surgical history that includes cholecystectomy; hysterectomy; Cardiac catheterization; colonoscopy; colonoscopy (N/A, 07/25/2017); colonoscopy (N/A, 09/23/2020); and adj tiss xfer head,fac,hand <10sqcm (Left, 12/03/2024).   Family History:  Her family history includes Cancer (age of onset: 53) in her sister; DVT in her son; Diabetes in her sister; Diabetes (age of onset: 67) in her mother; Hypertension in her brother.  Social History:  She  reports that she quit smoking  about 45 years ago. Her smoking use included cigarettes. She started smoking about 75 years ago. She has never been exposed to tobacco smoke. She has never used smokeless tobacco. She reports that she does not currently use alcohol after a past usage of about 1.0 standard drink of alcohol per week. She reports that she does not use drugs.    Tobacco:  She smoked tobacco in the past but quit greater than 12 months ago.  Social History     Tobacco Use   Smoking Status Former    Current packs/day: 0.00    Types: Cigarettes    Start date: 1950    Quit date: 1980    Years since quittin.5    Passive exposure: Never   Smokeless Tobacco Never        CAGE Alcohol Screen:   CAGE screening score of 0 on 2025, showing low risk of alcohol abuse.      Patient Care Team:  Weiler, Colleen M, DO as PCP - General (Family Medicine)  Weiler, Colleen M, DO as PCP - Family Practitioner (Family Medicine)    Review of Systems  See HPI    Objective:   Physical Exam       /72   Pulse 73   Temp 97.2 °F (36.2 °C) (Temporal)   Resp 16   Ht 5' 3.5\" (1.613 m)   Wt 185 lb (83.9 kg)   BMI 32.26 kg/m²  Estimated body mass index is 32.26 kg/m² as calculated from the following:    Height as of this encounter: 5' 3.5\" (1.613 m).    Weight as of this encounter: 185 lb (83.9 kg).    Medicare Hearing Assessment:   Hearing Screening    Screening Method: Questionnaire  I have a problem hearing over the telephone: No I have trouble following the conversations when two or more people are talking at the same time: No   I have trouble understanding things on the TV: No I have to strain to understand conversations: No   I have to worry about missing the telephone ring or doorbell: No I have trouble hearing conversations in a noisy background such as a crowded room or restaurant: No   I get confused about where sounds come from: No I misunderstand some words in a sentence and need to ask people to repeat themselves: No   I especially  have trouble understanding the speech of women and children: No I have trouble understanding the speaker in a large room such as at a meeting or place of Yazidi: No   Many people I talk to seem to mumble (or don't speak clearly): No People get annoyed because I misunderstand what they say: No   I misunderstand what others are saying and make inappropriate responses: No I avoid social activities because I cannot hear well and fear I will reply improperly: No   Family members and friends have told me they think I may have hearing loss: No             Visual Acuity:   Right Eye Visual Acuity: Corrected Right Eye Chart Acuity: 20/20   Left Eye Visual Acuity: Corrected Left Eye Chart Acuity: 20/30   Both Eyes Visual Acuity: Corrected Both Eyes Chart Acuity: 20/20   Able To Tolerate Visual Acuity: Yes      Gen:  Well-nourished.  No distress.  HEENT: Conjunctive clear.  Ismael ear canals clear.  Ismael TMs intact with good landmarks noted.  Nares patent.  Oral mucous membrane moist.  Normal lips, teeth, and gums.  Oropharynx normal.  Neck supple.  Good ROM.  No LAD.  Thyroid normal.  CV:  Regular rate and rhythm; no murmurs  Lungs:  Clear to ausculation; good aeration               No wheezes, rales or rhonchi  Abd: soft, non-tender, non-distended          Normal bowel sounds; no masses          No hepatosplenomegaly  Breasts:  Normal appearance bilateral.  No masses or lesions noted.  Normal nipples bilateral.  No nipple discharge noted.  Normal lymph nodes.  Extremities: No cyanosis, clubbing, edema.  Pedal pulses 2+ ismael.      Assessment & Plan:   Silvana Morales is a 74 year old female who presents for a Medicare Assessment.     1. Encounter for annual health examination (Primary)  -     CBC, Platelet; No Differential; Future; Expected date: 07/02/2025  -     Comp Metabolic Panel (14); Future; Expected date: 07/02/2025  -     Lipid Panel; Future; Expected date: 07/02/2025  -     Assay, Thyroid Stim Hormone; Future; Expected  date: 07/02/2025  2. Screening mammogram for breast cancer  -     [facility] BLAINE 2D+3D SCREENING BILAT (CPT=77067/29848); Future; Expected date: 07/02/2025  3. Type 2 diabetes mellitus without retinopathy ([facility])  -     Microalb/Creat Ratio, Random Urine; Future; Expected date: 07/02/2025  -     Ophthalmology Referral - In Network  4. Screening for colon cancer  -     Gastro Referral - In Network  5. Estrogen deficiency  -     XR DEXA BONE DENSITOMETRY (CPT=77080); Future; Expected date: 07/02/2025  6. Vaginal itching  -     Midwife Referral - Internal  7. Asthma with COPD (chronic obstructive pulmonary disease) ([facility])  8. BMI 31.0-31.9,adult  9. Diverticulosis  10. Age-related nuclear cataract of both eyes  11. Obstructive sleep apnea  Other orders  -     Fluconazole; Take 1 tablet (150 mg total) by mouth once for 1 dose. Repeat dose in 72 hours if no improvement  Dispense: 1 tablet; Refill: 0  -     Prevnar 20 (PCV20) [10925]    Assessment & Plan  Vaginal Itching  Persistent vaginal itching worsens without daily showering, leading to bleeding from scratching. Differential includes yeast infection, postmenopausal atrophic vaginitis, or contact dermatitis. Prefers no physical examination currently. Agrees to oral treatment for yeast infection.  - Prescribe oral Diflucan for suspected yeast infection  - Provide information on midwives and gynecology team for further evaluation if symptoms persist  - Discuss potential causes including low estrogen and skin changes/vulva changes  - Advise on monitoring for any new detergents that may cause irritation    Type 2 Diabetes Mellitus  Diabetes is well-controlled with hemoglobin A1c of 5.7, improved from 7.1. She has lost over 25 pounds since December 2024, attributed to Mounjaro and Weight Watchers. Experiences gastrointestinal side effects from Mounjaro but manages them with dietary choices. Aware of dietary triggers and participates in Weight Watchers for support.  - Continue  Mounjaro at current dose  - Continue Weight Watchers program  - Monitor for gastrointestinal side effects and adjust diet accordingly    Urinary Incontinence  Experiences urinary leakage when laughing or coughing, attributed to aging. Instructed on Kegel exercises to strengthen pelvic floor muscles.  - Instruct on Kegel exercises to strengthen pelvic floor muscles  - Discuss potential for physical therapy if symptoms worsen    Asthma with COPD  Asthma is well-controlled with daily Advair and occasional albuterol during exercise.  - Continue Advair daily  - Use albuterol as needed for exercise    Hearing Loss  Reports tinnitus and decreased hearing in one ear but is not interested in a hearing test currently.  - Consider audiology referral if hearing worsens    Diverticulosis    Asymptomatic.  Due for colonoscopy and she would like to get one.  Refer to GI.     Obstructive sleep apnea    Using CPAP    General Health Maintenance  Due for several screenings and vaccinations. Active, eats healthily, and has recently lost weight, contributing to overall well-being. Discussed scheduling of screenings and vaccinations.  - Administer pneumonia vaccine  - Schedule colonoscopy with Dr. Infante  - Schedule mammogram for October  - Refer to [facility] for ophthalmology evaluation  - Discuss potential for bone density scan during next visit to [place]    Follow-up  Scheduled for routine follow-up and monitoring of her conditions. Blood tests to be performed during current visit.  - Schedule follow-up appointment in six months  - Perform blood tests during current visit    The patient indicates understanding of these issues and agrees to the plan.  Reinforced healthy diet, lifestyle, and exercise.      Return in 6 months (on 1/2/2026).     Colleen Weiler, DO, 7/2/2025     Supplementary Documentation:   General Health:  In the past six months, have you lost more than 10 pounds without trying?: (Patient-Rptd) 2 - No  Has  your appetite been poor?: (Patient-Rptd) No  Type of Diet: (Patient-Rptd) Diabetic  How does the patient maintain a good energy level?: (Patient-Rptd) Appropriate Exercise  How would you describe your daily physical activity?: (Patient-Rptd) Moderate  How would you describe your current health state?: (Patient-Rptd) Fair  How do you maintain positive mental well-being?: (Patient-Rptd) Games, Visiting Family  On a scale of 0 to 10, with 0 being no pain and 10 being severe pain, what is your pain level?: (Patient-Rptd) 0 - (None)  In the past six months, have you experienced urine leakage?: (Patient-Rptd) 1-Yes  At any time do you feel concerned for the safety/well-being of yourself and/or your children, in your home or elsewhere?: (Patient-Rptd) No  Have you had any immunizations at another office such as Influenza, Hepatitis B, Tetanus, or Pneumococcal?: (Patient-Rptd) No    Health Maintenance   Topic Date Due    Zoster Vaccines (1 of 2) Never done    Diabetes Care Dilated Eye Exam  06/21/2024    COVID-19 Vaccine (7 - 2024-25 season) 09/01/2024    Annual Well Visit  01/01/2025    Diabetes Care: Microalb/Creat Ratio (Annual)  01/01/2025    Colorectal Cancer Screening  09/23/2025    Diabetes Care: GFR  07/10/2025    Influenza Vaccine (1) 10/01/2025    Diabetes Care A1C  10/07/2025    Diabetes Care Foot Exam  01/06/2026    DEXA Scan  Completed    Annual Depression Screening  Completed    Fall Risk Screening (Annual)  Completed    Pneumococcal Vaccine: 50+ Years  Completed    Meningococcal B Vaccine  Aged Out    Mammogram  Discontinued     Colleen Weiler, DO

## 2025-07-02 NOTE — PROGRESS NOTES
The following individual(s) verbally consented to be recorded using ambient AI listening technology and understand that they can each withdraw their consent to this listening technology at any point by asking the clinician to turn off or pause the recording:    Patient name: Silvana Morales  Additional names:

## 2025-07-10 ENCOUNTER — TELEPHONE (OUTPATIENT)
Facility: CLINIC | Age: 75
End: 2025-07-10

## 2025-07-10 DIAGNOSIS — Z12.11 COLON CANCER SCREENING: Primary | ICD-10-CM

## 2025-07-11 NOTE — TELEPHONE ENCOUNTER
COLONOSCOPY REPORT           Silvana Morales      1950 Age 70 year old   PCP Colleen Weiler, DO Endoscopist Tony Infante MD      Date of procedure: 20     Procedure: Colonoscopy      Pre-operative diagnosis: Screening     Post-operative diagnosis: Colon diverticulosis, internal hemorrhoids     Medications: MAC     Withdrawal time: 17 minutes     Procedure:  Informed consent was obtained from the patient after the risks of the procedure were discussed, including but not limited to bleeding, perforation, aspiration, infection, or possibility of a missed lesion. After discussions of the risks/benefits and alternatives to this procedure, as well as the planned sedation, the patient was placed in the left lateral decubitus position and begun on continuous blood pressure pulse oximetry and EKG monitoring and this was maintained throughout the procedure. Once an adequate level of sedation was obtained a digital rectal exam was completed. Then the lubricated tip of the [id number] diagnostic video colonoscope was inserted and advanced without difficulty to the cecum using the CO2 insufflation technique. The cecum was identified by localizing the trifold, the appendix and the ileocecal valve. Withdrawal was begun with thorough washing and careful examination of the colonic walls and folds. A routine second examination of the cecum/ascending colon was performed. Photodocumentation was obtained. The bowel prep was good. Views of the colon were good with washing. I then carefully withdrew the instrument from the patient who tolerated the procedure well.      Complications: none.     Findings:   1. NO polyp(s) noted.     2. Diverticulosis: mild in the sigmoid.     3. Terminal ileum: the visualized mucosa appeared normal.     4. A retroflexed view of the rectum revealed small internal hemorrhoids.     5. The colonic mucosa throughout the colon showed normal vascular pattern, without evidence of  angioectasias or inflammation.      6. HANNAH: normal rectal tone, no masses palpated.      Impression:   No polyps noted.  Mild sigmoid diverticulosis, internal hemorrhoids.      Recommend:  Repeat CLN in 5 years duet hx of polyps if patient interested in continuing screening after the age of 74.  High fiber diet.  Monitor for blood in the stool. If having more than just tinge of blood, call office or go to the ER.     Specimens: none               Electronically signed by RUPERT Infante MD at 9/23/2020  9:31 AM

## 2025-07-11 NOTE — TELEPHONE ENCOUNTER
Colon recall  Medications, pharmacy, and allergies reviewed.     › H/W: 5'3.5\"/185lbs  › BMI: 32.25    Special comments/notes:  Recall    Last Procedure, Date, MD:   Colonoscopy, 09/2020, LEONEL   Last diagnosis: Normal cln   Recalled for (mth/yrs): 5 years (Hx of polyps)   Sedation used previously: MAC   Last Prep Used: trilyte   Quality of Prep: good     Telephone Colon Screening Questionnaire Yes No   Are you currently experiencing any GI symptoms [] [x]   If yes, explain     Rectal bleeding [] [x]   Black stool [] [x]   Dysphagia or food \"feeling stuck\" when eating [] [x]   Intractable vomiting [] [x]   Unexplained weight loss [] [x]   First colonoscopy [] [x]   Family history of colon cancer [] [x]   Any issues with anesthesia [] [x]   If yes, explain      In the last 12 months, complaints of chest pain or shortness of breath  [] [x]   Referred to a cardiologist?  [] [x]   If yes, explain      Respiratory Condition: oxygen/ANTONIO/COPD [x] []   CPAP/BiPAP     History of heart attack or stroke [] [x]   If yes, in the last 12 months?  [] [x]   History of devices (pacemaker/defibrillator/stent placement) [] [x]     Medication Reconciliation  Yes  No   Anticoagulation [] [x]   Diuretics  [] [x]   ACE/ARB's/Combo [] [x]   Diabetic medication Mounjaro [x] []   Weight loss medication (phentermine/vyvanse/saxsenda) [] [x]   Iron/vitamin E/herbal/multivitamin supplements [] [x]   NSAID/ASA   Aspirin [x] []   Marijuana/CBD/vaping use [] [x]

## 2025-07-22 NOTE — TELEPHONE ENCOUNTER
GI Scheduling Staff:     Please schedule: Colonoscopy with MAC     Please send SPLIT dose Golytely bowel prep.   -Buy over the counter dulcolax laxative, and take one tablet daily for 3 days prior to drinking the bowel prep.       Diagnosis: Screening    Medication adjustments:  -Hold mounjaro for 1 week    >>>Please inform patient if new medications are started after scheduling procedure they need to call clinic to notify us.

## 2025-07-23 NOTE — TELEPHONE ENCOUNTER
Scheduled for:  Colonoscopy [id number]   Provider Name:  Dr. Infante   Date:  11/11/2025   Location:    [place]  Sedation:  Mac  Time:  8:50 (pt is aware that ENDO will call the day before to confirm arrival time)  Prep:  Golytely and Buy over the counter dulcolax laxative, and take one tablet daily for 3 days prior to drinking the bowel prep.   Meds/Allergies Reconciled?:  Physician reviewed   Diagnosis with codes:  Colon cancer screening Z12.11  Was patient informed to call insurance with codes (Y/N):  Yes, I confirmed [facility] insurance with the patient.   Referral sent?:  Referral was sent at the time of electronic surgical scheduling.  [place] or [facility] notified?:  I sent an electronic request to Endo Scheduling and received a confirmation today.   Medication Orders:  This patient verbally confirmed that she is not taking:   Iron, blood thinners, BP meds, and is not diabetic   Not latex allergy, Not PCN allergy and does not have a pacemaker  Misc Orders:  I discussed the prep instructions with the patient which she verbally understood and is aware that I will mychart the instructions today.       Further instructions given by staff:     Hold mounjaro for 1 week

## 2025-07-25 RX ORDER — PRAVASTATIN SODIUM 10 MG
10 TABLET ORAL NIGHTLY
Qty: 90 TABLET | Refills: 3 | Status: SHIPPED | OUTPATIENT
Start: 2025-07-25

## 2025-07-25 NOTE — TELEPHONE ENCOUNTER
Refill Per Protocol     Requested Prescriptions   Pending Prescriptions Disp Refills    PRAVASTATIN 10 MG Oral Tab [Pharmacy Med Name: Pravastatin Sodium 10 Mg Tab [facility]] 90 tablet 0     Sig: Take 1 tablet (10 mg total) by mouth nightly.       Cholesterol Medication Protocol Passed - 7/25/2025 12:20 PM        Passed - ALT < 80     Lab Results   Component Value Date    ALT 33 07/02/2025             Passed - ALT resulted within past year        Passed - Lipid panel within past 12 months     Lab Results   Component Value Date    CHOLEST 113 07/02/2025    TRIG 73 07/02/2025    HDL 44 07/02/2025    LDL 54 07/02/2025    VLDL 11 07/02/2025    TCHDLRATIO 3.3 08/31/2023    NONHDLC 69 07/02/2025             Passed - In person appointment or virtual visit in the past 12 mos or appointment in next 3 mos     Recent Outpatient Visits              3 weeks ago Encounter for annual health examination    [facility] Weiler, Colleen M, DO    Office Visit    3 months ago Type 2 diabetes mellitus without retinopathy (HCC)    [facility] Luz Baez APRN    Office Visit    6 months ago Type 2 diabetes mellitus without retinopathy (HCC)    [facility] Luz Baez APRN    Office Visit    7 months ago Visit for suture removal    [facility]    Nurse Only    8 months ago Benign neoplasm of skin of upper limb, including shoulder, left    [facility] Dexter Garcia MD    Office Visit          Future Appointments           Provider Department     In 3 days OPO SCHEDULED RESOURCE [facility]    Appt Notes: OSBALDO      In 1 week [facility] RN RADIOLOGY 3; [facility] CT RM1 CARD [facility] [place]    Appt Notes: **      In 1 week [facility] CT RM4 FFR [facility] CT     In 3 weeks Luz Baez  NIKKIE Wilson [facility], [facility]    Appt Notes: Diabetes Follow Up      In 3 months [address] Mammography - [place]     In 3 months HARSHAD AGUILAR [facility]    Appt Notes: Colon w/ mac @ [facility]                     Passed - Medication is active on med list

## 2025-07-28 ENCOUNTER — LAB ENCOUNTER (OUTPATIENT)
Dept: LAB | Age: 75
End: 2025-07-28
Attending: NURSE PRACTITIONER

## 2025-07-28 DIAGNOSIS — Z01.818 PRE-OP TESTING: Primary | ICD-10-CM

## 2025-07-28 LAB
BUN BLD-MCNC: 14 MG/DL (ref 9–23)
CREAT BLD-MCNC: 0.89 MG/DL (ref 0.55–1.02)
EGFRCR SERPLBLD CKD-EPI 2021: 68 ML/MIN/1.73M2 (ref 60–?)

## 2025-07-28 PROCEDURE — 82565 ASSAY OF CREATININE: CPT

## 2025-07-28 PROCEDURE — 36415 COLL VENOUS BLD VENIPUNCTURE: CPT

## 2025-07-28 PROCEDURE — 84520 ASSAY OF UREA NITROGEN: CPT

## 2025-08-01 RX ORDER — METOPROLOL TARTRATE 50 MG
50 TABLET ORAL AS DIRECTED
COMMUNITY
Start: 2025-07-16

## 2025-08-14 ENCOUNTER — APPOINTMENT (OUTPATIENT)
Dept: CT IMAGING | Facility: HOSPITAL | Age: 75
End: 2025-08-14
Attending: NURSE PRACTITIONER

## 2025-08-14 ENCOUNTER — HOSPITAL ENCOUNTER (OUTPATIENT)
Dept: CT IMAGING | Facility: HOSPITAL | Age: 75
Discharge: HOME | End: 2025-08-14
Attending: NURSE PRACTITIONER

## 2025-08-14 VITALS
DIASTOLIC BLOOD PRESSURE: 62 MMHG | WEIGHT: 183 LBS | HEIGHT: 64 IN | SYSTOLIC BLOOD PRESSURE: 121 MMHG | HEART RATE: 71 BPM | BODY MASS INDEX: 31.24 KG/M2

## 2025-08-14 DIAGNOSIS — R94.39 ABNORMAL NUCLEAR STRESS TEST: ICD-10-CM

## 2025-08-14 DIAGNOSIS — R06.02 SOB (SHORTNESS OF BREATH): ICD-10-CM

## 2025-08-14 RX ORDER — METOPROLOL TARTRATE 1 MG/ML
INJECTION, SOLUTION INTRAVENOUS
Status: DISCONTINUED
Start: 2025-08-14 | End: 2025-08-14 | Stop reason: WASHOUT

## 2025-08-14 RX ORDER — METOPROLOL TARTRATE 25 MG/1
100 TABLET, FILM COATED ORAL ONCE AS NEEDED
Status: COMPLETED | OUTPATIENT
Start: 2025-08-14 | End: 2025-08-14

## 2025-08-14 RX ORDER — NITROGLYCERIN 0.4 MG/1
0.4 TABLET SUBLINGUAL ONCE
OUTPATIENT
Start: 2025-08-14 | End: 2025-08-14

## 2025-08-14 RX ORDER — DILTIAZEM HYDROCHLORIDE 5 MG/ML
5 INJECTION INTRAVENOUS SEE ADMIN INSTRUCTIONS
OUTPATIENT
Start: 2025-08-14

## 2025-08-14 RX ORDER — METOPROLOL TARTRATE 25 MG/1
50 TABLET, FILM COATED ORAL ONCE AS NEEDED
Status: COMPLETED | OUTPATIENT
Start: 2025-08-14 | End: 2025-08-14

## 2025-08-14 RX ORDER — METOPROLOL TARTRATE 1 MG/ML
5 INJECTION, SOLUTION INTRAVENOUS SEE ADMIN INSTRUCTIONS
OUTPATIENT
Start: 2025-08-14

## 2025-08-14 RX ORDER — METOPROLOL TARTRATE 25 MG/1
TABLET, FILM COATED ORAL
Status: COMPLETED
Start: 2025-08-14 | End: 2025-08-14

## 2025-08-14 RX ADMIN — METOPROLOL TARTRATE 100 MG: 25 TABLET, FILM COATED ORAL at 10:10:00

## 2025-08-14 RX ADMIN — METOPROLOL TARTRATE 50 MG: 25 TABLET, FILM COATED ORAL at 10:55:00

## 2025-08-20 PROBLEM — J45.40 MODERATE PERSISTENT ASTHMA WITHOUT COMPLICATION (HCC): Status: ACTIVE | Noted: 2025-08-20

## 2025-08-20 PROBLEM — J44.89 ASTHMA WITH COPD (CHRONIC OBSTRUCTIVE PULMONARY DISEASE) (HCC): Chronic | Status: RESOLVED | Noted: 2017-04-05 | Resolved: 2025-08-20

## 2025-08-30 ENCOUNTER — HOSPITAL ENCOUNTER (OUTPATIENT)
Age: 75
Discharge: HOME | End: 2025-08-30

## 2025-08-30 VITALS
HEART RATE: 87 BPM | RESPIRATION RATE: 20 BRPM | DIASTOLIC BLOOD PRESSURE: 63 MMHG | OXYGEN SATURATION: 98 % | TEMPERATURE: 99 F | SYSTOLIC BLOOD PRESSURE: 134 MMHG

## 2025-08-30 DIAGNOSIS — Z20.822 ENCOUNTER FOR LABORATORY TESTING FOR COVID-19 VIRUS: ICD-10-CM

## 2025-08-30 DIAGNOSIS — J06.9 VIRAL URI WITH COUGH: Primary | ICD-10-CM

## 2025-08-30 LAB — SARS-COV-2 RNA RESP QL NAA+PROBE: NOT DETECTED

## 2025-08-30 RX ORDER — CETIRIZINE HYDROCHLORIDE 10 MG/1
10 TABLET ORAL DAILY
Qty: 30 TABLET | Refills: 0 | Status: SHIPPED | OUTPATIENT
Start: 2025-08-30 | End: 2025-09-29

## (undated) DIAGNOSIS — M84.48XD PATHOLOGICAL FRACTURE OF VERTEBRA WITH ROUTINE HEALING, UNSPECIFIED PATHOLOGICAL CAUSE, SUBSEQUENT ENCOUNTER: Primary | ICD-10-CM

## (undated) DEVICE — ENDOSCOPY PACK - LOWER: Brand: MEDLINE INDUSTRIES, INC.

## (undated) DEVICE — MASK PROC W/VISOR ANTIGLARE

## (undated) DEVICE — BANDAGE COMPR 2INX5YD TAN E HYPOALRG SLF COBAN

## (undated) DEVICE — SUT PROL 4-0 18IN P-3 NABSRB BLU L13MM 3/8 CI

## (undated) DEVICE — SOLUTION IRRIG 1000ML 0.9% NACL USP BTL

## (undated) DEVICE — MEDI-VAC NON-CONDUCTIVE SUCTION TUBING 6MM X 1.8M (6FT.) L: Brand: CARDINAL HEALTH

## (undated) DEVICE — TRAP 4 CPTR CHMBR N EZ INLN

## (undated) DEVICE — Device

## (undated) DEVICE — GLOVE SUR 7 SENSICARE PI MIC PIP CRM PWD F

## (undated) DEVICE — LINE MNTR ADLT SET O2 INTMD

## (undated) DEVICE — GOWN,SIRUS,FAB REINF,RAGLAN,L,STERILE: Brand: MEDLINE

## (undated) DEVICE — Device: Brand: CUSTOM PROCEDURE KIT

## (undated) DEVICE — 35 ML SYRINGE REGULAR TIP: Brand: MONOJECT

## (undated) DEVICE — Device: Brand: DEFENDO AIR/WATER/SUCTION AND BIOPSY VALVE

## (undated) DEVICE — SNARE 9MM 230CM 2.4MM EXACTO

## (undated) DEVICE — DISPOSABLE TOURNIQUET CUFF SINGLE BLADDER, DUAL PORT AND QUICK CONNECT CONNECTOR: Brand: COLOR CUFF

## (undated) DEVICE — PACK CDS PLASTIC HAND

## (undated) DEVICE — SPLINT CAST 2X12IN FBRGLS PRECUT ORTH-GLS

## (undated) DEVICE — STERILE TETRA-FLEX CF, ELASTIC BANDAGE, 2" X 5.5YD: Brand: TETRA-FLEX™CF

## (undated) NOTE — LETTER
ASTHMA ACTION PLAN for Caralee Dose     : 1950     Date: 23  Doctor:  Albina Dunlap DO  Phone for doctor or clinic: [facility], [address] [address] [phone]      ACT Score: 20    ACT Goal: 20 or greater    Call your provider if you require your rescue/quick reliever medication more than 2-3 times in a 24 hour period. If you require your rescue inhaler/medication more than 2-3 times weekly, your asthma may not be under proper control and you should seek medical attention. *Quick Relievers are Xopenex and Albuterol*    You can use the colors of a traffic light to help learn about your asthma medicines. Year Round       1. Green - Go! % of Personal Best Peak Flow   Use controller medicine. Breathing is good  No cough or wheeze  Can work and play Medicine How much to take When to take it    Medications       Leukotriene Modulators Instructions     montelukast 10 MG Oral Tab    Take 1 tablet (10 mg total) by mouth nightly. At Bedtime      Sympathomimetics Instructions     fluticasone-salmeterol 250-50 MCG/ACT Inhalation Aerosol Powder, Breath Activated    INHALE ONE PUFF BY MOUTH EVERY 12 HOURS                    2. Yellow - Caution. 50-79% Personal Best Peak Flow  Use reliever medicine to keep an asthma attack from getting bad. Cough  Quick Relievers  Wheezing  Tight Chest  Wake up at night Medicine How much to take When to take it    If symptoms are not improving in 24-48 hrs, call office for further instructions  Medications       Leukotriene Modulators Instructions     montelukast 10 MG Oral Tab    Take 1 tablet (10 mg total) by mouth nightly. At Bedtime      Sympathomimetics Instructions     fluticasone-salmeterol 250-50 MCG/ACT Inhalation Aerosol Powder, Breath Activated    INHALE ONE PUFF BY MOUTH EVERY 12 HOURS                    3. Red - Stop!  Danger! <50% Personal Best Peak Flow  Continue Controller Medications But ADD:   Medicine not helping  Breathing is hard and fast  Nose opens wide  Can't walk  Ribs show  Can't talk well Medicine How much to take When to take it    If your symptoms do not improve in ONE hour -  go to the emergency room or call 911 immediately! If symptoms improve, call office for appointment immediately. Albuterol inhaler 2 puffs every 20 minutes for three treatments       Don't forget:  Rinse mouth after using inhaler  Use spacer for inhaler  Remember to get your Flu vaccine every fall! [x] Asthma Action Plan reviewed with the caregiver and patient, and a copy of the plan was given to the patient/caregiver. [] Asthma Action Plan reviewed with the caregiver and patient on the phone, and copy mailed to patient/caregiver or sent via [address]      Signatures:   Provider  Paty Valdes, DO Patient  Ezequiel Branch Caretaker

## (undated) NOTE — LETTER
December 13, 2018    Maria E Galan [address] [facility]     Patient: Vicente Tejada   YOB: 1950   Date of Visit: 12/13/2018       Dear Dr. Jessica Martell, DO:    Thank you for referring Vicente Tejada to me for e Quit date: 1980        Years since quittin.9      Smokeless tobacco: Never Used    Alcohol use:  Yes      Alcohol/week: 0.6 oz      Types: 1 Shots of liquor per week      Comment: \"socially\"    Drug use: No      Medications:    Current Outp Lancets Misc.  Does not apply Misc To check blood sugar once daily Disp: 100 each Rfl: 3   ONETOUCH ULTRASOFT LANCETS Does not apply Misc Test by Intradermal route  every day Disp: 100 each Rfl: 11   Blood Glucose Monitoring Suppl ([address]) W/ Left +1.50 Sphere    Type:  OTC reading only          Manifest Refraction    NVA with a +2.00  OD 20/20  OS 20/20    I recommend +2.00 OTC reading glasses                  ASSESSMENT/PLAN:     Diagnoses and Plan:     Type 2 diabetes mellitus without compl

## (undated) NOTE — LETTER
ASTHMA ACTION PLAN for Samule Cockayne     : 1950     Date: 21  Doctor:  Mp Khanna DO  Phone for doctor or clinic: Ly Ponce , [address], [facility]  [phone]      ACT Score: 2 Leukotriene Modulators Instructions     Montelukast Sodium 10 MG Oral Tab    Take 1 tablet (10 mg total) by mouth nightly.  At Bedtime    Sympathomimetics Instructions     Albuterol Sulfate HFA (PROAIR HFA) 108 (90 Base) MCG/ACT Inhalation Aero Soln    [facility]

## (undated) NOTE — LETTER
7/7/17    Master Alvarado,    Your Omeprazole prescription has been refilled.     Thanks for using Jam Petty RN

## (undated) NOTE — MR AVS SNAPSHOT
[facility] - [facility] DIV[facility]  [address], [address]  [phone]               Thank you for choosing us for your health care visit with Leola Phalen, MD.  We are glad to serve you and happy to provide you with this summary Assoc Dx:  Asthma with COPD (chronic obstructive pulmonary disease) ([facility] [L32.6, M87.450]                 Scheduling Instructions     Wednesday April 05, 2017     Imaging:  ADAMA SCREENING BILAT (HUP=[id number])    Instructions:   To schedule a test at any [facility] may be held responsible for payment in full if proper authorization is not acquired. Please contact the Patient Business Office at [phone] if you have   any questions related to insurance coverage. Thank you.        Thursday April 05, 2018     PFT: Assoc Dx:  Type 2 diabetes mellitus with hyperglycemia, without long-term current use of insulin ([facility] [E11.65]        PULMONARY - INTERNAL [[id number] CUSTOM]  Order #:  [id number]         **REFERRAL REQUEST**    Your physician has referred you to a special This list is accurate as of: 4/5/17  3:04 PM.  Always use your most recent med list.                * albuterol sulfate (2.5 MG/3ML) 0.083% Nebu   Take 3 mL by nebulization every 4 (four) hours as needed for Wheezing.  Use as directed   Commonly known as: * Notice: This list has 7 medication(s) that are the same as other medications prescribed for you. Read the directions carefully, and ask your doctor or other care provider to review them with you.          Where to Get Your Medications      These medicat Women and lighter weight persons: limit to <= 1 drink* per day.               Visit [facility] online at  [email or web address]

## (undated) NOTE — LETTER
2023      No Recipients     Patient: Antone Olszewski   YOB: 1950   Date of Visit: 2023       Dear Dr. Eva Francisco Recipients: Thank you for referring Antone Olszewski to me for evaluation. Here is my assessment and plan of care:    Antone Olszewski is a 67year old female. HPI:     HPI    Consult per Dr. Santo Carrington     Pt has been a diabetic for 7 years       Pt's diabetes is currently controlled by pills  Pt checks BS once a day   Pt's last blood sugar was 115  Last HA1C was 6.7 on 3/1/23  Endocrinologist: none    Pt complains of blurred vision in her right eye x 1 year. Pt is currently only wearing OTC reading glasses. Last edited by Ernst Osborn O.TShmuel on 2023  2:13 PM.        Patient History:  Past Medical History:   Diagnosis Date    Asthma     Chest pain     Diabetes ([address]     Diverticulosis     Esophageal reflux     Extrinsic asthma, unspecified     History of blood transfusion     Polyp of colon 2017    None in . Repeat CLN in  if patient interested in screening/remains in good health    Pulmonary embolism [facility])     Sleep apnea        Surgical History: Antone Olszewski has a past surgical history that includes cholecystectomy; hysterectomy; Cardiac catheterization; colonoscopy; colonoscopy (N/A, 2017) (Procedure: COLONOSCOPY;  Surgeon: Paula Nicole MD;  Location: [address] [place] [address] ENDOSCOPY); and colonoscopy (N/A, 2020) (Procedure: COLONOSCOPY;  Surgeon: Paula Nicole MD;  Location: [facility]).     Family History   Problem Relation Age of Onset    Diabetes Mother 79        CAUSE OF DEATH    Other (DVT) Son     Diabetes Sister     Cancer Sister 48        RENAL    Hypertension Brother     Glaucoma Neg     Macular degeneration Neg        Social History:   Social History     Socioeconomic History    Marital status:    Tobacco Use    Smoking status: Former     Years: 30.00     Types: Cigarettes     Quit date: 1980     Years since quittin.4    Smokeless tobacco: Never   Vaping Use    Vaping Use: Never used   Substance and Sexual Activity    Alcohol use: Yes     Alcohol/week: 1.0 standard drink of alcohol     Types: 1 Shots of liquor per week     Comment: \"socially\"    Drug use: No       Medications:  Current Outpatient Medications   Medication Sig Dispense Refill    montelukast 10 MG Oral Tab Take 1 tablet (10 mg total) by mouth nightly. At Bedtime 90 tablet 3    lidocaine (LIDO DEION) 4 % External Patch Place 1 patch onto the skin daily. 15 patch 2    albuterol 108 (90 Base) MCG/ACT Inhalation Aero Soln Inhale 2 puffs into the lungs every 4 (four) hours as needed for Wheezing. 1 each 3    fluticasone-salmeterol 250-50 MCG/ACT Inhalation Aerosol Powder, Breath Activated INHALE ONE PUFF BY MOUTH EVERY 12 HOURS 1 each 2    Glucose Blood (ACCU-CHEK LORRAINE PLUS) In Vitro Strip Check blood sugar once daily 100 strip 3    metFORMIN 500 MG Oral Tab Take 1 tablet (500 mg total) by mouth 2 (two) times daily with meals. 180 tablet 3    ibuprofen 600 MG Oral Tab Take 1 tablet (600 mg total) by mouth every 6 (six) hours as needed for Pain. 20 tablet 0    cyclobenzaprine 5 MG Oral Tab Take 1 tablet (5 mg total) by mouth 3 (three) times daily as needed for Muscle spasms. 20 tablet 0    meclizine 25 MG Oral Tab Take 1 tablet (25 mg total) by mouth 2 (two) times daily as needed. 60 tablet 0    omeprazole 20 MG Oral Capsule Delayed Release Take 1 capsule (20 mg total) by mouth every morning. 90 capsule 3    pravastatin 10 MG Oral Tab Take 1 tablet (10 mg total) by mouth nightly. 90 tablet 1    fluticasone propionate (FLONASE) 50 MCG/ACT Nasal Suspension 2 sprays by Nasal route daily. 3 each 1    lisinopril 10 MG Oral Tab Take 1 tablet (10 mg total) by mouth daily. 90 tablet 3    lisinopril 5 MG Oral Tab Take 1 tablet (5 mg total) by mouth daily. Taking 15mg total 90 tablet 3    Accu-Chek Softclix Lancets Does not apply Misc Check blood glucose once daily. 90 each 3    Lancets Misc. Does not apply Misc To check blood sugar once daily 100 each 3    Glucose Blood In Vitro Strip place 1 by Misc. (Non-Drug; Combo Route) route 3 times every day with glucose monitor 100 strip 3    Blood Glucose Monitoring Suppl (BLOOD GLUCOSE MONITOR SYSTEM) w/Device Does not apply Kit One Touch machine. Please check accucheck once daily 1 kit 0    Levocetirizine Dihydrochloride 5 MG Oral Tab Take 1 tablet (5 mg total) by mouth every evening. 90 tablet 1    aspirin 81 MG Oral Tab Take 1 tablet (81 mg total) by mouth daily. Cyanocobalamin (VITAMIN B-12 CR OR) Take  by mouth. Multiple Vitamins-Minerals (CENTRUM SILVER) Oral Tab Take  by mouth. Take one tablet by mouth daily         Allergies:    Penicillins             WHEEZING    ROS:     ROS    Positive for: Endocrine, Eyes  Negative for: Constitutional, Gastrointestinal, Neurological, Skin, Genitourinary, Musculoskeletal, HENT, Cardiovascular, Respiratory, Psychiatric, Allergic/Imm, Heme/Lymph  Last edited by Collins Wright OPECRY on 6/21/2023  2:10 PM.          PHYSICAL EXAM:     Base Eye Exam       Visual Acuity (Snellen - Linear)         Right Left    Dist sc 20/150 20/25    Dist ph sc 20/30     Near cc 20/40 20/20              Tonometry (Icare, 2:20 PM)         Right Left    Pressure 19 19              Pupils         Pupils    Right PERRL    Left PERRL              Visual Fields         Left Right     Full Full              Extraocular Movement         Right Left     Full, Ortho Full, Ortho              Neuro/Psych       Oriented x3:  Yes              Dilation       Both eyes: 1.0% Mydriacyl and 2.5% Jose Synephrine @ 2:20 PM                  Slit Lamp and Fundus Exam       Slit Lamp Exam         Right Left    Lids/Lashes Dermatochalasis, Meibomian gland dysfunction Dermatochalasis, Meibomian gland dysfunction    Conjunctiva/Sclera Normal Normal    Cornea Trace Guttata Trace Guttata    Anterior Chamber Deep and quiet Deep and quiet    Iris Normal Normal Lens 3+ Nuclear sclerosis, 1+ Cortical cataract 2+ Nuclear sclerosis, Trace Cortical cataract    Vitreous Vitreous floaters Vitreous floaters              Fundus Exam         Right Left    Disc Good rim, Temporal crescent Good rim, Temporal crescent    C/D Ratio 0.5 0.5    Macula Normal, no BDR Normal, no BDR     Vessels Normal Normal    Periphery Normal Normal                  Refraction       Wearing Rx         Sphere Cylinder    Right +2.00 Sphere    Left +2.00 Sphere      Type: OTC reading only              Manifest Refraction         Sphere Cylinder Dist VA Add Near VA    Right -1.50 Sphere 20/25 +2.75 20/20    Left [place] Sphere 20/25 +2.75 20/20              Final Rx         Sphere Cylinder Dist VA Add Near [place]    Right -1.50 Sphere 20/25 +2.75 20/20    Left [place] Sphere 20/25 +2.75 20/20      Type: Progressive bifocal                     ASSESSMENT/PLAN:     Diagnoses and Plan:     Type 2 diabetes mellitus without retinopathy (HCC)  Diabetes type II: no background of retinopathy, no signs of neovascularization noted. Discussed ocular and systemic benefits of blood sugar control. Diagnosis and treatment discussed in detail with patient. Will see patient in 1 year for a diabetic exam    Age-related nuclear cataract of both eyes  Discussed moderate cataracts in both eyes that are not affecting vision and are not surgical at this time. New glasses Rx given today, recommend update    Vitreous floaters of both eyes   There is no evidence of retinal pathology. All signs and symptoms of retinal detachment/tears explained in detail. Patient instructed to call the office if they experience increase in floaters, increase in flashes of light, loss of vision or curtain or veil effect. No orders of the defined types were placed in this encounter.       Meds This Visit:  Requested Prescriptions      No prescriptions requested or ordered in this encounter        Follow up instructions:  Return in about 1 year (around 6/21/2024) for Diabetic eye exam.    6/21/2023  Scribed by: Gifty Brown MD      If you have questions, please do not hesitate to call me. I look forward to following Sweetie Deras along with you.     Sincerely,        Gifty Brown MD        CC:   No Recipients    Document electronically generated by: Gifty Brown MD

## (undated) NOTE — MR AVS SNAPSHOT
[facility] DIV[facility]  [address], [address]  [phone]               Thank you for choosing us for your health care visit with Emelia Runner, MD.  We are glad to serve you and happy to provide you with this summary What changed:    - how much to take  - when to take this  - additional instructions   Commonly known as:  ADVAIR DISKUS           FOLTRATE 500-1 MCG-MG Tabs   Take  by mouth.  take 1 tablet by oral route  every day           MetFORMIN HCl 500 MG Tabs   Take EDUCATION PROGRAM INITIAL, DB ED (INTERNAL)    Complete by:  As directed    Assoc Dx:  Type 2 diabetes mellitus with hyperglycemia, without long-term current use of insulin ([facility] [E11.65]                 Referral Details     Referred By    Referred To Check types of training services needed and send recent labs for patient eligibility and outcome monitoring  Inital self-management training classes (includes monitoring and Medical Nutrition Therapy) - up to 10 hours    Assoc Dx:  Type 2 diabetes mellitus

## (undated) NOTE — LETTER
09/08/17        Oralee Killings  [address]      Dear Henry Alvarenga,    [facility] records indicate that you have outstanding lab work and or testing that was ordered for you and has not yet been completed: Magnesium bl. ood test due August 2017

## (undated) NOTE — MR AVS SNAPSHOT
[facility] DIV[facility]  [address], [address]  [phone]               Thank you for choosing us for your health care visit with Deborah Bence, MD.  We are glad to serve you and happy to provide you with this summary C. Continue ALL other medications as usual    4. Read all bowel prep instructions carefully    5. AVOID seeds, nuts, popcorn, raw fruits and vegetables (cooked is okay) for 2-3 days before procedure    6.  Blood tests       Allergies as of Ric 15, 2017 Take 1 capsule (20 mg total) by mouth every morning before breakfast.   Commonly known as:  PRILOSEC           * ONETOUCH ULTRA BLUE Strp   Generic drug:  Glucose Blood   place 1 by Misc. (Non-Drug; Combo Route) route 3 times every day with glucose monitor Visit [facility] online at  [email or web address]

## (undated) NOTE — MR AVS SNAPSHOT
[facility] DIV[facility]  [address], [address]  [phone]               Thank you for choosing us for your health care visit with Valdene Bence, MD.  We are glad to serve you and happy to provide you with this summary Commonly known as:  FLONASE           fluticasone-salmeterol 250-50 MCG/DOSE Aepb   Inhale 1 puff into the lungs every 12 (twelve) hours. Commonly known as:  ADVAIR DISKUS           FOLTRATE 500-1 MCG-MG Tabs   Take  by mouth.  take 1 tablet by oral route - Albuterol Sulfate  (90 Base) MCG/ACT Aers  - fluticasone-salmeterol 250-50 MCG/DOSE Aepb  - MetFORMIN HCl 500 MG Tabs  - predniSONE 20 MG Tabs            Medications Administered in the Office Today     albuterol sulfate (VENTOLIN) (2.5 MG/3ML) 0. Tips for making healthy food choices  -   Enjoy your food, but eat less. Fully enjoy your food when eating. Don’t eat while distracted and slow down. Avoid over sized portions. Don’t eat while when you’re bored.      EAT THESE FOODS MORE OFTEN: EAT T

## (undated) NOTE — LETTER
1/12/2022              Silvana Gallardo         To whom it may concern,        Christian Frankel is a high risk for complications of COVID. She should not be exposed to high risk, unvaccinated individuals.      Since

## (undated) NOTE — LETTER
24      Patient: Silvana Morales  : 1950 Visit date: 2024    Dear Rekha,      I examined your patient in consultation today.    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    We will plan on excision of this supernumerary digits.    Thank you for your kind referral. If I may answer any questions, please feel free to contact me.     Sincerely,   Dexter Tobin MD     CC: No Recipients

## (undated) NOTE — LETTER
September 1, 2021    [facility]     Patient: Yoan Allen   YOB: 1950   Date of Visit: 9/1/2021       Dear Dr. 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% Inhalation Nebu Soln Take 3 mL (2.5 mg total) by nebulization every 4 (four) hours as needed for Wheezing.  Use as directed 1 Box 3   • Blood Glucose Monitoring Suppl (ONETOUCH ULTRA SYSTEM) W/DEVICE Does not apply Kit To use daily 1 kit 0   • Cyanoc rim, Temporal crescent Good rim, Temporal crescent    C/D Ratio 0.5 0.5    Macula Normal, no BDR Normal, no BDR     Vessels Normal Normal    Periphery Normal Normal            Refraction     Wearing Rx       Sphere Cylinder    Right +2.00 Sphere    Left +2